# Patient Record
Sex: MALE | Race: BLACK OR AFRICAN AMERICAN | NOT HISPANIC OR LATINO | Employment: FULL TIME | URBAN - METROPOLITAN AREA
[De-identification: names, ages, dates, MRNs, and addresses within clinical notes are randomized per-mention and may not be internally consistent; named-entity substitution may affect disease eponyms.]

---

## 2018-09-22 ENCOUNTER — HOSPITAL ENCOUNTER (INPATIENT)
Facility: MEDICAL CENTER | Age: 28
LOS: 5 days | DRG: 871 | End: 2018-09-27
Attending: EMERGENCY MEDICINE | Admitting: HOSPITALIST
Payer: COMMERCIAL

## 2018-09-22 ENCOUNTER — APPOINTMENT (OUTPATIENT)
Dept: RADIOLOGY | Facility: MEDICAL CENTER | Age: 28
DRG: 871 | End: 2018-09-22
Attending: EMERGENCY MEDICINE
Payer: COMMERCIAL

## 2018-09-22 DIAGNOSIS — J18.9 PNEUMONIA OF BOTH LUNGS DUE TO INFECTIOUS ORGANISM, UNSPECIFIED PART OF LUNG: ICD-10-CM

## 2018-09-22 DIAGNOSIS — J98.2 PNEUMOMEDIASTINUM (HCC): ICD-10-CM

## 2018-09-22 DIAGNOSIS — R11.2 NON-INTRACTABLE VOMITING WITH NAUSEA, UNSPECIFIED VOMITING TYPE: ICD-10-CM

## 2018-09-22 PROBLEM — A41.9 SEPSIS (HCC): Status: ACTIVE | Noted: 2018-09-22

## 2018-09-22 PROBLEM — R00.0 TACHYCARDIA: Status: ACTIVE | Noted: 2018-09-22

## 2018-09-22 PROBLEM — R10.9 ABDOMINAL PAIN: Status: ACTIVE | Noted: 2018-09-22

## 2018-09-22 LAB
ALBUMIN SERPL BCP-MCNC: 2.8 G/DL (ref 3.2–4.9)
ALBUMIN SERPL BCP-MCNC: 3.7 G/DL (ref 3.2–4.9)
ALBUMIN/GLOB SERPL: 1 G/DL
ALBUMIN/GLOB SERPL: 1.1 G/DL
ALP SERPL-CCNC: 55 U/L (ref 30–99)
ALP SERPL-CCNC: 74 U/L (ref 30–99)
ALT SERPL-CCNC: 11 U/L (ref 2–50)
ALT SERPL-CCNC: 12 U/L (ref 2–50)
ANION GAP SERPL CALC-SCNC: 8 MMOL/L (ref 0–11.9)
ANION GAP SERPL CALC-SCNC: 9 MMOL/L (ref 0–11.9)
ANISOCYTOSIS BLD QL SMEAR: ABNORMAL
APPEARANCE UR: CLEAR
AST SERPL-CCNC: 22 U/L (ref 12–45)
AST SERPL-CCNC: 26 U/L (ref 12–45)
BACTERIA #/AREA URNS HPF: NEGATIVE /HPF
BASE EXCESS BLDA CALC-SCNC: 2 MMOL/L (ref -4–3)
BASOPHILS # BLD AUTO: 0 % (ref 0–1.8)
BASOPHILS # BLD: 0 K/UL (ref 0–0.12)
BILIRUB SERPL-MCNC: 1 MG/DL (ref 0.1–1.5)
BILIRUB SERPL-MCNC: 1.3 MG/DL (ref 0.1–1.5)
BILIRUB UR QL STRIP.AUTO: NEGATIVE
BODY TEMPERATURE: ABNORMAL CENTIGRADE
BUN SERPL-MCNC: 13 MG/DL (ref 8–22)
BUN SERPL-MCNC: 15 MG/DL (ref 8–22)
CALCIUM SERPL-MCNC: 8.1 MG/DL (ref 8.5–10.5)
CALCIUM SERPL-MCNC: 9.3 MG/DL (ref 8.5–10.5)
CHLORIDE SERPL-SCNC: 103 MMOL/L (ref 96–112)
CHLORIDE SERPL-SCNC: 96 MMOL/L (ref 96–112)
CO2 SERPL-SCNC: 25 MMOL/L (ref 20–33)
CO2 SERPL-SCNC: 28 MMOL/L (ref 20–33)
COLOR UR: YELLOW
CREAT SERPL-MCNC: 0.95 MG/DL (ref 0.5–1.4)
CREAT SERPL-MCNC: 1.06 MG/DL (ref 0.5–1.4)
DOHLE BOD BLD QL SMEAR: NORMAL
EOSINOPHIL # BLD AUTO: 0 K/UL (ref 0–0.51)
EOSINOPHIL NFR BLD: 0 % (ref 0–6.9)
EPI CELLS #/AREA URNS HPF: NEGATIVE /HPF
ERYTHROCYTE [DISTWIDTH] IN BLOOD BY AUTOMATED COUNT: 35.1 FL (ref 35.9–50)
FLUAV RNA SPEC QL NAA+PROBE: NEGATIVE
FLUBV RNA SPEC QL NAA+PROBE: NEGATIVE
GLOBULIN SER CALC-MCNC: 2.8 G/DL (ref 1.9–3.5)
GLOBULIN SER CALC-MCNC: 3.5 G/DL (ref 1.9–3.5)
GLUCOSE SERPL-MCNC: 100 MG/DL (ref 65–99)
GLUCOSE SERPL-MCNC: 131 MG/DL (ref 65–99)
GLUCOSE UR STRIP.AUTO-MCNC: NEGATIVE MG/DL
HCO3 BLDA-SCNC: 26 MMOL/L (ref 17–25)
HCT VFR BLD AUTO: 47.3 % (ref 42–52)
HGB BLD-MCNC: 17.2 G/DL (ref 14–18)
HYALINE CASTS #/AREA URNS LPF: ABNORMAL /LPF
KETONES UR STRIP.AUTO-MCNC: NEGATIVE MG/DL
LACTATE BLD-SCNC: 2.1 MMOL/L (ref 0.5–2)
LEUKOCYTE ESTERASE UR QL STRIP.AUTO: NEGATIVE
LIPASE SERPL-CCNC: 11 U/L (ref 11–82)
LYMPHOCYTES # BLD AUTO: 1.14 K/UL (ref 1–4.8)
LYMPHOCYTES NFR BLD: 12.4 % (ref 22–41)
MAGNESIUM SERPL-MCNC: 2.2 MG/DL (ref 1.5–2.5)
MANUAL DIFF BLD: NORMAL
MCH RBC QN AUTO: 30.5 PG (ref 27–33)
MCHC RBC AUTO-ENTMCNC: 36.4 G/DL (ref 33.7–35.3)
MCV RBC AUTO: 83.9 FL (ref 81.4–97.8)
MICRO URNS: ABNORMAL
MONOCYTES # BLD AUTO: 1.79 K/UL (ref 0–0.85)
MONOCYTES NFR BLD AUTO: 19.5 % (ref 0–13.4)
MORPHOLOGY BLD-IMP: NORMAL
NEUTROPHILS # BLD AUTO: 6.27 K/UL (ref 1.82–7.42)
NEUTROPHILS NFR BLD: 68.1 % (ref 44–72)
NITRITE UR QL STRIP.AUTO: NEGATIVE
NRBC # BLD AUTO: 0 K/UL
NRBC BLD-RTO: 0 /100 WBC
OVALOCYTES BLD QL SMEAR: NORMAL
PCO2 BLDA: 38.3 MMHG (ref 26–37)
PH BLDA: 7.45 [PH] (ref 7.4–7.5)
PH UR STRIP.AUTO: 6 [PH]
PLATELET # BLD AUTO: 199 K/UL (ref 164–446)
PLATELET BLD QL SMEAR: NORMAL
PMV BLD AUTO: 11.2 FL (ref 9–12.9)
PO2 BLDA: 46.6 MMHG (ref 64–87)
POTASSIUM SERPL-SCNC: 3.5 MMOL/L (ref 3.6–5.5)
POTASSIUM SERPL-SCNC: 4.1 MMOL/L (ref 3.6–5.5)
PROCALCITONIN SERPL-MCNC: 31.6 NG/ML
PROT SERPL-MCNC: 5.6 G/DL (ref 6–8.2)
PROT SERPL-MCNC: 7.2 G/DL (ref 6–8.2)
PROT UR QL STRIP: 30 MG/DL
RBC # BLD AUTO: 5.64 M/UL (ref 4.7–6.1)
RBC # URNS HPF: ABNORMAL /HPF
RBC BLD AUTO: PRESENT
RBC UR QL AUTO: ABNORMAL
SAO2 % BLDA: 82.8 % (ref 93–99)
SODIUM SERPL-SCNC: 133 MMOL/L (ref 135–145)
SODIUM SERPL-SCNC: 136 MMOL/L (ref 135–145)
SP GR UR STRIP.AUTO: 1.01
TSH SERPL DL<=0.005 MIU/L-ACNC: 0.34 UIU/ML (ref 0.38–5.33)
UROBILINOGEN UR STRIP.AUTO-MCNC: 1 MG/DL
WBC # BLD AUTO: 9.2 K/UL (ref 4.8–10.8)
WBC #/AREA URNS HPF: ABNORMAL /HPF

## 2018-09-22 PROCEDURE — 96361 HYDRATE IV INFUSION ADD-ON: CPT

## 2018-09-22 PROCEDURE — 87181 SC STD AGAR DILUTION PER AGT: CPT

## 2018-09-22 PROCEDURE — 87077 CULTURE AEROBIC IDENTIFY: CPT | Mod: 91

## 2018-09-22 PROCEDURE — 96365 THER/PROPH/DIAG IV INF INIT: CPT

## 2018-09-22 PROCEDURE — 87633 RESP VIRUS 12-25 TARGETS: CPT

## 2018-09-22 PROCEDURE — 74210 X-RAY XM PHRNX&/CRV ESOPH C+: CPT

## 2018-09-22 PROCEDURE — 700111 HCHG RX REV CODE 636 W/ 250 OVERRIDE (IP): Performed by: HOSPITALIST

## 2018-09-22 PROCEDURE — 770022 HCHG ROOM/CARE - ICU (200)

## 2018-09-22 PROCEDURE — 99291 CRITICAL CARE FIRST HOUR: CPT

## 2018-09-22 PROCEDURE — 82803 BLOOD GASES ANY COMBINATION: CPT

## 2018-09-22 PROCEDURE — 700102 HCHG RX REV CODE 250 W/ 637 OVERRIDE(OP): Performed by: HOSPITALIST

## 2018-09-22 PROCEDURE — 87502 INFLUENZA DNA AMP PROBE: CPT

## 2018-09-22 PROCEDURE — 700105 HCHG RX REV CODE 258: Performed by: EMERGENCY MEDICINE

## 2018-09-22 PROCEDURE — 700111 HCHG RX REV CODE 636 W/ 250 OVERRIDE (IP): Performed by: EMERGENCY MEDICINE

## 2018-09-22 PROCEDURE — 85007 BL SMEAR W/DIFF WBC COUNT: CPT

## 2018-09-22 PROCEDURE — 87486 CHLMYD PNEUM DNA AMP PROBE: CPT

## 2018-09-22 PROCEDURE — 700105 HCHG RX REV CODE 258: Performed by: HOSPITALIST

## 2018-09-22 PROCEDURE — 81001 URINALYSIS AUTO W/SCOPE: CPT

## 2018-09-22 PROCEDURE — 93005 ELECTROCARDIOGRAM TRACING: CPT | Performed by: HOSPITALIST

## 2018-09-22 PROCEDURE — 94640 AIRWAY INHALATION TREATMENT: CPT

## 2018-09-22 PROCEDURE — 94760 N-INVAS EAR/PLS OXIMETRY 1: CPT

## 2018-09-22 PROCEDURE — 80053 COMPREHEN METABOLIC PANEL: CPT | Mod: 91

## 2018-09-22 PROCEDURE — 85027 COMPLETE CBC AUTOMATED: CPT

## 2018-09-22 PROCEDURE — 71260 CT THORAX DX C+: CPT

## 2018-09-22 PROCEDURE — 700101 HCHG RX REV CODE 250: Performed by: INTERNAL MEDICINE

## 2018-09-22 PROCEDURE — 87040 BLOOD CULTURE FOR BACTERIA: CPT | Mod: 91

## 2018-09-22 PROCEDURE — A9270 NON-COVERED ITEM OR SERVICE: HCPCS | Performed by: HOSPITALIST

## 2018-09-22 PROCEDURE — 99233 SBSQ HOSP IP/OBS HIGH 50: CPT | Performed by: INTERNAL MEDICINE

## 2018-09-22 PROCEDURE — 700117 HCHG RX CONTRAST REV CODE 255: Performed by: EMERGENCY MEDICINE

## 2018-09-22 PROCEDURE — 83605 ASSAY OF LACTIC ACID: CPT

## 2018-09-22 PROCEDURE — 83690 ASSAY OF LIPASE: CPT

## 2018-09-22 PROCEDURE — 87581 M.PNEUMON DNA AMP PROBE: CPT

## 2018-09-22 PROCEDURE — 96376 TX/PRO/DX INJ SAME DRUG ADON: CPT

## 2018-09-22 PROCEDURE — 36415 COLL VENOUS BLD VENIPUNCTURE: CPT

## 2018-09-22 PROCEDURE — 84145 PROCALCITONIN (PCT): CPT

## 2018-09-22 PROCEDURE — 700101 HCHG RX REV CODE 250: Performed by: HOSPITALIST

## 2018-09-22 PROCEDURE — 99291 CRITICAL CARE FIRST HOUR: CPT | Performed by: INTERNAL MEDICINE

## 2018-09-22 PROCEDURE — 71045 X-RAY EXAM CHEST 1 VIEW: CPT

## 2018-09-22 PROCEDURE — 83735 ASSAY OF MAGNESIUM: CPT

## 2018-09-22 PROCEDURE — 96375 TX/PRO/DX INJ NEW DRUG ADDON: CPT

## 2018-09-22 PROCEDURE — 93010 ELECTROCARDIOGRAM REPORT: CPT | Performed by: INTERNAL MEDICINE

## 2018-09-22 PROCEDURE — 700111 HCHG RX REV CODE 636 W/ 250 OVERRIDE (IP): Performed by: INTERNAL MEDICINE

## 2018-09-22 PROCEDURE — 84443 ASSAY THYROID STIM HORMONE: CPT

## 2018-09-22 PROCEDURE — 99221 1ST HOSP IP/OBS SF/LOW 40: CPT | Performed by: HOSPITALIST

## 2018-09-22 RX ORDER — SODIUM CHLORIDE 9 MG/ML
1000 INJECTION, SOLUTION INTRAVENOUS ONCE
Status: COMPLETED | OUTPATIENT
Start: 2018-09-22 | End: 2018-09-23

## 2018-09-22 RX ORDER — FAMOTIDINE 20 MG/1
20 TABLET, FILM COATED ORAL 2 TIMES DAILY
Status: DISCONTINUED | OUTPATIENT
Start: 2018-09-22 | End: 2018-09-27 | Stop reason: HOSPADM

## 2018-09-22 RX ORDER — BISACODYL 10 MG
10 SUPPOSITORY, RECTAL RECTAL
Status: DISCONTINUED | OUTPATIENT
Start: 2018-09-22 | End: 2018-09-27 | Stop reason: HOSPADM

## 2018-09-22 RX ORDER — MORPHINE SULFATE 4 MG/ML
4 INJECTION, SOLUTION INTRAMUSCULAR; INTRAVENOUS ONCE
Status: COMPLETED | OUTPATIENT
Start: 2018-09-22 | End: 2018-09-22

## 2018-09-22 RX ORDER — SODIUM CHLORIDE 9 MG/ML
1000 INJECTION, SOLUTION INTRAVENOUS ONCE
Status: COMPLETED | OUTPATIENT
Start: 2018-09-22 | End: 2018-09-22

## 2018-09-22 RX ORDER — POLYETHYLENE GLYCOL 3350 17 G/17G
1 POWDER, FOR SOLUTION ORAL
Status: DISCONTINUED | OUTPATIENT
Start: 2018-09-22 | End: 2018-09-27 | Stop reason: HOSPADM

## 2018-09-22 RX ORDER — ONDANSETRON 2 MG/ML
4 INJECTION INTRAMUSCULAR; INTRAVENOUS ONCE
Status: COMPLETED | OUTPATIENT
Start: 2018-09-22 | End: 2018-09-22

## 2018-09-22 RX ORDER — ONDANSETRON 2 MG/ML
4 INJECTION INTRAMUSCULAR; INTRAVENOUS EVERY 4 HOURS PRN
Status: DISCONTINUED | OUTPATIENT
Start: 2018-09-22 | End: 2018-09-27 | Stop reason: HOSPADM

## 2018-09-22 RX ORDER — OXYCODONE HYDROCHLORIDE 5 MG/1
2.5 TABLET ORAL
Status: DISCONTINUED | OUTPATIENT
Start: 2018-09-22 | End: 2018-09-26

## 2018-09-22 RX ORDER — PROMETHAZINE HYDROCHLORIDE 25 MG/1
12.5-25 TABLET ORAL EVERY 4 HOURS PRN
Status: DISCONTINUED | OUTPATIENT
Start: 2018-09-22 | End: 2018-09-27 | Stop reason: HOSPADM

## 2018-09-22 RX ORDER — AZITHROMYCIN 500 MG/1
500 INJECTION, POWDER, LYOPHILIZED, FOR SOLUTION INTRAVENOUS ONCE
Status: COMPLETED | OUTPATIENT
Start: 2018-09-22 | End: 2018-09-22

## 2018-09-22 RX ORDER — SODIUM CHLORIDE AND POTASSIUM CHLORIDE 150; 900 MG/100ML; MG/100ML
INJECTION, SOLUTION INTRAVENOUS CONTINUOUS
Status: DISCONTINUED | OUTPATIENT
Start: 2018-09-22 | End: 2018-09-24

## 2018-09-22 RX ORDER — HYDROMORPHONE HYDROCHLORIDE 2 MG/ML
0.5 INJECTION, SOLUTION INTRAMUSCULAR; INTRAVENOUS; SUBCUTANEOUS ONCE
Status: COMPLETED | OUTPATIENT
Start: 2018-09-22 | End: 2018-09-22

## 2018-09-22 RX ORDER — AMOXICILLIN 250 MG
2 CAPSULE ORAL 2 TIMES DAILY
Status: DISCONTINUED | OUTPATIENT
Start: 2018-09-23 | End: 2018-09-27 | Stop reason: HOSPADM

## 2018-09-22 RX ORDER — METHYLPREDNISOLONE SODIUM SUCCINATE 125 MG/2ML
62.5 INJECTION, POWDER, LYOPHILIZED, FOR SOLUTION INTRAMUSCULAR; INTRAVENOUS EVERY 6 HOURS
Status: DISCONTINUED | OUTPATIENT
Start: 2018-09-22 | End: 2018-09-23

## 2018-09-22 RX ORDER — ONDANSETRON 4 MG/1
4 TABLET, ORALLY DISINTEGRATING ORAL EVERY 4 HOURS PRN
Status: DISCONTINUED | OUTPATIENT
Start: 2018-09-22 | End: 2018-09-27 | Stop reason: HOSPADM

## 2018-09-22 RX ORDER — IPRATROPIUM BROMIDE AND ALBUTEROL SULFATE 2.5; .5 MG/3ML; MG/3ML
3 SOLUTION RESPIRATORY (INHALATION)
Status: DISCONTINUED | OUTPATIENT
Start: 2018-09-22 | End: 2018-09-23 | Stop reason: ALTCHOICE

## 2018-09-22 RX ORDER — OXYCODONE HYDROCHLORIDE 5 MG/1
5 TABLET ORAL
Status: DISCONTINUED | OUTPATIENT
Start: 2018-09-22 | End: 2018-09-26

## 2018-09-22 RX ORDER — MORPHINE SULFATE 4 MG/ML
2 INJECTION, SOLUTION INTRAMUSCULAR; INTRAVENOUS
Status: DISCONTINUED | OUTPATIENT
Start: 2018-09-22 | End: 2018-09-26

## 2018-09-22 RX ORDER — ACETAMINOPHEN 325 MG/1
650 TABLET ORAL EVERY 6 HOURS PRN
Status: DISCONTINUED | OUTPATIENT
Start: 2018-09-22 | End: 2018-09-27 | Stop reason: HOSPADM

## 2018-09-22 RX ORDER — HEPARIN SODIUM 5000 [USP'U]/ML
5000 INJECTION, SOLUTION INTRAVENOUS; SUBCUTANEOUS EVERY 8 HOURS
Status: DISCONTINUED | OUTPATIENT
Start: 2018-09-22 | End: 2018-09-26

## 2018-09-22 RX ORDER — PROMETHAZINE HYDROCHLORIDE 25 MG/1
12.5-25 SUPPOSITORY RECTAL EVERY 4 HOURS PRN
Status: DISCONTINUED | OUTPATIENT
Start: 2018-09-22 | End: 2018-09-27 | Stop reason: HOSPADM

## 2018-09-22 RX ADMIN — AZITHROMYCIN MONOHYDRATE 500 MG: 500 INJECTION, POWDER, LYOPHILIZED, FOR SOLUTION INTRAVENOUS at 15:06

## 2018-09-22 RX ADMIN — ONDANSETRON 4 MG: 2 INJECTION INTRAMUSCULAR; INTRAVENOUS at 12:17

## 2018-09-22 RX ADMIN — IOHEXOL 75 ML: 350 INJECTION, SOLUTION INTRAVENOUS at 14:44

## 2018-09-22 RX ADMIN — IPRATROPIUM BROMIDE AND ALBUTEROL SULFATE 3 ML: .5; 3 SOLUTION RESPIRATORY (INHALATION) at 21:06

## 2018-09-22 RX ADMIN — OXYCODONE HYDROCHLORIDE 5 MG: 5 TABLET ORAL at 20:51

## 2018-09-22 RX ADMIN — IOHEXOL 75 ML: 350 INJECTION, SOLUTION INTRAVENOUS at 12:30

## 2018-09-22 RX ADMIN — MORPHINE SULFATE 4 MG: 4 INJECTION INTRAVENOUS at 09:09

## 2018-09-22 RX ADMIN — SODIUM CHLORIDE 1000 ML: 9 INJECTION, SOLUTION INTRAVENOUS at 09:08

## 2018-09-22 RX ADMIN — SODIUM CHLORIDE 1000 ML: 9 INJECTION, SOLUTION INTRAVENOUS at 10:35

## 2018-09-22 RX ADMIN — ONDANSETRON 4 MG: 2 INJECTION INTRAMUSCULAR; INTRAVENOUS at 09:08

## 2018-09-22 RX ADMIN — POTASSIUM CHLORIDE AND SODIUM CHLORIDE: 900; 150 INJECTION, SOLUTION INTRAVENOUS at 18:33

## 2018-09-22 RX ADMIN — HEPARIN SODIUM 5000 UNITS: 5000 INJECTION, SOLUTION INTRAVENOUS; SUBCUTANEOUS at 21:52

## 2018-09-22 RX ADMIN — AMPICILLIN AND SULBACTAM 3 G: 2; 1 INJECTION, POWDER, FOR SOLUTION INTRAVENOUS at 20:51

## 2018-09-22 RX ADMIN — HYDROMORPHONE HYDROCHLORIDE 0.5 MG: 2 INJECTION, SOLUTION INTRAMUSCULAR; INTRAVENOUS; SUBCUTANEOUS at 12:20

## 2018-09-22 RX ADMIN — AMPICILLIN AND SULBACTAM 3 G: 2; 1 INJECTION, POWDER, FOR SOLUTION INTRAVENOUS at 14:27

## 2018-09-22 RX ADMIN — VANCOMYCIN HYDROCHLORIDE 1500 MG: 100 INJECTION, POWDER, LYOPHILIZED, FOR SOLUTION INTRAVENOUS at 18:37

## 2018-09-22 RX ADMIN — FAMOTIDINE 20 MG: 10 INJECTION, SOLUTION INTRAVENOUS at 18:00

## 2018-09-22 ASSESSMENT — LIFESTYLE VARIABLES: EVER_SMOKED: YES

## 2018-09-22 ASSESSMENT — COPD QUESTIONNAIRES
COPD SCREENING SCORE: 2
DO YOU EVER COUGH UP ANY MUCUS OR PHLEGM?: NO/ONLY WITH OCCASIONAL COLDS OR INFECTIONS
DURING THE PAST 4 WEEKS HOW MUCH DID YOU FEEL SHORT OF BREATH: NONE/LITTLE OF THE TIME
HAVE YOU SMOKED AT LEAST 100 CIGARETTES IN YOUR ENTIRE LIFE: YES

## 2018-09-22 ASSESSMENT — PAIN SCALES - GENERAL
PAINLEVEL_OUTOF10: 3
PAINLEVEL_OUTOF10: 3
PAINLEVEL_OUTOF10: 4
PAINLEVEL_OUTOF10: 8
PAINLEVEL_OUTOF10: 8
PAINLEVEL_OUTOF10: 5
PAINLEVEL_OUTOF10: 4

## 2018-09-22 ASSESSMENT — PAIN DESCRIPTION - DESCRIPTORS: DESCRIPTORS: ACHING

## 2018-09-22 NOTE — ED TRIAGE NOTES
Pt ambulates to triage with family  Chief Complaint   Patient presents with   • N/V   • Abdominal Pain   • Body Aches   Pt asked to wait in lobby, pt updated on triage process and pt asked to inform RN of any changes.

## 2018-09-22 NOTE — ED PROVIDER NOTES
"ED Provider Note    CHIEF COMPLAINT  Chief Complaint   Patient presents with   • N/V   • Abdominal Pain   • Body Aches       HPI  Gabriel Lemos is a 27 y.o. male who presents for evaluation of vomiting and body aches.  The patient states he has been sick for the past 6 days.  He has not had any fevers.  He has generalized crampy abdominal pain and generalized body aches.  He has had no diarrhea.  He states he has been urinating about every 30 minutes.    REVIEW OF SYSTEMS  See HPI for further details. All other systems negative.    PAST MEDICAL HISTORY  History reviewed. No pertinent past medical history.    FAMILY HISTORY  History reviewed. No pertinent family history.    SOCIAL HISTORY  Social History     Social History   • Marital status:      Spouse name: N/A   • Number of children: N/A   • Years of education: N/A     Social History Main Topics   • Smoking status: Current Every Day Smoker     Packs/day: 0.50     Years: 3.00     Types: Cigarettes   • Smokeless tobacco: Never Used   • Alcohol use No   • Drug use: No   • Sexual activity: Not on file     Other Topics Concern   • Not on file     Social History Narrative   • No narrative on file       SURGICAL HISTORY  History reviewed. No pertinent surgical history.    CURRENT MEDICATIONS  Home Medications     Reviewed by Mervat Ferguson R.N. (Registered Nurse) on 09/22/18 at 0827  Med List Status: Complete   Medication Last Dose Status        Patient Vasu Taking any Medications                       ALLERGIES  No Known Allergies    PHYSICAL EXAM  VITAL SIGNS: /68   Pulse (!) 132   Temp 36.8 °C (98.2 °F) (Temporal)   Resp 14   Ht 1.651 m (5' 5\")   Wt 60 kg (132 lb 4.4 oz)   SpO2 95%   BMI 22.01 kg/m²   Constitutional: Well developed, Well nourished, ill appearing.  HENT: Normocephalic, Atraumatic, dry mucous membranes.    Eyes:  EOMI, Conjunctiva normal, No discharge.   Cardiovascular: Tachycardic, Normal rhythm, No murmurs, No rubs, No gallops. "   Thorax & Lungs: Clear to auscultation without wheezes, rales, or rhonchi.    Abdomen: Soft, nontender.  Skin: Warm, Dry.  Musculoskeletal: Good range of motion in all major joints.    Neurologic: Awake and alert.    RADIOLOGY/PROCEDURES  DX-ESOPH. FLUORO (BARIUM SWALLOW)   Final Result      Normal caliber esophagus without evidence for esophageal perforation.      DX-CHEST-PORTABLE (1 VIEW)   Final Result      1.  Pneumomediastinum and possible tiny RIGHT apical pneumothorax.   2.  Extensive LEFT mid and lower lung consolidation, likely pneumonia.   3.  Possible minimal RIGHT midlung infiltrate.   4.  This constellation of findings is concerning for esophageal perforation.      These findings were discussed with SOFIA CASEY on 9/22/2018 11:03 AM.         CT-CHEST (THORAX) WITH    (Results Pending)         COURSE & MEDICAL DECISION MAKING  Pertinent Labs & Imaging studies reviewed. (See chart for details)  This is a 27-year-old here for evaluation of vomiting.  He has had a 6 day history of intractable vomiting.  He has had no fevers.  States is also had a bit of a cough.  He is extremely tachycardic on arrival and IV is established and is hydrated with 1 L of normal saline as well as treated with morphine and Zofran.  Laboratories include urinalysis which is unremarkable.  Lactate is 2.1.  CBC shows a normal white count and differential 68 polys and 12 lymphocytes.  Chemistries are notable only for a potassium of 3.5 and a glucose of 131.  Interestingly I was concerned that his symptoms may been related to diabetes however I think that unlikely based on his laboratory workup.  Liver function studies and lipase are normal.  Upon repeat evaluation the patient states he is feeling a bit better but he still has a tachycardia therefore he is given another liter of normal saline.  At that point a chest x-ray is obtained due to his cough.  I received a phone call from the radiologist.  Patient has what appears to be  pneumomediastinum and extensive left mid and lower lung consolidation likely pneumonia.  He also may have some right midlung infiltrate.  The study is concerning for esophageal perforation.  In speaking with the radiologist and esophageal contrasted swallowing study is obtained.  This shows no evidence of esophageal perforation.  At this time blood cultures have been obtained.  I discussed the case with our pharmacist and the patient will be started on community-acquired pneumonia antibiotics.  I have discussed the case with Dr. Desiree Frost of the hospitalist service.  I have consulted GI and they are coming down to evaluate the patient.    FINAL IMPRESSION  1.  Intractable nausea and vomiting  2.  Community-acquired pneumonia  3.  Pneumomediastinum  4.  Patient required 35 minutes of critical care time         Electronically signed by: Ceasar Bellamy, 9/22/2018 9:02 AM

## 2018-09-23 ENCOUNTER — APPOINTMENT (OUTPATIENT)
Dept: RADIOLOGY | Facility: MEDICAL CENTER | Age: 28
DRG: 871 | End: 2018-09-23
Attending: INTERNAL MEDICINE
Payer: COMMERCIAL

## 2018-09-23 PROBLEM — R10.9 ABDOMINAL PAIN: Status: RESOLVED | Noted: 2018-09-22 | Resolved: 2018-09-23

## 2018-09-23 PROBLEM — J96.01 ACUTE RESPIRATORY FAILURE WITH HYPOXIA (HCC): Status: ACTIVE | Noted: 2018-09-23

## 2018-09-23 PROBLEM — K22.3 ESOPHAGEAL RUPTURE: Status: ACTIVE | Noted: 2018-09-23

## 2018-09-23 PROBLEM — Z72.0 TOBACCO ABUSE: Status: ACTIVE | Noted: 2018-09-23

## 2018-09-23 LAB
ALBUMIN SERPL BCP-MCNC: 2.9 G/DL (ref 3.2–4.9)
ALBUMIN/GLOB SERPL: 1 G/DL
ALP SERPL-CCNC: 56 U/L (ref 30–99)
ALT SERPL-CCNC: 10 U/L (ref 2–50)
ANION GAP SERPL CALC-SCNC: 8 MMOL/L (ref 0–11.9)
ANISOCYTOSIS BLD QL SMEAR: ABNORMAL
AST SERPL-CCNC: 20 U/L (ref 12–45)
BASOPHILS # BLD AUTO: 0 % (ref 0–1.8)
BASOPHILS # BLD: 0 K/UL (ref 0–0.12)
BILIRUB SERPL-MCNC: 1 MG/DL (ref 0.1–1.5)
BUN SERPL-MCNC: 13 MG/DL (ref 8–22)
C PNEUM DNA SPEC QL NAA+PROBE: NOT DETECTED
CALCIUM SERPL-MCNC: 8.6 MG/DL (ref 8.5–10.5)
CHLORIDE SERPL-SCNC: 106 MMOL/L (ref 96–112)
CO2 SERPL-SCNC: 25 MMOL/L (ref 20–33)
CREAT SERPL-MCNC: 0.9 MG/DL (ref 0.5–1.4)
EKG IMPRESSION: NORMAL
EOSINOPHIL # BLD AUTO: 0 K/UL (ref 0–0.51)
EOSINOPHIL NFR BLD: 0 % (ref 0–6.9)
ERYTHROCYTE [DISTWIDTH] IN BLOOD BY AUTOMATED COUNT: 36.4 FL (ref 35.9–50)
FLUAV H1 2009 PAND RNA SPEC QL NAA+PROBE: NOT DETECTED
FLUAV H1 RNA SPEC QL NAA+PROBE: NOT DETECTED
FLUAV H3 RNA SPEC QL NAA+PROBE: NOT DETECTED
FLUAV RNA SPEC QL NAA+PROBE: NOT DETECTED
FLUBV RNA SPEC QL NAA+PROBE: NOT DETECTED
GLOBULIN SER CALC-MCNC: 2.8 G/DL (ref 1.9–3.5)
GLUCOSE SERPL-MCNC: 108 MG/DL (ref 65–99)
GRAM STN SPEC: NORMAL
HADV DNA SPEC QL NAA+PROBE: NOT DETECTED
HCOV RNA SPEC QL NAA+PROBE: NOT DETECTED
HCT VFR BLD AUTO: 37.6 % (ref 42–52)
HGB BLD-MCNC: 13.9 G/DL (ref 14–18)
HMPV RNA SPEC QL NAA+PROBE: NOT DETECTED
HPIV1 RNA SPEC QL NAA+PROBE: NOT DETECTED
HPIV2 RNA SPEC QL NAA+PROBE: NOT DETECTED
HPIV3 RNA SPEC QL NAA+PROBE: NOT DETECTED
HPIV4 RNA SPEC QL NAA+PROBE: NOT DETECTED
LYMPHOCYTES # BLD AUTO: 1.53 K/UL (ref 1–4.8)
LYMPHOCYTES NFR BLD: 14.6 % (ref 22–41)
M PNEUMO DNA SPEC QL NAA+PROBE: NOT DETECTED
MACROCYTES BLD QL SMEAR: ABNORMAL
MAGNESIUM SERPL-MCNC: 2.6 MG/DL (ref 1.5–2.5)
MANUAL DIFF BLD: NORMAL
MCH RBC QN AUTO: 31 PG (ref 27–33)
MCHC RBC AUTO-ENTMCNC: 37 G/DL (ref 33.7–35.3)
MCV RBC AUTO: 83.7 FL (ref 81.4–97.8)
MONOCYTES # BLD AUTO: 0.45 K/UL (ref 0–0.85)
MONOCYTES NFR BLD AUTO: 4.3 % (ref 0–13.4)
MORPHOLOGY BLD-IMP: NORMAL
MRSA DNA SPEC QL NAA+PROBE: NORMAL
NEUTROPHILS # BLD AUTO: 8.42 K/UL (ref 1.82–7.42)
NEUTROPHILS NFR BLD: 75.9 % (ref 44–72)
NEUTS BAND NFR BLD MANUAL: 4.3 % (ref 0–10)
NRBC # BLD AUTO: 0 K/UL
NRBC BLD-RTO: 0 /100 WBC
PHOSPHATE SERPL-MCNC: 2.4 MG/DL (ref 2.5–4.5)
PLATELET # BLD AUTO: 174 K/UL (ref 164–446)
PLATELET BLD QL SMEAR: NORMAL
PMV BLD AUTO: 10.9 FL (ref 9–12.9)
POIKILOCYTOSIS BLD QL SMEAR: NORMAL
POTASSIUM SERPL-SCNC: 3.9 MMOL/L (ref 3.6–5.5)
PROMYELOCYTES NFR BLD MANUAL: 0.9 %
PROT SERPL-MCNC: 5.7 G/DL (ref 6–8.2)
RBC # BLD AUTO: 4.49 M/UL (ref 4.7–6.1)
RBC BLD AUTO: PRESENT
RSV A RNA SPEC QL NAA+PROBE: NOT DETECTED
RSV B RNA SPEC QL NAA+PROBE: NOT DETECTED
RV+EV RNA SPEC QL NAA+PROBE: NOT DETECTED
SIGNIFICANT IND 70042: NORMAL
SIGNIFICANT IND 70042: NORMAL
SITE SITE: NORMAL
SITE SITE: NORMAL
SODIUM SERPL-SCNC: 139 MMOL/L (ref 135–145)
SOURCE SOURCE: NORMAL
SOURCE SOURCE: NORMAL
T3FREE SERPL-MCNC: 2.52 PG/ML (ref 2.4–4.2)
T4 FREE SERPL-MCNC: 1.4 NG/DL (ref 0.53–1.43)
WBC # BLD AUTO: 10.5 K/UL (ref 4.8–10.8)

## 2018-09-23 PROCEDURE — 84439 ASSAY OF FREE THYROXINE: CPT

## 2018-09-23 PROCEDURE — 99233 SBSQ HOSP IP/OBS HIGH 50: CPT | Performed by: INTERNAL MEDICINE

## 2018-09-23 PROCEDURE — 93010 ELECTROCARDIOGRAM REPORT: CPT | Performed by: INTERNAL MEDICINE

## 2018-09-23 PROCEDURE — 700105 HCHG RX REV CODE 258: Performed by: HOSPITALIST

## 2018-09-23 PROCEDURE — A9270 NON-COVERED ITEM OR SERVICE: HCPCS | Performed by: INTERNAL MEDICINE

## 2018-09-23 PROCEDURE — 700101 HCHG RX REV CODE 250: Performed by: INTERNAL MEDICINE

## 2018-09-23 PROCEDURE — 700105 HCHG RX REV CODE 258

## 2018-09-23 PROCEDURE — 85007 BL SMEAR W/DIFF WBC COUNT: CPT

## 2018-09-23 PROCEDURE — 84481 FREE ASSAY (FT-3): CPT

## 2018-09-23 PROCEDURE — 87641 MR-STAPH DNA AMP PROBE: CPT

## 2018-09-23 PROCEDURE — 85027 COMPLETE CBC AUTOMATED: CPT

## 2018-09-23 PROCEDURE — A9270 NON-COVERED ITEM OR SERVICE: HCPCS | Performed by: HOSPITALIST

## 2018-09-23 PROCEDURE — 87040 BLOOD CULTURE FOR BACTERIA: CPT | Mod: 91

## 2018-09-23 PROCEDURE — 700111 HCHG RX REV CODE 636 W/ 250 OVERRIDE (IP): Performed by: INTERNAL MEDICINE

## 2018-09-23 PROCEDURE — 700102 HCHG RX REV CODE 250 W/ 637 OVERRIDE(OP): Performed by: HOSPITALIST

## 2018-09-23 PROCEDURE — 700111 HCHG RX REV CODE 636 W/ 250 OVERRIDE (IP): Performed by: HOSPITALIST

## 2018-09-23 PROCEDURE — 99233 SBSQ HOSP IP/OBS HIGH 50: CPT | Performed by: HOSPITALIST

## 2018-09-23 PROCEDURE — 93005 ELECTROCARDIOGRAM TRACING: CPT | Performed by: INTERNAL MEDICINE

## 2018-09-23 PROCEDURE — 84100 ASSAY OF PHOSPHORUS: CPT

## 2018-09-23 PROCEDURE — 770022 HCHG ROOM/CARE - ICU (200)

## 2018-09-23 PROCEDURE — 71045 X-RAY EXAM CHEST 1 VIEW: CPT

## 2018-09-23 PROCEDURE — 83735 ASSAY OF MAGNESIUM: CPT

## 2018-09-23 PROCEDURE — 87389 HIV-1 AG W/HIV-1&-2 AB AG IA: CPT

## 2018-09-23 PROCEDURE — 700102 HCHG RX REV CODE 250 W/ 637 OVERRIDE(OP): Performed by: INTERNAL MEDICINE

## 2018-09-23 PROCEDURE — 80053 COMPREHEN METABOLIC PANEL: CPT

## 2018-09-23 RX ORDER — SODIUM CHLORIDE 9 MG/ML
INJECTION, SOLUTION INTRAVENOUS
Status: COMPLETED
Start: 2018-09-23 | End: 2018-09-23

## 2018-09-23 RX ADMIN — NICOTINE 7 MG: 7 PATCH, EXTENDED RELEASE TRANSDERMAL at 04:34

## 2018-09-23 RX ADMIN — AMPICILLIN AND SULBACTAM 3 G: 2; 1 INJECTION, POWDER, FOR SOLUTION INTRAVENOUS at 11:30

## 2018-09-23 RX ADMIN — METHYLPREDNISOLONE SODIUM SUCCINATE 62.5 MG: 125 INJECTION, POWDER, FOR SOLUTION INTRAMUSCULAR; INTRAVENOUS at 04:34

## 2018-09-23 RX ADMIN — HEPARIN SODIUM 5000 UNITS: 5000 INJECTION, SOLUTION INTRAVENOUS; SUBCUTANEOUS at 21:54

## 2018-09-23 RX ADMIN — SODIUM CHLORIDE 500 ML: 9 INJECTION, SOLUTION INTRAVENOUS at 11:34

## 2018-09-23 RX ADMIN — VANCOMYCIN HYDROCHLORIDE 1000 MG: 100 INJECTION, POWDER, LYOPHILIZED, FOR SOLUTION INTRAVENOUS at 02:28

## 2018-09-23 RX ADMIN — FAMOTIDINE 20 MG: 10 INJECTION, SOLUTION INTRAVENOUS at 04:34

## 2018-09-23 RX ADMIN — HEPARIN SODIUM 5000 UNITS: 5000 INJECTION, SOLUTION INTRAVENOUS; SUBCUTANEOUS at 04:34

## 2018-09-23 RX ADMIN — METHYLPREDNISOLONE SODIUM SUCCINATE 62.5 MG: 125 INJECTION, POWDER, FOR SOLUTION INTRAMUSCULAR; INTRAVENOUS at 00:04

## 2018-09-23 RX ADMIN — AMPICILLIN AND SULBACTAM 3 G: 2; 1 INJECTION, POWDER, FOR SOLUTION INTRAVENOUS at 17:20

## 2018-09-23 RX ADMIN — ACETAMINOPHEN 650 MG: 325 TABLET, FILM COATED ORAL at 22:00

## 2018-09-23 RX ADMIN — POTASSIUM CHLORIDE AND SODIUM CHLORIDE: 900; 150 INJECTION, SOLUTION INTRAVENOUS at 17:13

## 2018-09-23 RX ADMIN — FAMOTIDINE 20 MG: 20 TABLET ORAL at 17:20

## 2018-09-23 RX ADMIN — AMPICILLIN AND SULBACTAM 3 G: 2; 1 INJECTION, POWDER, FOR SOLUTION INTRAVENOUS at 00:04

## 2018-09-23 RX ADMIN — VANCOMYCIN HYDROCHLORIDE 1000 MG: 100 INJECTION, POWDER, LYOPHILIZED, FOR SOLUTION INTRAVENOUS at 11:29

## 2018-09-23 RX ADMIN — MORPHINE SULFATE 2 MG: 4 INJECTION INTRAVENOUS at 10:08

## 2018-09-23 RX ADMIN — AMPICILLIN AND SULBACTAM 3 G: 2; 1 INJECTION, POWDER, FOR SOLUTION INTRAVENOUS at 04:33

## 2018-09-23 RX ADMIN — OXYCODONE HYDROCHLORIDE 2.5 MG: 5 TABLET ORAL at 19:51

## 2018-09-23 RX ADMIN — HEPARIN SODIUM 5000 UNITS: 5000 INJECTION, SOLUTION INTRAVENOUS; SUBCUTANEOUS at 14:56

## 2018-09-23 ASSESSMENT — ENCOUNTER SYMPTOMS
WEAKNESS: 0
PALPITATIONS: 0
SEIZURES: 0
HALLUCINATIONS: 0
FATIGUE: 0
SPUTUM PRODUCTION: 1
LOSS OF CONSCIOUSNESS: 0
HEMOPTYSIS: 0
DIZZINESS: 0
EYE PAIN: 0
PSYCHIATRIC NEGATIVE: 1
FEVER: 0
FOCAL WEAKNESS: 0
HEADACHES: 0
NERVOUS/ANXIOUS: 0
RESPIRATORY NEGATIVE: 1
BACK PAIN: 0
NAUSEA: 0
GASTROINTESTINAL NEGATIVE: 1
CARDIOVASCULAR NEGATIVE: 1
FLANK PAIN: 0
BLURRED VISION: 0
ORTHOPNEA: 0
EYE DISCHARGE: 0
BLOOD IN STOOL: 0
NECK PAIN: 0
ABDOMINAL PAIN: 0
EYE REDNESS: 0
VOMITING: 0
MEMORY LOSS: 0
STRIDOR: 0
SORE THROAT: 0
CHILLS: 0
DIARRHEA: 0
COUGH: 1
SPEECH CHANGE: 0
SHORTNESS OF BREATH: 1
FALLS: 0

## 2018-09-23 ASSESSMENT — PAIN SCALES - GENERAL
PAINLEVEL_OUTOF10: 3
PAINLEVEL_OUTOF10: 3
PAINLEVEL_OUTOF10: 10
PAINLEVEL_OUTOF10: 3
PAINLEVEL_OUTOF10: 2
PAINLEVEL_OUTOF10: 6
PAINLEVEL_OUTOF10: 2
PAINLEVEL_OUTOF10: 0
PAINLEVEL_OUTOF10: 5
PAINLEVEL_OUTOF10: 3
PAINLEVEL_OUTOF10: 2
PAINLEVEL_OUTOF10: 1

## 2018-09-23 ASSESSMENT — LIFESTYLE VARIABLES: SUBSTANCE_ABUSE: 0

## 2018-09-23 NOTE — CONSULTS
DATE OF SERVICE:  09/22/2018    GASTROENTEROLOGY CONSULTATION    REFERRING PHYSICIANS:  Dr. Bellamy and Dr. Harden.    REASON FOR CONSULTATION:  Pneumomediastinum with concern for esophageal tear.    HISTORY OF PRESENT ILLNESS:  This is a 27-year-old gentleman who presents to   the hospital with neck discomfort and hematemesis.  He states that on   Thursday/Friday, he started having a nagging cough that progressed in its   intensity.  The cough is associated with vomiting and the vomiting became   vigorous.  Eventually, the vomiting resulted in some mild blood-streaked   hematemesis and then he started having neck discomfort.  He presented to the   hospital where it was noted his white count was normal at 9.2, but imaging CT   scan of the chest revealed a pneumomediastinum with extensive multifocal   pneumonia, particularly involving left upper lobes, multifocal, ill-defined   parenchymal opacities in the right lung.  No pneumothorax is appreciated.  No   extravasation of oral contrast in the mediastinum was appreciated.    Subsequently, an esophagram was performed.  This was done with barium and no   evidence for esophageal perforation was appreciated.  The patient reports some   mild chest discomfort with breathing, but is able to maintain his airway and   he is able to maintain his oxygenation with supplemental oxygenation.  He has   been administered IV antibiotics and IV fluids and is currently receiving sips   of clears.    PAST SURGICAL HISTORY:  None.    FAMILY HISTORY:  Grandfather with stroke needing PEG tube placement.    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  He is currently   engaged.  His fiancee is at bedside.  Patient reports 3-year history of   tobacco, half pack per day.  Positive marijuana use.  No alcohol use.  No   illicit drug use.    PAST SURGICAL HISTORY:  None.    OUTPATIENT MEDICATIONS:  None.    ALLERGIES:  No drug allergies.    REVIEW OF SYSTEMS:  CONSTITUTIONAL:   Positive nausea, no vomiting, no fevers, no chills, and no   weight loss.  CARDIOVASCULAR:  No palpitations.  Positive chest pain, positive pain upon   deep inspiration.  PULMONARY:  Shortness of breath.  No dyspnea on exertion.  Positive cough.  GASTROINTESTINAL:  Reported hematemesis, but none currently.  No melena, no   hematochezia, and no abdominal pain.  NEUROLOGIC:  No focal weakness or numbness.  PSYCHIATRIC:  No suicidal or homicidal ideation.  MUSCULOSKELETAL:  No muscle atrophy.  EYES:  No change in vision.  EARS:  No change in hearing.  NOSE AND THROAT:  No nasal discharge.  Throat, positive discomfort in the   throat.  Positive cough.    PHYSICAL EXAMINATION:  VITAL SIGNS:  Reveals tachycardia to 114, blood pressure 108/66, satting 99%   on 2 liters supplemental oxygen, and respiratory rate of 18.  GENERAL:  Patient is able to speak in full sentences, alert and oriented x4.  HEENT:  PERRLA, EOMI.  Sclerae anicteric.  Oropharynx clear.  NECK:  No crepitus.  HEART:  Tachycardic, but regular.  LUNGS:  Bilateral crackles anteriorly.  ABDOMEN:  Soft, nontender, and nondistended.  Normal bowel sounds.  EXTREMITIES:  No edema.    LABORATORY DATA:  White blood cell count is 9.2, H and H of 17.2 and 47.3, and   platelet count 199.  Sodium 133, potassium 3.5, chloride 96, BUN and   creatinine of 15 and 1.06, AST/ALT of 22/12, alkaline phosphatase 74, and   bilirubin of 1.3.  Lactic acid 2.1.    IMAGING:  CT scan reveals pneumomediastinum, no evidence for esophageal   perforation.  There is extensive multifocal pneumonia in the left upper and   lower lobes as well as in the right lung.  A barium esophagram was performed   as well today revealing no evidence for esophageal perforation with a normal   caliber esophagus.    IMPRESSION:  1.  A 27-year-old gentleman with cough and subsequent vomiting who had mild   hematemesis, blood streaked likely secondary to Sandra-Oakes tear.  He   probably did have a very small  Boerhaave that has now sealed itself and is not   currently active.  At this time, I would have him on strict n.p.o. to   decrease risk for reflux and acid exposure to the esophagus.  I would resume   IV fluids at 125 to 150 mL per hour, considering lactic acidosis.  Considering   the pneumomediastinum, he likely had an exposure with the esophageal tear and   concern for infection is raised.  Continue IV antibiotics.  If his symptoms   worsen and the esophageal tear realizes itself, then endoscopic evaluation   would be indicated at that time.  At this time, we will reserve endoscopic   evaluation.  He does have progression and develops more significant Boerhaave   and EGD for stent placement may be considered at that time.  Risks and   benefits of procedure reviewed thoroughly with the patient, risks including   but not limited to bleeding, perforation, side effects of medications were   informed.  Patient voiced understanding and agreed to proceed.  I spoke   extensively with the patient's wife as well informed them of the findings and   recommendations and the GI plan of care.  2.  Cough, workup with pulmonary with bronchoscopy, current IV antibiotics.  3.  Nutrition, n.p.o.  If 3-4 days goes by without nutritional   supplementation, considered PICC line placement and TPN, but hopefully within   the next 24-48 hours, we can start clear liquids and advance diet as   tolerated, pending patient's progression over time.  4.  The above findings reviewed thoroughly with Dr. Harden who agrees so   extensively with this plan.       ____________________________________     MD SONIA Sacnhez / BULL    DD:  09/22/2018 16:29:10  DT:  09/22/2018 17:21:09    D#:  [id number]  Job#:  [id number]

## 2018-09-23 NOTE — ED NOTES
Navid from Lab called with critical result of P02 at 1727. Critical lab result read back to Navid.   Dr. Islas notified of critical lab result at, awaiting callback.  Critical lab result read back by Dr. Islas.

## 2018-09-23 NOTE — ASSESSMENT & PLAN NOTE
Likely microperforation secondary to nausea and vomiting with pneumomediastinum    Continue Pepcid  Advance diet if okay with GI

## 2018-09-23 NOTE — PROGRESS NOTES
Gastroenterology Progress Note     Author: Rao Newsomeciara   Date & Time Created: 9/23/2018 1:55 PM    Chief Complaint:  Cough and vomiting    Interval History:  No pain with swallowing   Very thirsty.   Improvement in cough.     Review of Systems:  Review of Systems   HENT: Negative.    Respiratory: Negative.    Cardiovascular: Negative.    Gastrointestinal: Negative.    Psychiatric/Behavioral: Negative.        Physical Exam:  Physical Exam   Constitutional: He is oriented to person, place, and time. He appears well-developed.   Cardiovascular: Normal rate.    Abdominal: Soft. Bowel sounds are normal.   Neurological: He is alert and oriented to person, place, and time.   Psychiatric: He has a normal mood and affect. His behavior is normal. Judgment and thought content normal.       Labs:  Recent Labs      09/22/18   1658   QCYLE30Y  7.45   [id number]  38.3*   [id number]  46.6*   [id number]  82.8*   ARTHCO3  26*   ARTBE  2         Recent Labs      09/22/18   0853  09/22/18   1710  09/22/18 2009 09/23/18   0306   SODIUM  133*   --   136  139   POTASSIUM  3.5*   --   4.1  3.9   CHLORIDE  96   --   103  106   CO2  28   --   25  25   BUN  15   --   13  13   CREATININE  1.06   --   0.95  0.90   MAGNESIUM   --   2.2   --    --    CALCIUM  9.3   --   8.1*  8.6     Recent Labs      09/22/18   0853  09/22/18 2009 09/23/18   0306   ALTSGPT  12  11  10   ASTSGOT  22 26  20   ALKPHOSPHAT  74  55  56   TBILIRUBIN  1.3  1.0  1.0   LIPASE  11   --    --    GLUCOSE  131*  100*  108*     Recent Labs      09/22/18   0853  09/23/18   0306   RBC  5.64  4.49*   HEMOGLOBIN  17.2  13.9*   HEMATOCRIT  47.3  37.6*   PLATELETCT  199  174     Recent Labs      09/22/18   0853  09/22/18 2009 09/23/18   0306   WBC  9.2   --   10.5   NEUTSPOLYS  68.10   --   75.90*   LYMPHOCYTES  12.40*   --   14.60*   MONOCYTES  19.50*   --   4.30   EOSINOPHILS  0.00   --   0.00   BASOPHILS  0.00   --   0.00   ASTSGOT  22  26  20   ALTSGPT  12  11  10    ALKPHOSPHAT  74  55  56   TBILIRUBIN  1.3  1.0  1.0     Hemodynamics:  Temp (24hrs), Av.8 °C (98.3 °F), Min:36.3 °C (97.3 °F), Max:37.8 °C (100.1 °F)  Temperature: 36.8 °C (98.2 °F)  Pulse  Av.4  Min: 81  Max: 143Heart Rate (Monitored): 87  NIBP: (!) 97/67     Respiratory:    Respiration: (!) 36, Pulse Oximetry: 100 %, O2 Daily Delivery Respiratory : OxyMask     Given By:: Mouthpiece, Work Of Breathing / Effort: Mild  RUL Breath Sounds: Clear, RML Breath Sounds: Diminished, RLL Breath Sounds: Diminished, PIETER Breath Sounds: Clear, LLL Breath Sounds: Diminished  Fluids:    Intake/Output Summary (Last 24 hours) at 18 1355  Last data filed at 18 1200   Gross per 24 hour   Intake          5128.65 ml   Output             1400 ml   Net          3728.65 ml     Weight: 58.5 kg (128 lb 15.5 oz)  GI/Nutrition:  Orders Placed This Encounter   Procedures   • Diet NPO     Standing Status:   Standing     Number of Occurrences:   1     Order Specific Question:   Type:     Answer:   Now [1]     Order Specific Question:   Restrict to:     Answer:   Strict [1]     Medical Decision Making, by Problem:  Active Hospital Problems    Diagnosis   • Esophageal rupture [K22.3]   • Tobacco abuse [Z72.0]   • Pneumonia [J18.9]   • Pneumomediastinum (HCC) [J98.2]   • N&V (nausea and vomiting) [R11.2]   • Abdominal pain [R10.9]   • Sepsis (HCC) [A41.9]   • Tachycardia [R00.0]       Plan:  Vomiting: Now resolved. Possible reji dumont tear. No evidence of boerhaaves on esophagram. CXR today stable. Patient thirsty. Will start clears. Will consider advancing tomorrow. No indication for EGD at this time. Discussed with patient and fiance.     Quality-Core Measures

## 2018-09-23 NOTE — CONSULTS
DATE OF SERVICE:  09/22/2018    PULMONARY CONSULTATION    CONSULT REQUESTED BY:  ER physician.    REASON FOR CONSULTATION:  Pneumonia with pneumomediastinum.    CHIEF COMPLAINT:  Productive cough, chest pain, fever, nausea, vomiting,   abdominal pain for 1 week duration.    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 and performing office jobs.    He was brought in by his family today to the ER with symptoms of fever, chills,   profuse sweating, headaches, pleuritic chest pain, left; productive cough, expectoration  shani in color mucoid in consistency with recurrent vomiting, and upper abdominal pain   for 1 week duration.    Patient is a chronic smoker with 1 pack lasting 4 days for the last 9 years   and he uses cannabis occasionally, does not inject intravenous drugs or abuse alcohol   intake.  He denies contact with a person suffering from contagious disease in   the last couple of months; however, 2 weeks ago, his son had symptoms of   common cold and he was taking care of his son.  No history of contact with   persons suffering from tuberculosis.  No past history of pneumonia.  No   hematemesis or melena.  He has been having frequency of urine.  No history of   recent travel to [place]. No history of contact with cattle or birds.    REVIEW OF SYSTEMS:  The patient complains of weakness, lethargy and myalgia.    He does not have nasal congestion or postnasal dripping.  No sore throat.  Has   nausea, vomiting and abdominal pain.  No hematemesis or melena.  Has   productive cough, expectoration is shani in color with no obvious hemoptysis.    Has pleuritic chest pain.  No dysphagia.  Complains of tachycardia and   palpitation and dizziness.  The fiancee told that the patient had been   confused for the last few days.  No  orthopnea or dependent edema.  No joint   swelling.  No skin rash.  Has frequency of urine, but no dysuria, no   hematuria, no seizures or loss of consciousness.  All other systems are   otherwise unremarkable.    FAMILY HISTORY:  No family history of tuberculosis, asthma, COPD or lung   cancer.    PERSONAL AND SOCIAL HISTORY:  The patient is unmarried, has one child 5 years   old.  He is a chronic smoker with 30-pack-year history of smoking.  He smokes   5 cigarettes per day for the last 9 years.  Denies alcohol abuse or abuse of   intravenous drugs.  Uses cannabis as a smoke occasionally.    PAST MEDICAL HISTORY: No h/o HIV, no significant medical history    PAST SURGICAL HISTORY:  No past surgical history.    MEDICATIONS OUTSIDE HOSPITAL:  None.    MEDICATIONS GIVEN IN HOSPITAL:  Unasyn, vancomycin, Zithromax, ondansetron,   famotidine, acetaminophen, oxygen.    ALLERGIES:  No known drug allergies.    CODE STATUS:  Full code.    PHYSICAL EXAMINATION:  GENERAL:  Young gentleman lying in bed in a semirecumbent position appears   Sick and is tachypneic with tachycardia  VITAL SIGNS:  Temperature is 99, pulse 121 per minute, blood pressure 95/61,   respiratory rate 30, SpO2 94% on 3 liter of oxygen via nasal cannula.  On room   air, his oxygen saturation dropped to 88 %, height 1.65 meters, 5 feet 5   inches and weight 60 kg/132 pounds.  HEAD, EYES, EARS, NOSE, THROAT:  Pupils are equal, round, reactive to light   and accommodation.  Extraocular movements are intact.  Sclerae are anicteric.    Nasal septum is in midline.  No nasal polyps.  No ear discharge.  Oral mucosa   is dry.  Tonsils are not enlarged.  NECK:  Supple.  No palpable subcutaneous crepitus.  Jugular veins not   distended.  No carotid bruit.  CHEST:  Percussion is dull on the left side.  There is bronchial breath sounds   in the left upper and lower chest.  No wheeze. Crackles audible right hemithorax.  ABDOMEN:  Soft, nontender. Liver spleen not  palpable. Bowel sounds are present.  EXTREMITIES:  No dependent edema, no clubbing.  SKIN:  Warm and dry.  MUSCULOSKELETAL:  No joint inflammation.  NEUROLOGIC:  Patient is awake and responsive, but appears anxious and the   fiancee says that she has noted him to be confused at home.  Tendon jerks are   intact.    DATA REVIEW:  Sodium is 133, potassium 3.5, chloride 96, CO2 28, BUN 15,   creatinine 1.06, calcium 9.3, and glucose 131.  Anion gap is 9.  AST 22, ALT   12, alkaline phosphatase 74, bilirubin is 1.3, albumin 3.7, total protein 7.2,   lipase 11.  Lactic acid is 2.1.  WBC count is 9.2, hemoglobin 17.2,   hematocrit 47.3, and platelet count 199.  Urinalysis shows clear urine with   specific gravity of 1.010 and pH of 6.  Leukocyte esterase, ketones and   nitrites are negative.  WBC count is 0-2 per high power field, bacteria   negative, hyaline casts 0-2.    Chest x-ray shows consolidation in the left mid and lower lung and minimal   opacity in the right mid lung with lucency adjacent to the mediastinal   structures consistent with pneumomediastinum and subcutaneous gas in the right   subclavicular region.  CT of the chest has been reviewed personally.  It   shows an extensive consolidation of the left upper lobe and the left lower   lobe with air bronchograms with consolidation, ill-defined opacities in the   right upper and lower lobes and middle lobe.  Pneumomediastinum is present,   which is tracking into the lower neck and retroclavicular soft tissues,   primarily on the right side and surrounding the heart.  Blood cultures   are in progress.  Barium swallow fluoroscopy has excluded a possibility of esophageal perforation.    ASSESSMENT:    Sepsis  Acute respiratory failure with Hypoxia  Community-acquired multifocal pneumonia involving the left upper and   lower lobes and portions of the right upper, middle, and lower lobes, has developed a pneumomediastinum/pneumopericardium.     Acute renal  insufficiency likely secondary to sepsis and dehydration       RECOMMENDATIONS:  I agree to continue the intravenous antibiotics, oxygen   supplementation, and intravenous fluids.  Considering the involvement of   Multiple lung lobes, this patient is at a very high risk of developing   Complications because of pneumonia and sepsis and developing respiratory distress.    He has extensive pneumomediastinum, which needs to be followed and a   bronchoscopy is indicated for bronchoalveolar lavage and to evaluate for   possible large airway perforation, which is unlikely.   Perform an arterial blood gas and send sputum for Gram stain and cultures.  Send viral cultures  Considering tachypnea, tachycardia, hypoxia and pneumomediastinum I recommend   that the patient should be transferred to the ICU for critical care/management instead   of medical floor.    Patient's condition has been explained to the patient's family also and the case has   been discussed with the ER physician.         ____________________________________     Muhammad K. Gondal, MD [facility] / BULL    DD:  09/22/2018 16:17:14  DT:  09/22/2018 17:32:30    D#:  [id number]  Job#:  [id number]

## 2018-09-23 NOTE — PROGRESS NOTES
Monitor Summary: .18/.10/.36. Rhythm: SR-ST. Rate: mid 70's-120's.     12 hour chart check.     2 RN skin check.

## 2018-09-23 NOTE — PROGRESS NOTES
Critical Care Progress Note    Date of admission  9/22/2018    Chief Complaint  27 y.o. male admitted 9/22/2018 with pneumomediastinum multifocal pneumonia and esophageal rupture    Hospital Course  Gabriel Lemos is a 27 y.o. male with no significant past medical history presented to the hospital for evaluation of worsening shortness of breath accompanied by chills, fevers, malaise, blood-tinged productive cough, nausea and vomiting, CT chest revealed bilateral pulmonary infiltrative process with pneumomediastinum.  He is admitted to ICU for close monitoring of respiratory status in lieu of severe community-acquired pneumonia, therefore ICU consult. Taken from Za note.       Interval Problem Update  Reviewed last 24 hour events:  No overnight events  AO4  Sinus tach  afibrile  NPO  Urine 850  No arriaga  Up with assist  Oxy mask 10l  IS 500ml  No pep  Dounebs  Gram post cocci blood  vte prophy  pepcid  vanco unasyn    Review of Systems  Review of Systems   Constitutional: Negative for chills, fatigue and fever.   HENT: Negative for sore throat.    Respiratory: Positive for cough and shortness of breath.    Cardiovascular: Positive for chest pain.   Gastrointestinal: Negative for abdominal pain, diarrhea, nausea and vomiting.   Skin: Negative for rash.   Neurological: Negative for dizziness.        Vital Signs for last 24 hours   Temp:  [36.3 °C (97.3 °F)-37.8 °C (100.1 °F)] 36.4 °C (97.5 °F)  Pulse:  [] 91  Resp:  [12-56] 29  BP: ()/(67-68) 98/67    Hemodynamic parameters for last 24 hours       Vent Settings for last 24 hours       Physical Exam   Physical Exam   Constitutional: He is oriented to person, place, and time. He appears well-developed and well-nourished. He appears distressed.   HENT:   Head: Normocephalic and atraumatic.   Eyes: Pupils are equal, round, and reactive to light. Conjunctivae are normal.   Neck: No JVD present. No tracheal deviation present. No thyromegaly present.    Cardiovascular:   No murmur heard.  Tachycardic   Pulmonary/Chest:   Pleural rub is appreciated over the cardiac silhouette  Decreased breath sounds on left side  No wheezing appreciated   Abdominal: Soft. He exhibits no distension. There is no tenderness. There is no rebound and no guarding.   Musculoskeletal: He exhibits no edema or tenderness.   Neurological: He is alert and oriented to person, place, and time.   Skin: Skin is warm.   Psychiatric: He has a normal mood and affect.       Medications  Current Facility-Administered Medications   Medication Dose Route Frequency Provider Last Rate Last Dose   • senna-docusate (PERICOLACE or SENOKOT S) 8.6-50 MG per tablet 2 Tab  2 Tab Oral BID Carrington Harden M.D.   Stopped at 09/23/18 0600    And   • polyethylene glycol/lytes (MIRALAX) PACKET 1 Packet  1 Packet Oral QDAY PRN Carrington Harden M.D.        And   • magnesium hydroxide (MILK OF MAGNESIA) suspension 30 mL  30 mL Oral QDAY PRN Carrington Harden M.D.        And   • bisacodyl (DULCOLAX) suppository 10 mg  10 mg Rectal QDAY PRN Carrington Harden M.D.       • Respiratory Care per Protocol   Nebulization Continuous RT Carrington Harden M.D.       • 0.9 % NaCl with KCl 20 mEq infusion   Intravenous Continuous Riki Osorio M.D. 75 mL/hr at 09/22/18 2057     • acetaminophen (TYLENOL) tablet 650 mg  650 mg Oral Q6HRS PRBEENA Harden M.D.       • Pharmacy Consult Request ...Pain Management Review   Other PRN Carrington Harden M.D.        And   • oxyCODONE immediate-release (ROXICODONE) tablet 2.5 mg  2.5 mg Oral Q3HRS PRBEENA Harden M.D.        And   • oxyCODONE immediate-release (ROXICODONE) tablet 5 mg  5 mg Oral Q3HRS PRBEENA Harden M.D.   5 mg at 09/22/18 2051    And   • morphine (pf) 4 mg/ml injection 2 mg  2 mg Intravenous Q3HRS PRBEENA Harden M.D.       • ondansetron (ZOFRAN) syringe/vial injection 4 mg  4 mg Intravenous Q4HRS PRN Carrington Harden M.D.        • ondansetron (ZOFRAN ODT) dispertab 4 mg  4 mg Oral Q4HRS PRN Carrington Harden M.D.       • promethazine (PHENERGAN) tablet 12.5-25 mg  12.5-25 mg Oral Q4HRS PRN Carrington Harden M.D.       • promethazine (PHENERGAN) suppository 12.5-25 mg  12.5-25 mg Rectal Q4HRS PRN Carrington Harden M.D.       • prochlorperazine (COMPAZINE) injection 5-10 mg  5-10 mg Intravenous Q4HRS PRN Carrington Harden M.D.       • famotidine (PEPCID) tablet 20 mg  20 mg Oral BID Carrington Harden M.D.        Or   • famotidine (PEPCID) injection 20 mg  20 mg Intravenous BID Carrington Harden M.D.   20 mg at 09/23/18 0434   • MD Alert...Vancomycin per Pharmacy   Other pharmacy to dose Carrington Harden M.D.       • ampicillin/sulbactam (UNASYN) 3 g in  mL IVPB  3 g Intravenous Q6HRS Carrington Harden M.D.   Stopped at 09/23/18 0503   • vancomycin 1,000 mg in  mL IVPB  16 mg/kg Intravenous Q8HR Carrington Harden M.D.   Stopped at 09/23/18 0428   • ipratropium-albuterol (DUONEB) nebulizer solution  3 mL Nebulization Q4HRS WHILE AWAKE Riki Osorio M.D.   3 mL at 09/22/18 8446   • nicotine (NICODERM) 7 MG/24HR 7 mg  7 mg Transdermal Daily-0600 Riki Osorio M.D.   7 mg at 09/23/18 0434   • heparin injection 5,000 Units  5,000 Units Subcutaneous Q8HRS Riki Osorio M.D.   5,000 Units at 09/23/18 0434   • methylPREDNISolone sod succ (SOLU-MEDROL) 125 MG injection 62.5 mg  62.5 mg Intravenous Q6HRS Riki Osorio M.D.   62.5 mg at 09/23/18 0434       Fluids    Intake/Output Summary (Last 24 hours) at 09/23/18 0634  Last data filed at 09/23/18 0000   Gross per 24 hour   Intake          4078.65 ml   Output              600 ml   Net          3478.65 ml       Laboratory  Recent Results (from the past 48 hour(s))   INFLUENZA A/B BY PCR    Collection Time: 09/22/18 12:00 AM   Result Value Ref Range    Influenza virus A RNA Negative Negative    Influenza virus B, PCR Negative Negative   CBC WITH  DIFFERENTIAL    Collection Time: 09/22/18  8:53 AM   Result Value Ref Range    WBC 9.2 4.8 - 10.8 K/uL    RBC 5.64 4.70 - 6.10 M/uL    Hemoglobin 17.2 14.0 - 18.0 g/dL    Hematocrit 47.3 42.0 - 52.0 %    MCV 83.9 81.4 - 97.8 fL    MCH 30.5 27.0 - 33.0 pg    MCHC 36.4 (H) 33.7 - 35.3 g/dL    RDW 35.1 (L) 35.9 - 50.0 fL    Platelet Count 199 164 - 446 K/uL    MPV 11.2 9.0 - 12.9 fL    Nucleated RBC 0.00 /100 WBC    NRBC (Absolute) 0.00 K/uL    Neutrophils-Polys 68.10 44.00 - 72.00 %    Lymphocytes 12.40 (L) 22.00 - 41.00 %    Monocytes 19.50 (H) 0.00 - 13.40 %    Eosinophils 0.00 0.00 - 6.90 %    Basophils 0.00 0.00 - 1.80 %    Neutrophils (Absolute) 6.27 1.82 - 7.42 K/uL    Lymphs (Absolute) 1.14 1.00 - 4.80 K/uL    Monos (Absolute) 1.79 (H) 0.00 - 0.85 K/uL    Eos (Absolute) 0.00 0.00 - 0.51 K/uL    Baso (Absolute) 0.00 0.00 - 0.12 K/uL    Anisocytosis 1+    COMP METABOLIC PANEL    Collection Time: 09/22/18  8:53 AM   Result Value Ref Range    Sodium 133 (L) 135 - 145 mmol/L    Potassium 3.5 (L) 3.6 - 5.5 mmol/L    Chloride 96 96 - 112 mmol/L    Co2 28 20 - 33 mmol/L    Anion Gap 9.0 0.0 - 11.9    Glucose 131 (H) 65 - 99 mg/dL    Bun 15 8 - 22 mg/dL    Creatinine 1.06 0.50 - 1.40 mg/dL    Calcium 9.3 8.5 - 10.5 mg/dL    AST(SGOT) 22 12 - 45 U/L    ALT(SGPT) 12 2 - 50 U/L    Alkaline Phosphatase 74 30 - 99 U/L    Total Bilirubin 1.3 0.1 - 1.5 mg/dL    Albumin 3.7 3.2 - 4.9 g/dL    Total Protein 7.2 6.0 - 8.2 g/dL    Globulin 3.5 1.9 - 3.5 g/dL    A-G Ratio 1.1 g/dL   LIPASE    Collection Time: 09/22/18  8:53 AM   Result Value Ref Range    Lipase 11 11 - 82 U/L   ESTIMATED GFR    Collection Time: 09/22/18  8:53 AM   Result Value Ref Range    GFR If African American >60 >60 mL/min/1.73 m 2    GFR If Non African American >60 >60 mL/min/1.73 m 2   DIFFERENTIAL MANUAL    Collection Time: 09/22/18  8:53 AM   Result Value Ref Range    Manual Diff Status PERFORMED    PERIPHERAL SMEAR REVIEW    Collection Time: 09/22/18  8:53  AM   Result Value Ref Range    Peripheral Smear Review see below    PLATELET ESTIMATE    Collection Time: 09/22/18  8:53 AM   Result Value Ref Range    Plt Estimation Normal    MORPHOLOGY    Collection Time: 09/22/18  8:53 AM   Result Value Ref Range    RBC Morphology Present     Ovalocytes 1+     Dohle Bodies Few    URINALYSIS CULTURE, IF INDICATED    Collection Time: 09/22/18  9:45 AM   Result Value Ref Range    Color Yellow     Character Clear     Specific Gravity 1.010 <1.035    Ph 6.0 5.0 - 8.0    Glucose Negative Negative mg/dL    Ketones Negative Negative mg/dL    Protein 30 (A) Negative mg/dL    Bilirubin Negative Negative    Urobilinogen, Urine 1.0 Negative    Nitrite Negative Negative    Leukocyte Esterase Negative Negative    Occult Blood Small (A) Negative    Micro Urine Req Microscopic    URINE MICROSCOPIC (W/UA)    Collection Time: 09/22/18  9:45 AM   Result Value Ref Range    WBC 0-2 (A) /hpf    RBC 2-5 (A) /hpf    Bacteria Negative None /hpf    Epithelial Cells Negative /hpf    Hyaline Cast 0-2 /lpf   LACTIC ACID    Collection Time: 09/22/18  2:07 PM   Result Value Ref Range    Lactic Acid 2.1 (H) 0.5 - 2.0 mmol/L   ABG - LAB    Collection Time: 09/22/18  4:58 PM   Result Value Ref Range    Ph 7.45 7.40 - 7.50    Pco2 38.3 (H) 26.0 - 37.0 mmHg    Po2 46.6 (LL) 64.0 - 87.0 mmHg    O2 Saturation 82.8 (L) 93.0 - 99.0 %    Hco3 26 (H) 17 - 25 mmol/L    Base Excess 2 -4 - 3 mmol/L    Body Temp see below Centigrade   Procalcitonin    Collection Time: 09/22/18  5:10 PM   Result Value Ref Range    Procalcitonin 31.60 (H) <0.25 ng/mL   TSH (Thyroid Stimulating Hormone)    Collection Time: 09/22/18  5:10 PM   Result Value Ref Range    TSH 0.340 (L) 0.380 - 5.330 uIU/mL   Magnesium    Collection Time: 09/22/18  5:10 PM   Result Value Ref Range    Magnesium 2.2 1.5 - 2.5 mg/dL   EKG    Collection Time: 09/22/18  7:06 PM   Result Value Ref Range    Report       [facility]    Test Date:  2018-09-22  Pt  Name:    MARIA LUZ TAYLOR                 Department:   MRN:        [id number]                      Room:       [facility]  Gender:     Male                         Technician: ABNER  :        1990                   Requested By:JONES CASEY  Order #:    [id number]                    Shree MD:    Measurements  Intervals                                Axis  Rate:       121                          P:          49  HI:         150                          QRS:        -26  QRSD:       87                           T:          46  QT:         304  QTc:        432    Interpretive Statements  SINUS TACHYCARDIA  BORDERLINE LEFT AXIS DEVIATION  No previous ECG available for comparison     COMP METABOLIC PANEL    Collection Time: 18  8:09 PM   Result Value Ref Range    Sodium 136 135 - 145 mmol/L    Potassium 4.1 3.6 - 5.5 mmol/L    Chloride 103 96 - 112 mmol/L    Co2 25 20 - 33 mmol/L    Anion Gap 8.0 0.0 - 11.9    Glucose 100 (H) 65 - 99 mg/dL    Bun 13 8 - 22 mg/dL    Creatinine 0.95 0.50 - 1.40 mg/dL    Calcium 8.1 (L) 8.5 - 10.5 mg/dL    AST(SGOT) 26 12 - 45 U/L    ALT(SGPT) 11 2 - 50 U/L    Alkaline Phosphatase 55 30 - 99 U/L    Total Bilirubin 1.0 0.1 - 1.5 mg/dL    Albumin 2.8 (L) 3.2 - 4.9 g/dL    Total Protein 5.6 (L) 6.0 - 8.2 g/dL    Globulin 2.8 1.9 - 3.5 g/dL    A-G Ratio 1.0 g/dL   ESTIMATED GFR    Collection Time: 18  8:09 PM   Result Value Ref Range    GFR If African American >60 >60 mL/min/1.73 m 2    GFR If Non African American >60 >60 mL/min/1.73 m 2   T3 FREE    Collection Time: 18  3:06 AM   Result Value Ref Range    T3,Free 2.52 2.40 - 4.20 pg/mL   FREE THYROXINE    Collection Time: 18  3:06 AM   Result Value Ref Range    Free T-4 1.40 0.53 - 1.43 ng/dL   CBC with Differential    Collection Time: 18  3:06 AM   Result Value Ref Range    WBC 10.5 4.8 - 10.8 K/uL    RBC 4.49 (L) 4.70 - 6.10 M/uL    Hemoglobin 13.9 (L) 14.0 - 18.0 g/dL    Hematocrit 37.6 (L) 42.0 - 52.0 %     MCV 83.7 81.4 - 97.8 fL    MCH 31.0 27.0 - 33.0 pg    MCHC 37.0 (H) 33.7 - 35.3 g/dL    RDW 36.4 35.9 - 50.0 fL    Platelet Count 174 164 - 446 K/uL    MPV 10.9 9.0 - 12.9 fL    Nucleated RBC 0.00 /100 WBC    NRBC (Absolute) 0.00 K/uL    Neutrophils-Polys 75.90 (H) 44.00 - 72.00 %    Lymphocytes 14.60 (L) 22.00 - 41.00 %    Monocytes 4.30 0.00 - 13.40 %    Eosinophils 0.00 0.00 - 6.90 %    Basophils 0.00 0.00 - 1.80 %    Neutrophils (Absolute) 8.42 (H) 1.82 - 7.42 K/uL    Lymphs (Absolute) 1.53 1.00 - 4.80 K/uL    Monos (Absolute) 0.45 0.00 - 0.85 K/uL    Eos (Absolute) 0.00 0.00 - 0.51 K/uL    Baso (Absolute) 0.00 0.00 - 0.12 K/uL    Anisocytosis 1+     Macrocytosis 1+    Comp Metabolic Panel (CMP)    Collection Time: 09/23/18  3:06 AM   Result Value Ref Range    Sodium 139 135 - 145 mmol/L    Potassium 3.9 3.6 - 5.5 mmol/L    Chloride 106 96 - 112 mmol/L    Co2 25 20 - 33 mmol/L    Anion Gap 8.0 0.0 - 11.9    Glucose 108 (H) 65 - 99 mg/dL    Bun 13 8 - 22 mg/dL    Creatinine 0.90 0.50 - 1.40 mg/dL    Calcium 8.6 8.5 - 10.5 mg/dL    AST(SGOT) 20 12 - 45 U/L    ALT(SGPT) 10 2 - 50 U/L    Alkaline Phosphatase 56 30 - 99 U/L    Total Bilirubin 1.0 0.1 - 1.5 mg/dL    Albumin 2.9 (L) 3.2 - 4.9 g/dL    Total Protein 5.7 (L) 6.0 - 8.2 g/dL    Globulin 2.8 1.9 - 3.5 g/dL    A-G Ratio 1.0 g/dL   ESTIMATED GFR    Collection Time: 09/23/18  3:06 AM   Result Value Ref Range    GFR If African American >60 >60 mL/min/1.73 m 2    GFR If Non African American >60 >60 mL/min/1.73 m 2   PLATELET ESTIMATE    Collection Time: 09/23/18  3:06 AM   Result Value Ref Range    Plt Estimation Normal    MORPHOLOGY    Collection Time: 09/23/18  3:06 AM   Result Value Ref Range    RBC Morphology Present     Poikilocytosis 1+    PERIPHERAL SMEAR REVIEW    Collection Time: 09/23/18  3:06 AM   Result Value Ref Range    Peripheral Smear Review see below    DIFFERENTIAL MANUAL    Collection Time: 09/23/18  3:06 AM   Result Value Ref Range     Bands-Stabs 4.30 0.00 - 10.00 %    Progranulocytes 0.90 %    Manual Diff Status PERFORMED        Imaging  X-Ray:  I have personally reviewed the images and compared with prior images. and My impression is: Left lower lobe pneumonia worsening pneumomediastinum improved right lung infiltrate    Assessment/Plan  Tobacco abuse   Assessment & Plan    History of tobacco use will supplement with nicotine patch  Counseled patient        Esophageal rupture   Assessment & Plan    Patient likely with esophageal rupture or Sandra-Oakes tear that has since closed  Patient will be n.p.o.  IV broad-spectrum antibiotics consider antifungal if worsen  PPI  No EGD indicated because this can cause rupture  However new therapies include stent  Depending on patient's course patient might need thoracic surgery involvement if concerns for empyema develop        Sepsis (HCC)   Assessment & Plan    Patients with finding of sepsis multifocal pneumonia tachypnea tachycardia  Continue to monitor fluid resuscitation  Monitor for hypotension requiring pressor support  Serial lactate if hypotension        Abdominal pain   Assessment & Plan    Lipase negative LFTs unremarkable  Continue to monitor with serial abdominal exams        N&V (nausea and vomiting)   Assessment & Plan    Aggressive anti-emetics to prevent further esophageal rupture        Pneumomediastinum (HCC)   Assessment & Plan    Supplemental oxygen to help with rapid reaccumulation  Avoid PEEP at all costs would first go to high flow nasal cannula before any positive pressure        Pneumonia   Assessment & Plan    Broad-spectrum antibiotics with Unasyn and vancomycin  We will check MRSA nasal swab and hopefully de-escalate vancomycin  Consider antifungal coverage for esophageal leak  Continue to monitor for possible empyema               VTE:  Heparin  Ulcer: PPI  Lines: Central Line  N/A    I have performed a physical exam and reviewed and updated ROS and Plan today (9/23/2018).  In review of yesterday's note (9/22/2018), there are no changes except as documented above.     Discussed patient condition and risk of morbidity and/or mortality with Hospitalist, Family, RN, RT, Charge nurse / hot rounds and Patient  The patient remains critically ill from esophageal rupture and multifocal pneumonia and pneumomediastinum.

## 2018-09-23 NOTE — PROGRESS NOTES
"Pharmacy Kinetics 27 y.o. male on vancomycin day # 1   2018    Vancomycin New Start    : Vanco load 1500 mg IV at 1837     Indication for Treatment: Empiric - pneumonia    Pertinent history per medical record: Admitted on 2018 for pneumomediastinum suspected d/t suspected esophageal perforation. Empiric antibiotics initiated for treatment of his pneumonia.    Other antibiotics: ampicillin/sulbactam 3 g IV q6hrs    Allergies: Patient has no known allergies.     Concerns for renal function include SCr elevated from baseline & contrast for CT on .    Pertinent cultures to date:   : respiratory cx - pending collection  : peripheral blood cx X2 - in process  : urine cx - in process  : Influenza A/B - Negative    Imagin/22: CT chest - Pneumomediastinum as seen on recent chest x-ray. Extensive multifocal pneumonia particularly involving the LEFT upper and lower lobes.  Multifocal ill-defined parenchymal opacities in the RIGHT lung.  Follow-up recommended to resolution to exclude underlying process.  : CXR - Pneumomediastinum and possible tiny RIGHT apical pneumothorax. Extensive LEFT mid and lower lung consolidation, likely pneumonia. Possible minimal RIGHT midlung infiltrate. This constellation of findings is concerning for esophageal perforation.    Recent Labs      18   0853   WBC  9.2   NEUTSPOLYS  68.10     Recent Labs      18   0853   BUN  15   CREATININE  1.06   ALBUMIN  3.7     No results for input(s): VANCOTROUGH, VANCOPEAK, VANCORANDOM in the last 72 hours.    No intake or output data in the 24 hours ending 18 1739     Blood pressure (!) 98/67, pulse (!) 129, temperature 37.8 °C (100.1 °F), resp. rate 20, height 1.651 m (5' 5\"), weight 60 kg (132 lb 4.4 oz), SpO2 (!) 86 %. Temp (24hrs), Av.3 °C (99.2 °F), Min:36.8 °C (98.2 °F), Max:37.8 °C (100.1 °F)      A/P   1. Vancomycin dose change: Start vanco 1000 mg IV q8hrs  2. Next vancomycin level: 2 days " (not currently ordered)  3. Goal trough: 16-20 mcg/mL  4. Comments: Empiric vancomycin initiated for treatment of pneumonia in patient with low risk for accumulation. Renal indices slightly elevated from baseline but will likely normalize following fluid resuscitation. Blood and urine cultures in process, respiratory cultres pending collection. Will start scheduled vanco per protocol and plan trough on 9/24 to evaluate and adjust as necessary. Will follow cultures and recommend de-escalation of antibiotics if continued MRSA coverage is no longer indicated.    Pharmacy will continue to follow.     Rhonda Fermin, CandiD

## 2018-09-23 NOTE — H&P
DATE OF ADMISSION:  09/22/2018    IDENTIFICATION:  This is a 27-year-old male.    PRIMARY CARE PHYSICIAN:  Claire Quijano MD    CHIEF COMPLAINT:  Nausea, vomiting, body aches, fevers.    HISTORY OF PRESENT ILLNESS:  This is a 27-year-old male.  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  The patient states that over the last 6 days, he   had not been feeling well.  He had a cough, chest pain, nausea, vomiting, and   abdominal pain.  The patient presents with these complaints to the ER where   he was found to have bilateral pulmonary infiltrates and pneumomediastinum.    The patient in later conversations admits to fevers, chills, and sweating.  He   has had some headaches and some pleuritic chest pain.  The patient is   referred to me for admission.  He is also referred to gastroenterology in   light of his pneumomediastinum as well as pulmonary medicine for evaluation.    The patient denies any other recent sick contact.  He is a chronic smoker, a   pack lasting for approximately 4 days and he has been smoking for the last 9   years approximately.  The patient will be admitted for further evaluation and   treatment.  The case was discussed with multiple consultants.    ALLERGIES:  None.    PRESCRIPTION MEDICATIONS:  Outpatient none.  The patient had been treated in   the past for hyperthyroidism.    PAST MEDICAL HISTORY:  Negative except for some hyperactive thyroid.    PAST SURGICAL HISTORY:  Negative.    SOCIAL HISTORY:  Patient smokes 5 cigarettes daily for the last 9 years.    Alcohol none.  Drugs none.    FAMILY HISTORY:  Positive for diabetes.    REVIEW OF SYSTEMS:  CONSTITUTIONAL:  Positive fevers, some mild chills.  HEENT:  Some headaches.  CARDIOVASCULAR:  With left-sided chest discomfort.  PULMONARY:  Positive shortness of breath, cough, and wet sputum.  GASTROINTESTINAL:  No nausea or vomiting.  Positive diarrhea and abdominal   discomfort in the epigastrium.  GENITOURINARY:  No dysuria or  hematuria.  MUSCULOSKELETAL:  Without back pain or joint pain.  NEUROLOGIC:  Without focal weakness, numbness, or tingling.    PHYSICAL EXAMINATION:  VITAL SIGNS:  The patient is found to have temperature 36.8, pulse 115,   respirations 16, blood pressure 98/67, the patient is saturating 95% on room   air initially.  GENERAL:  This is a pleasant male in no acute distress, no acute respiratory   distress.  Well developed, well nourished.  HEENT:  Normocephalic, atraumatic.  EOMI.  PERRLA.  Mucous membranes are   moist.  Nasopharynx is otherwise clear.  NECK:  Stiff range of motion.  No lymphadenopathy or thyromegaly.  CHEST:  Normal bony structures.  LUNGS:  Bilateral coarse rhonchi as well as rales with left upper lobe   dullness to percussion.  ABDOMEN:  Protuberant.  There is no tenderness, no distention.  Positive bowel   sounds in all 4 quadrants.  HEART:  Tachycardia.  S1, S2 appear normal.  GENITOURINARY:  Normal external male genitalia.  RECTAL:  Deferred.  MUSCULOSKELETAL:  No clubbing, cyanosis, or edema.  NEUROLOGIC:  The patient is alert and oriented x4.  Cranial nerves II-XII are   grossly intact.  No sensory loss is elicited.    LABORATORY DATA AND IMAGING:  The patient has a white cell count of 9.3,   hemoglobin 17.3, hematocrit 47.3, platelet count 199, neutrophils 68,   monocytes 19.  Chemistry with a sodium of 132, potassium 3.5, chloride 96,   bicarbonate 28, glucose 131, BUN is 15, creatinine 1.06, otherwise normal.    Magnesium is normal.  Lipase normal.  Lactic acid 2.1.  Urinalysis is   negative.  Blood gas shows a pH of 7.45, pCO2 of 38 and pO2 of 46, this is on   room air at 82%.  TSH 0.34.  CT scan of the chest shows pneumomediastinum,   extensive multifocal pneumonia, particularly involving the left upper and   lower lobe, multiple ill-defined parenchymal opacities in the right lung.  No   pneumothorax.  No extravasation of oral contrast into the mediastinum.    ASSESSMENT AND PLAN:  1.   Extensive pneumonia, bilateral more so on the left.  The patient is not   significantly hypoxic, but certainly at high risk.  The patient is referred to   pulmonary medicine.  The patient will be moved to ICU, broad-spectrum   antibiotics, and cultures.  The patient will likely require a bronchoscopy.  2.  Pneumomediastinum, question of source possibly pulmonary primary.  GI is   consulted and doubt that the patient had an esophageal event.  The patient   will be closely monitored with close followup and chest x-rays and clinical   development.  3.  Nausea, vomiting without other abdominal pathology.  Apparently, the   patient will be monitored closely.  This is likely secondary to his infectious   disease.  4.  Significant hypoxemia.  5.  Hypokalemia.  6.  Hyponatremia.  7.  History of hyperactive thyroid.    OVERALL PLAN:  The patient at this time is admitted to ICU with   pneumomediastinum and bilateral pneumonia.  The patient has significant   illness.  Initially, he did not appear to be septic, although certainly at   significant risk, being admitted at this time with a tentative sepsis   diagnosis.  The patient's vascular exam was normal.  There were positive   pulses.  There was good capillary refill.  The patient's blood pressure was   sufficient, please refer to the above data.    Time of admission is 55 minutes.  The patient is significantly ill.  He will   require 2+ overnight stays in the hospital.       ____________________________________     MD ANABELA AKINS / NTS    DD:  09/22/2018 20:08:27  DT:  09/22/2018 22:01:13    D#:  [id number]  Job#:  [id number]

## 2018-09-23 NOTE — ASSESSMENT & PLAN NOTE
This is severe sepsis with the following associated acute organ dysfunction(s): acute respiratory failure.     resolved

## 2018-09-23 NOTE — CARE PLAN
Problem: Bowel/Gastric:  Goal: Normal bowel function is maintained or improved   09/23/18 0400 09/23/18 0600   OTHER   Last BM 09/22/18 --    Number of Times Stooled --  0     Strict NPO per GI, senna help this AM. Small loose BM x2 overnight.     Problem: Pain Management  Goal: Pain level will decrease to patient's comfort goal   09/23/18 0600   OTHER   Pain Scale 0 - 10  2   Comfort Goal Comfort at Rest;Comfort with Movement;Sleep Comfortably       Warm packs help with pain.

## 2018-09-23 NOTE — PROGRESS NOTES
[facility] Hospitalist Progress Note    Date of Service: 2018    Chief Complaint  27 y.o. male admitted 2018 with PNA and pneumomediastinum    Interval Problem Update  SR-ST  Afebrile  UO 1.1 L  On 15l oxymask    CXR reviewed worsening pneumomedistinum   Day 2 of Vanc and unasyn   Blood cultures growing possible strep    Consultants/Specialty  Critical care  GI    Disposition  ICU monitoring        Review of Systems   Constitutional: Positive for malaise/fatigue. Negative for fever.   HENT: Negative for congestion, ear discharge and nosebleeds.    Eyes: Negative for blurred vision, pain, discharge and redness.   Respiratory: Positive for cough, sputum production and shortness of breath. Negative for hemoptysis and stridor.    Cardiovascular: Negative for chest pain, palpitations, orthopnea and leg swelling.   Gastrointestinal: Negative for abdominal pain, blood in stool, diarrhea, melena, nausea and vomiting.   Genitourinary: Negative for dysuria, flank pain and hematuria.   Musculoskeletal: Negative for back pain, falls, joint pain and neck pain.   Skin: Negative.    Neurological: Negative for speech change, focal weakness, seizures, loss of consciousness, weakness and headaches.   Psychiatric/Behavioral: Negative for hallucinations, memory loss and substance abuse. The patient is not nervous/anxious.    All other systems reviewed and are negative.     Physical Exam  Laboratory/Imaging   Hemodynamics  Temp (24hrs), Av.8 °C (98.3 °F), Min:36.3 °C (97.3 °F), Max:37.8 °C (100.1 °F)   Temperature: 36.8 °C (98.2 °F)  Pulse  Av.9  Min: 81  Max: 143 Heart Rate (Monitored): 93  Blood Pressure: (!) 98/67, NIBP: (!) 96/68      Respiratory      Respiration: (!) 37, Pulse Oximetry: 99 %, O2 Daily Delivery Respiratory : OxyMask (per Dr Osorio)     Given By:: Mouthpiece, Work Of Breathing / Effort: Mild  RUL Breath Sounds: Clear, RML Breath Sounds: Diminished, RLL Breath Sounds: Diminished, PIETER Breath  Sounds: Clear, LLL Breath Sounds: Diminished    Fluids    Intake/Output Summary (Last 24 hours) at 09/23/18 0904  Last data filed at 09/23/18 0800   Gross per 24 hour   Intake          5128.65 ml   Output             1150 ml   Net          3978.65 ml       Nutrition  Orders Placed This Encounter   Procedures   • Diet NPO     Standing Status:   Standing     Number of Occurrences:   1     Order Specific Question:   Type:     Answer:   Now [1]     Order Specific Question:   Restrict to:     Answer:   Strict [1]     Physical Exam   Constitutional: He is oriented to person, place, and time. He appears well-developed and well-nourished.   HENT:   Head: Normocephalic and atraumatic.   Right Ear: External ear normal.   Left Ear: External ear normal.   Mouth/Throat: No oropharyngeal exudate.   Eyes: Conjunctivae are normal. Right eye exhibits no discharge. Left eye exhibits no discharge. No scleral icterus.   Neck: Neck supple. No JVD present. No tracheal deviation present.   Cardiovascular: Normal rate and regular rhythm.  Exam reveals no gallop and no friction rub.    No murmur heard.  Pulmonary/Chest: Effort normal. No stridor. No respiratory distress. He has no wheezes. He has rales. He exhibits no tenderness.   Abdominal: Soft. Bowel sounds are normal. He exhibits no distension. There is no tenderness. There is no rebound and no guarding.   Musculoskeletal: He exhibits no edema or tenderness.   Neurological: He is alert and oriented to person, place, and time. No cranial nerve deficit. He exhibits normal muscle tone.   Skin: Skin is warm and dry. He is not diaphoretic. No cyanosis. Nails show no clubbing.   Psychiatric: He has a normal mood and affect. His behavior is normal. Thought content normal.   Nursing note and vitals reviewed.      Recent Labs      09/22/18   0853  09/23/18   0306   WBC  9.2  10.5   RBC  5.64  4.49*   HEMOGLOBIN  17.2  13.9*   HEMATOCRIT  47.3  37.6*   MCV  83.9  83.7   MCH  30.5  31.0   MCHC   36.4*  37.0*   RDW  35.1*  36.4   PLATELETCT  199  174   MPV  11.2  10.9     Recent Labs      09/22/18   0853  09/22/18 2009 09/23/18   0306   SODIUM  133*  136  139   POTASSIUM  3.5*  4.1  3.9   CHLORIDE  96  103  106   CO2  28  25  25   GLUCOSE  131*  100*  108*   BUN  15  13  13   CREATININE  1.06  0.95  0.90   CALCIUM  9.3  8.1*  8.6                      Assessment/Plan     Acute respiratory failure with hypoxia (HCC)   Assessment & Plan    Continue oxygen antibiotics for pneumonia and bronchodilators per RT protocol  Discussed with Dr Ge Protocol  DisContinueContinue close monitoring in the ICU given high oxygen requirements        Esophageal rupture   Assessment & Plan    Likely microperforation secondary to nausea and vomiting with pneumomediastinum  Evaluated by GI  Prilosec continue current antibiotics            Sepsis ([facility])   Assessment & Plan    This is severe sepsis with the following associated acute organ dysfunction(s): acute respiratory failure.     Continue IV fluids and current antibiotics        N&V (nausea and vomiting)   Assessment & Plan    Improved continue antiemetics as needed  Start diet when cleared by GI        Pneumomediastinum ([facility])   Assessment & Plan    Continue close monitoring in current antibiotics        Pneumonia- (present on admission)   Assessment & Plan    Blood cultures growing Streptococcus    On Unasyn and vancomycin  De-escalate based on final culture and will also check MRSA swab          Quality-Core Measures   Reviewed items::  Labs reviewed, Medications reviewed and Radiology images reviewed  Jimenez catheter::  No Jimenez  DVT prophylaxis pharmacological::  Heparin  Ulcer Prophylaxis::  Yes  Antibiotics:  Treating active infection/contamination beyond 24 hours perioperative coverage      Plan of care reviewed with patient and wife at the bedside and discussed with nursing staff pharmacist and critical care

## 2018-09-23 NOTE — CONSULTS
"Critical Care/Pulmonary Consultation    Date of service: 9/22/2018    Consulting Physician: No att. providers found    Chief Complaint: Shortness of breath    History of Present Illness: Gabriel Lemos is a 27 y.o. male with no significant past medical history presented to the hospital for evaluation of worsening shortness of breath accompanied by chills, fevers, malaise, blood-tinged productive cough, nausea and vomiting, CT chest revealed bilateral pulmonary infiltrative process with pneumomediastinum.  He is admitted to ICU for close monitoring of respiratory status in lieu of severe community-acquired pneumonia, therefore ICU consult.    ROS: As per HPI, otherwise all systems been reviewed and were negative    No current facility-administered medications on file prior to encounter.      No current outpatient prescriptions on file prior to encounter.       Social History   Substance Use Topics   • Smoking status: Current Every Day Smoker     Packs/day: 0.50     Years: 3.00     Types: Cigarettes   • Smokeless tobacco: Never Used   • Alcohol use No        History reviewed. No pertinent past medical history.    History reviewed. No pertinent surgical history.    Allergies: Patient has no known allergies.    History reviewed. No pertinent family history.    Vitals:    09/22/18 0820 09/22/18 0824 09/22/18 0900 09/22/18 0930   Height:  1.651 m (5' 5\")     Weight:  60 kg (132 lb 4.4 oz)     Weight % change since last entry.:  0 %     BP: 105/68      Pulse: (!) 132  (!) 126 (!) 125   BMI (Calculated):  22.01     Resp: 14  14 14   Temp: 36.8 °C (98.2 °F)      TempSrc: Temporal       09/22/18 1000 09/22/18 1130 09/22/18 1218 09/22/18 1230   Height:       Weight:       Weight % change since last entry.:       BP:       Pulse: (!) 127 (!) 125 (!) 124 (!) 125   BMI (Calculated):       Resp: 12 14 12    Temp:       TempSrc:        09/22/18 1316 09/22/18 1323 09/22/18 1330 09/22/18 1400   Height:       Weight:       Weight % " change since last entry.:       BP:  (!) 98/67     Pulse: (!) 119 (!) 115 (!) 111 (!) 117   BMI (Calculated):       Resp:  16 18 20   Temp:       TempSrc:        09/22/18 1430 09/22/18 1500 09/22/18 1530 09/22/18 1600   Height:       Weight:       Weight % change since last entry.:       BP:       Pulse: (!) 117 (!) 111 (!) 122 (!) 122   BMI (Calculated):       Resp: 15 16 18 18   Temp:       TempSrc:        09/22/18 1604 09/22/18 1630 09/22/18 1730 09/22/18 1807   Height:       Weight:       Weight % change since last entry.:       BP:       Pulse: (!) 130 (!) 129 (!) 124 (!) 143   BMI (Calculated):       Resp: 20 20 16 (!) 35   Temp:  37.8 °C (100.1 °F)  37.2 °C (99 °F)   TempSrc:        09/22/18 1816 09/22/18 1830 09/22/18 1831 09/22/18 1845   Height:       Weight:       Weight % change since last entry.:       BP:       Pulse: (!) 127 (!) 122 98 (!) 124   BMI (Calculated):       Resp: (!) 56 (!) 54 (!) 43 (!) 31   Temp:       TempSrc:        09/22/18 1900 09/22/18 2000 09/22/18 2100 09/22/18 2200   Height:       Weight:       Weight % change since last entry.:       BP:       Pulse: (!) 121 (!) 117 (!) 117 (!) 109   BMI (Calculated):       Resp: (!) 39 (!) 40 (!) 38 (!) 23   Temp:  (P) 36.3 °C (97.3 °F)     TempSrc:        09/22/18 2300   Height:    Weight:    Weight % change since last entry.:    BP:    Pulse: (!) 108   BMI (Calculated):    Resp: (!) 29   Temp:    TempSrc:        Physical Examination  General: Slim built  Neuro/Psych: CN II-XII grossly within normal limits, no focal neurological deficits, alert and oriented x4  HEENT: Head is normocephalic, atraumatic, PERRLA, EOMI  CVS: S1-S2 within normal limits, regular rate, no murmurs rubs or gallop  Respiratory: Bronchial breath sounds bilaterally, left worse than right, on anterior and lateral chest, trachea is midline, symmetric expansion of the chest with respirations  Abdomen/: Soft, nontender, nondistended abdomen  Extremities: No bony deformities,  joint swelling, joint erythema  Skin: No skin rashes, bruises, jaundice    Recent Labs      09/22/18   0853  09/22/18 2009   WBC  9.2   --    NEUTSPOLYS  68.10   --    LYMPHOCYTES  12.40*   --    MONOCYTES  19.50*   --    EOSINOPHILS  0.00   --    BASOPHILS  0.00   --    ASTSGOT  22  26   ALTSGPT  12  11   ALKPHOSPHAT  74  55   TBILIRUBIN  1.3  1.0     Recent Labs      09/22/18   0853  09/22/18   1710  09/22/18 2009   SODIUM  133*   --   136   POTASSIUM  3.5*   --   4.1   CHLORIDE  96   --   103   CO2  28   --   25   BUN  15   --   13   CREATININE  1.06   --   0.95   MAGNESIUM   --   2.2   --    CALCIUM  9.3   --   8.1*     Recent Labs      09/22/18   0853  09/22/18 2009   ALTSGPT  12  11   ASTSGOT  22  26   ALKPHOSPHAT  74  55   TBILIRUBIN  1.3  1.0   LIPASE  11   --    GLUCOSE  131*  100*     Recent Labs      09/22/18   1658   [id number]  7.45   [id number]  38.3*   [id number]  46.6*   [id number]  82.8*   ARTHCO3  26*   ARTBE  2     CT-CHEST (THORAX) WITH   Final Result      1.  Pneumomediastinum as seen on recent chest x-ray.   2.  Extensive multifocal pneumonia particularly involving the LEFT upper and lower lobes.  Multifocal ill-defined parenchymal opacities in the RIGHT lung.  Follow-up recommended to resolution to exclude underlying process.   3.  No pneumothorax demonstrated.   4.  No extravasation of oral contrast into the mediastinum.            DX-ESOPH. FLUORO (BARIUM SWALLOW)   Final Result      Normal caliber esophagus without evidence for esophageal perforation.      DX-CHEST-PORTABLE (1 VIEW)   Final Result      1.  Pneumomediastinum and possible tiny RIGHT apical pneumothorax.   2.  Extensive LEFT mid and lower lung consolidation, likely pneumonia.   3.  Possible minimal RIGHT midlung infiltrate.   4.  This constellation of findings is concerning for esophageal perforation.      These findings were discussed with SOFIA CASEY on 9/22/2018 11:03 AM.             Assessment & Plan       1.  Severe  community acquired pneumonia  -Empiric antibiotics with vancomycin and Unasyn  -Complete short course of systemic steroids  -Blood cultures pending  -May need diagnostic bronchoscopy if worsening clinical course of pneumonia  -Bronchodilators    2.  Acute hypoxemic rest supplemental oxygen  -Due to severe community-acquired pneumonia  -Supplemental oxygen to keep oxygen saturation level above 92%    3.  Pneumomediastinum  -?  Esophageal microperforation  -GI is following  -Supplemental oxygen via oximask for faster resolution  -N.p.o. except meds and ice chips    4.  Active smoking  -Smoking cessation counseling provided  -Nicotine replacement therapy with nicotine patches    5.  ICU prophylaxis  -Heparin subcu and Pepcid    Family meeting: I spent 15 minutes at the bedside with patient's girlfriend discussing his clinical condition and plan of management    Critical care time: I spent 40 minutes personally providing critical care services including formulating plan of care.  This time was exclusive to this patient and does not include time spent in procedures.

## 2018-09-23 NOTE — CARE PLAN
Problem: Hyperinflation:  Goal: Prevent or improve atelectasis    Intervention: Perform hyperinflation therapy as indicated by assessment  Respiratory Therapy Update    Interdisciplinary Plan of Care-Goals (Indications)  Bronchodilator Indications: History / Diagnosis         Cough: Productive;Moist    Sputum Color: Pink Tinged;Tan   Sputum Consistency: Thick        O2 (LPM): 10  O2 Daily Delivery Respiratory : OxyMask    Breath Sounds  RUL Breath Sounds: Clear   RML Breath Sounds: Diminished   RLL Breath Sounds: Diminished   PIETER Breath Sounds: Clear   LLL Breath Sounds: Diminished      Events/Summary/Plan: IS done.

## 2018-09-23 NOTE — ASSESSMENT & PLAN NOTE
Cont supplemental O2  Avoid PEP, encourage IS  Daily CXR to monitor  Possible bronch evaluation if persists/worsens

## 2018-09-24 ENCOUNTER — APPOINTMENT (OUTPATIENT)
Dept: RADIOLOGY | Facility: MEDICAL CENTER | Age: 28
DRG: 871 | End: 2018-09-24
Attending: INTERNAL MEDICINE
Payer: COMMERCIAL

## 2018-09-24 PROBLEM — R00.0 TACHYCARDIA: Status: RESOLVED | Noted: 2018-09-22 | Resolved: 2018-09-24

## 2018-09-24 LAB
ANION GAP SERPL CALC-SCNC: 7 MMOL/L (ref 0–11.9)
ANISOCYTOSIS BLD QL SMEAR: ABNORMAL
BASOPHILS # BLD AUTO: 0 % (ref 0–1.8)
BASOPHILS # BLD: 0 K/UL (ref 0–0.12)
BUN SERPL-MCNC: 15 MG/DL (ref 8–22)
CALCIUM SERPL-MCNC: 9.2 MG/DL (ref 8.5–10.5)
CHLORIDE SERPL-SCNC: 105 MMOL/L (ref 96–112)
CO2 SERPL-SCNC: 27 MMOL/L (ref 20–33)
CREAT SERPL-MCNC: 0.82 MG/DL (ref 0.5–1.4)
EKG IMPRESSION: NORMAL
EOSINOPHIL # BLD AUTO: 0 K/UL (ref 0–0.51)
EOSINOPHIL NFR BLD: 0 % (ref 0–6.9)
ERYTHROCYTE [DISTWIDTH] IN BLOOD BY AUTOMATED COUNT: 38.6 FL (ref 35.9–50)
ETEST SENSITIVITY ETEST: NORMAL
GLUCOSE SERPL-MCNC: 148 MG/DL (ref 65–99)
HCT VFR BLD AUTO: 39.2 % (ref 42–52)
HGB BLD-MCNC: 14.2 G/DL (ref 14–18)
LYMPHOCYTES # BLD AUTO: 2.55 K/UL (ref 1–4.8)
LYMPHOCYTES NFR BLD: 8.8 % (ref 22–41)
MACROCYTES BLD QL SMEAR: ABNORMAL
MANUAL DIFF BLD: NORMAL
MCH RBC QN AUTO: 30.6 PG (ref 27–33)
MCHC RBC AUTO-ENTMCNC: 36.2 G/DL (ref 33.7–35.3)
MCV RBC AUTO: 84.5 FL (ref 81.4–97.8)
METAMYELOCYTES NFR BLD MANUAL: 1.8 %
MONOCYTES # BLD AUTO: 1.54 K/UL (ref 0–0.85)
MONOCYTES NFR BLD AUTO: 5.3 % (ref 0–13.4)
MORPHOLOGY BLD-IMP: NORMAL
MYELOCYTES NFR BLD MANUAL: 2.6 %
NEUTROPHILS # BLD AUTO: 23.64 K/UL (ref 1.82–7.42)
NEUTROPHILS NFR BLD: 79.8 % (ref 44–72)
NEUTS BAND NFR BLD MANUAL: 1.7 % (ref 0–10)
NRBC # BLD AUTO: 0 K/UL
NRBC BLD-RTO: 0 /100 WBC
PLATELET # BLD AUTO: 207 K/UL (ref 164–446)
PLATELET BLD QL SMEAR: NORMAL
PMV BLD AUTO: 10.8 FL (ref 9–12.9)
POTASSIUM SERPL-SCNC: 4 MMOL/L (ref 3.6–5.5)
RBC # BLD AUTO: 4.64 M/UL (ref 4.7–6.1)
RBC BLD AUTO: PRESENT
SODIUM SERPL-SCNC: 139 MMOL/L (ref 135–145)
WBC # BLD AUTO: 29 K/UL (ref 4.8–10.8)

## 2018-09-24 PROCEDURE — 71045 X-RAY EXAM CHEST 1 VIEW: CPT

## 2018-09-24 PROCEDURE — 99232 SBSQ HOSP IP/OBS MODERATE 35: CPT | Performed by: HOSPITALIST

## 2018-09-24 PROCEDURE — 700105 HCHG RX REV CODE 258: Performed by: HOSPITALIST

## 2018-09-24 PROCEDURE — 80048 BASIC METABOLIC PNL TOTAL CA: CPT

## 2018-09-24 PROCEDURE — 770006 HCHG ROOM/CARE - MED/SURG/GYN SEMI*

## 2018-09-24 PROCEDURE — 700111 HCHG RX REV CODE 636 W/ 250 OVERRIDE (IP): Performed by: HOSPITALIST

## 2018-09-24 PROCEDURE — 700111 HCHG RX REV CODE 636 W/ 250 OVERRIDE (IP): Performed by: INTERNAL MEDICINE

## 2018-09-24 PROCEDURE — A9270 NON-COVERED ITEM OR SERVICE: HCPCS | Performed by: HOSPITALIST

## 2018-09-24 PROCEDURE — 700101 HCHG RX REV CODE 250: Performed by: INTERNAL MEDICINE

## 2018-09-24 PROCEDURE — 700102 HCHG RX REV CODE 250 W/ 637 OVERRIDE(OP): Performed by: INTERNAL MEDICINE

## 2018-09-24 PROCEDURE — 99291 CRITICAL CARE FIRST HOUR: CPT | Performed by: INTERNAL MEDICINE

## 2018-09-24 PROCEDURE — 85027 COMPLETE CBC AUTOMATED: CPT

## 2018-09-24 PROCEDURE — A9270 NON-COVERED ITEM OR SERVICE: HCPCS | Performed by: INTERNAL MEDICINE

## 2018-09-24 PROCEDURE — 700102 HCHG RX REV CODE 250 W/ 637 OVERRIDE(OP): Performed by: HOSPITALIST

## 2018-09-24 PROCEDURE — 85007 BL SMEAR W/DIFF WBC COUNT: CPT

## 2018-09-24 RX ORDER — SODIUM CHLORIDE 9 MG/ML
INJECTION, SOLUTION INTRAVENOUS
Status: ACTIVE
Start: 2018-09-24 | End: 2018-09-24

## 2018-09-24 RX ADMIN — FAMOTIDINE 20 MG: 20 TABLET ORAL at 05:09

## 2018-09-24 RX ADMIN — POTASSIUM CHLORIDE AND SODIUM CHLORIDE: 900; 150 INJECTION, SOLUTION INTRAVENOUS at 13:05

## 2018-09-24 RX ADMIN — CEFTRIAXONE 2 G: 2 INJECTION, POWDER, FOR SOLUTION INTRAMUSCULAR; INTRAVENOUS at 14:30

## 2018-09-24 RX ADMIN — HEPARIN SODIUM 5000 UNITS: 5000 INJECTION, SOLUTION INTRAVENOUS; SUBCUTANEOUS at 13:12

## 2018-09-24 RX ADMIN — OXYCODONE HYDROCHLORIDE 5 MG: 5 TABLET ORAL at 09:10

## 2018-09-24 RX ADMIN — AMPICILLIN AND SULBACTAM 3 G: 2; 1 INJECTION, POWDER, FOR SOLUTION INTRAVENOUS at 05:08

## 2018-09-24 RX ADMIN — FAMOTIDINE 20 MG: 20 TABLET ORAL at 17:37

## 2018-09-24 RX ADMIN — HEPARIN SODIUM 5000 UNITS: 5000 INJECTION, SOLUTION INTRAVENOUS; SUBCUTANEOUS at 05:08

## 2018-09-24 RX ADMIN — ACETAMINOPHEN 650 MG: 325 TABLET, FILM COATED ORAL at 07:14

## 2018-09-24 RX ADMIN — NICOTINE 7 MG: 7 PATCH, EXTENDED RELEASE TRANSDERMAL at 05:08

## 2018-09-24 RX ADMIN — HEPARIN SODIUM 5000 UNITS: 5000 INJECTION, SOLUTION INTRAVENOUS; SUBCUTANEOUS at 21:16

## 2018-09-24 RX ADMIN — AMPICILLIN AND SULBACTAM 3 G: 2; 1 INJECTION, POWDER, FOR SOLUTION INTRAVENOUS at 01:40

## 2018-09-24 RX ADMIN — AMPICILLIN AND SULBACTAM 3 G: 2; 1 INJECTION, POWDER, FOR SOLUTION INTRAVENOUS at 13:05

## 2018-09-24 ASSESSMENT — PAIN SCALES - GENERAL
PAINLEVEL_OUTOF10: 5
PAINLEVEL_OUTOF10: 0
PAINLEVEL_OUTOF10: 0
PAINLEVEL_OUTOF10: 4
PAINLEVEL_OUTOF10: 0
PAINLEVEL_OUTOF10: 5
PAINLEVEL_OUTOF10: 4
PAINLEVEL_OUTOF10: 0
PAINLEVEL_OUTOF10: 5
PAINLEVEL_OUTOF10: 0
PAINLEVEL_OUTOF10: 9
PAINLEVEL_OUTOF10: 5

## 2018-09-24 ASSESSMENT — ENCOUNTER SYMPTOMS
SORE THROAT: 0
SLEEP DISTURBANCE: 1
BACK PAIN: 0
EYE PAIN: 0
STRIDOR: 0
TROUBLE SWALLOWING: 0
FOCAL WEAKNESS: 0
SPUTUM PRODUCTION: 1
EYE REDNESS: 0
EYE ITCHING: 0
HEADACHES: 0
NECK PAIN: 0
SPEECH DIFFICULTY: 0
SEIZURES: 0
FLANK PAIN: 0
BACK PAIN: 1
SHORTNESS OF BREATH: 1
VOMITING: 0
SHORTNESS OF BREATH: 0
FALLS: 0
CONFUSION: 0
ORTHOPNEA: 0
PALPITATIONS: 0
FATIGUE: 1
HALLUCINATIONS: 0
NAUSEA: 0
CHILLS: 0
NERVOUS/ANXIOUS: 0
DIZZINESS: 0
ABDOMINAL PAIN: 0
FEVER: 0
WHEEZING: 0
BRUISES/BLEEDS EASILY: 0
CONSTIPATION: 0
SPEECH CHANGE: 0
EYE DISCHARGE: 0
HEARTBURN: 0
DIARRHEA: 0
WEAKNESS: 1
MEMORY LOSS: 0
LOSS OF CONSCIOUSNESS: 0
HEMOPTYSIS: 0
COUGH: 1
CHEST TIGHTNESS: 0

## 2018-09-24 ASSESSMENT — PATIENT HEALTH QUESTIONNAIRE - PHQ9
2. FEELING DOWN, DEPRESSED, IRRITABLE, OR HOPELESS: NOT AT ALL
1. LITTLE INTEREST OR PLEASURE IN DOING THINGS: NOT AT ALL
SUM OF ALL RESPONSES TO PHQ9 QUESTIONS 1 AND 2: 0
1. LITTLE INTEREST OR PLEASURE IN DOING THINGS: NOT AT ALL
SUM OF ALL RESPONSES TO PHQ9 QUESTIONS 1 AND 2: 0

## 2018-09-24 ASSESSMENT — LIFESTYLE VARIABLES
ALCOHOL_USE: NO
SUBSTANCE_ABUSE: 0

## 2018-09-24 NOTE — PROGRESS NOTES
Gastroenterology Progress Note     Author: Riki Blevins   Date & Time Created: 9/24/2018 2:04 PM    Chief Complaint:  Chest pain, shortness of breath    Interval History:  Pt reports that he had some low heart rates last night. He reports that his chest pain has resolved. His Shortness of breath is only present on exertion. He denies fevers or chills.    Review of Systems:  Review of Systems   Constitutional: Positive for malaise/fatigue. Negative for chills and fever.   Respiratory: Positive for shortness of breath. Negative for wheezing.    Cardiovascular: Negative for chest pain and palpitations.   Gastrointestinal: Negative for abdominal pain, constipation, diarrhea, heartburn, nausea and vomiting.   Neurological: Positive for weakness.       Physical Exam:  Physical Exam   Constitutional: No distress.   Cardiovascular: Normal rate and regular rhythm.    Pulmonary/Chest: Effort normal and breath sounds normal. No respiratory distress.   Abdominal: Soft. Bowel sounds are normal. He exhibits no distension. There is no tenderness.       Labs:  Recent Labs      09/22/18   1658   [id number]  7.45   [id number]  38.3*   [id number]  46.6*   [id number]  82.8*   ARTHCO3  26*   ARTBE  2         Recent Labs      09/22/18   1710  09/22/18 2009 09/23/18   0306  09/23/18   2241  09/24/18   0502   SODIUM   --   136  139   --   139   POTASSIUM   --   4.1  3.9   --   4.0   CHLORIDE   --   103  106   --   105   CO2   --   25  25   --   27   BUN   --   13  13   --   15   CREATININE   --   0.95  0.90   --   0.82   MAGNESIUM  2.2   --    --   2.6*   --    PHOSPHORUS   --    --    --   2.4*   --    CALCIUM   --   8.1*  8.6   --   9.2     Recent Labs      09/22/18   0853  09/22/18 2009 09/23/18   0306  09/24/18   0502   ALTSGPT  12  11  10   --    ASTSGOT  22  26  20   --    ALKPHOSPHAT  74  55  56   --    TBILIRUBIN  1.3  1.0  1.0   --    LIPASE  11   --    --    --    GLUCOSE  131*  100*  108*  148*     Recent Labs       18   0853  18   0306  18   0502   RBC  5.64  4.49*  4.64*   HEMOGLOBIN  17.2  13.9*  14.2   HEMATOCRIT  47.3  37.6*  39.2*   PLATELETCT  199  174  207     Recent Labs      18   0853  18   0306  18   0502   WBC  9.2   --   10.5  29.0*   NEUTSPOLYS  68.10   --   75.90*  79.80*   LYMPHOCYTES  12.40*   --   14.60*  8.80*   MONOCYTES  19.50*   --   4.30  5.30   EOSINOPHILS  0.00   --   0.00  0.00   BASOPHILS  0.00   --   0.00  0.00   ASTSGOT  22  26  20   --    ALTSGPT  12  11  10   --    ALKPHOSPHAT  74  55  56   --    TBILIRUBIN  1.3  1.0  1.0   --      Hemodynamics:  Temp (24hrs), Av.9 °C (98.5 °F), Min:36.7 °C (98.1 °F), Max:37.1 °C (98.8 °F)  Temperature: 36.7 °C (98.1 °F)  Pulse  Av.6  Min: 51  Max: 143Heart Rate (Monitored): (!) 54  NIBP: 113/76     Respiratory:    Respiration: (!) 27, Pulse Oximetry: 97 %, O2 Daily Delivery Respiratory : Room Air with O2 Available     Work Of Breathing / Effort: Moderate  RUL Breath Sounds: Clear, RML Breath Sounds: Diminished, RLL Breath Sounds: Diminished, PIETER Breath Sounds: Clear, LLL Breath Sounds: Diminished  Fluids:    Intake/Output Summary (Last 24 hours) at 18 1404  Last data filed at 18 0800   Gross per 24 hour   Intake          3276.67 ml   Output             1450 ml   Net          1826.67 ml        GI/Nutrition:  Orders Placed This Encounter   Procedures   • Diet Order Full Liquid     Standing Status:   Standing     Number of Occurrences:   1     Order Specific Question:   Diet:     Answer:   Full Liquid [11]     Medical Decision Making, by Problem:  Active Hospital Problems    Diagnosis   • Esophageal rupture [K22.3]   • Tobacco abuse [Z72.0]   • Acute respiratory failure with hypoxia (HCC) [J96.01]   • Pneumonia [J18.9]   • Pneumomediastinum (HCC) [J98.2]   • N&V (nausea and vomiting) [R11.2]   • Sepsis ([facility]) [A41.9]   • Tachycardia [R00.0]       Plan:  Suspected esophageal tear with  pneumomediastinum - strep pneumoniae bacteremia. Breathing and chest pain are improving. Still significant SIMPSON. Needs PT. Ok to advance to full liquids then soft diet only. Monitor leukocytosis. If new fevers consider repeat CT chest to r/o mediastinal abscess    Quality-Core Measures

## 2018-09-24 NOTE — CARE PLAN
Problem: Safety  Goal: Will remain free from injury    Intervention: Provide assistance with mobility   09/23/18 2200   OTHER   Assistance / Tolerance Standby Assist     Assistance with disconnected cords, otherwise independent in mobilizing.       Problem: Infection  Goal: Will remain free from infection    Intervention: Implement standard precautions and perform hand washing before and after patient contact  Standard precautions and hand hygiene implemented before and after patient contact.

## 2018-09-24 NOTE — PROGRESS NOTES
Monitor Summary: .16/.08/.42. Rhythm: SR-sinus arrhythmia . Rate: 47-low 90's.     12 hour chart check.     2 rn skin check.

## 2018-09-24 NOTE — PROGRESS NOTES
"Dr. Daniels paged regarding rhythm/rate change. SR 70's-90's to what the STAT EKG confirmed as sinus arrhythmia high 40's-70's. SBP 90's-110's. Denies pain. States it's hard to \"catch my breath.\" 15 L oxymask, RR 21-28, spO2 %, mild-moderate WOB. Orders to obtain Mg and Phos.        "

## 2018-09-24 NOTE — PROGRESS NOTES
2 RN assessment completed with TESSY Rodriguez. Skin intact. Grey ear foam pads in place. Pillows in use for support/positioning.

## 2018-09-24 NOTE — PROGRESS NOTES
Pt arrived to unit as transfer. Two RN skin check performed; skin clean, dry, and intact. No breakdown noted. No complaints of pain. Call light in reach. Fall precautions in place. Will continue hourly rounding.

## 2018-09-24 NOTE — PROGRESS NOTES
Critical Care Progress Note    Date of admission  9/22/2018    Chief Complaint  27 y.o. male admitted 9/22/2018 with pneumomediastinum multifocal pneumonia and esophageal rupture    Hospital Course  Gabriel Lemos is a 27 y.o. male with no significant past medical history presented to the hospital for evaluation of worsening shortness of breath accompanied by chills, fevers, malaise, blood-tinged productive cough, nausea and vomiting, CT chest revealed bilateral pulmonary infiltrative process with pneumomediastinum.  He is admitted to ICU for close monitoring of respiratory status in lieu of severe community-acquired pneumonia, therefore ICU consult. Taken from Za note.     Interval Problem Update  Reviewed last 24 hour events:   - O2 requirements remain the same   - NSR, -130   - AF, WBC up significantly, bands down   - AAox4   - ongoing pain in lower back   - electrolytes in goal   - CLD   - good UOP on NS @ 75   - day 3 unasyn, deescalated recently    Review of Systems  Review of Systems   Constitutional: Positive for fatigue. Negative for chills and fever.   HENT: Negative for congestion, sore throat and trouble swallowing.    Eyes: Negative for pain and itching.   Respiratory: Positive for cough and shortness of breath. Negative for chest tightness, wheezing and stridor.    Cardiovascular: Positive for chest pain. Negative for palpitations and leg swelling.   Gastrointestinal: Negative for abdominal pain and nausea.   Genitourinary: Negative for difficulty urinating.   Musculoskeletal: Positive for back pain.   Skin: Negative for rash.   Allergic/Immunologic: Negative for immunocompromised state.   Neurological: Negative for dizziness and speech difficulty.   Hematological: Does not bruise/bleed easily.   Psychiatric/Behavioral: Positive for sleep disturbance. Negative for confusion. The patient is not nervous/anxious.    All other systems reviewed and are negative.       Vital Signs for last 24  hours   Temp:  [36.8 °C (98.2 °F)-37.1 °C (98.8 °F)] 36.9 °C (98.5 °F)  Pulse:  [51-96] 78  Resp:  [19-50] 26    Hemodynamic parameters for last 24 hours       Vent Settings for last 24 hours   15 lpm FM, IS 1000mL    Physical Exam   Physical Exam   Constitutional: He is oriented to person, place, and time. He appears well-developed and well-nourished. No distress.   HENT:   Head: Normocephalic and atraumatic.   Mouth/Throat: Oropharynx is clear and moist. No oropharyngeal exudate.   Eyes: Pupils are equal, round, and reactive to light. Conjunctivae are normal. No scleral icterus.   Neck: Neck supple. No JVD present.   Cardiovascular: Normal rate, regular rhythm, normal heart sounds and intact distal pulses.    No murmur heard.  Pulmonary/Chest: Effort normal. No accessory muscle usage. No respiratory distress. He has decreased breath sounds in the left upper field. He has no wheezes. He has rhonchi in the left upper field and the left middle field. He has no rales.   Abdominal: Soft. Bowel sounds are normal. He exhibits no distension. There is no tenderness.   Musculoskeletal: He exhibits no edema or tenderness.   Lymphadenopathy:     He has no cervical adenopathy.   Neurological: He is alert and oriented to person, place, and time. No cranial nerve deficit. He exhibits normal muscle tone.   Skin: Skin is warm and dry. No erythema.   Psychiatric: He has a normal mood and affect. His behavior is normal.   Nursing note and vitals reviewed.      Medications  Current Facility-Administered Medications   Medication Dose Route Frequency Provider Last Rate Last Dose   • SODIUM CHLORIDE 0.9 % IV SOLN            • senna-docusate (PERICOLACE or SENOKOT S) 8.6-50 MG per tablet 2 Tab  2 Tab Oral BID Carrington Harden M.D.   Stopped at 09/23/18 0600    And   • polyethylene glycol/lytes (MIRALAX) PACKET 1 Packet  1 Packet Oral QDAY PRN Carrington Harden M.D.        And   • magnesium hydroxide (MILK OF MAGNESIA) suspension 30  mL  30 mL Oral QDAY PRBEENA Harden M.D.        And   • bisacodyl (DULCOLAX) suppository 10 mg  10 mg Rectal QDAY PRBEENA Harden M.D.       • Respiratory Care per Protocol   Nebulization Continuous RT Carrington Harden M.D.       • 0.9 % NaCl with KCl 20 mEq infusion   Intravenous Continuous Riki Osorio M.D. 75 mL/hr at 09/23/18 1713     • acetaminophen (TYLENOL) tablet 650 mg  650 mg Oral Q6HRS PRBEENA Harden M.D.   650 mg at 09/24/18 0714   • Pharmacy Consult Request ...Pain Management Review   Other PRBEENA Harden M.D.        And   • oxyCODONE immediate-release (ROXICODONE) tablet 2.5 mg  2.5 mg Oral Q3HRS PRBEENA Harden M.D.   2.5 mg at 09/23/18 1951    And   • oxyCODONE immediate-release (ROXICODONE) tablet 5 mg  5 mg Oral Q3HRS PRBEENA Harden M.D.   5 mg at 09/22/18 2051    And   • morphine (pf) 4 mg/ml injection 2 mg  2 mg Intravenous Q3HRS PRBEENA Harden M.D.   2 mg at 09/23/18 1008   • ondansetron (ZOFRAN) syringe/vial injection 4 mg  4 mg Intravenous Q4HRS PRBEENA Harden M.D.       • ondansetron (ZOFRAN ODT) dispertab 4 mg  4 mg Oral Q4HRS PRBEENA Harden M.D.       • promethazine (PHENERGAN) tablet 12.5-25 mg  12.5-25 mg Oral Q4HRS PRBEENA Harden M.D.       • promethazine (PHENERGAN) suppository 12.5-25 mg  12.5-25 mg Rectal Q4HRS PRBEENA Harden M.D.       • prochlorperazine (COMPAZINE) injection 5-10 mg  5-10 mg Intravenous Q4HRS PRBEENA Harden M.D.       • famotidine (PEPCID) tablet 20 mg  20 mg Oral BID Carrington Harden M.D.   20 mg at 09/24/18 0509    Or   • famotidine (PEPCID) injection 20 mg  20 mg Intravenous BID Carrington Harden M.D.   20 mg at 09/23/18 0434   • ampicillin/sulbactam (UNASYN) 3 g in  mL IVPB  3 g Intravenous Q6HRS Carrington Harden M.D.   Stopped at 09/24/18 0538   • nicotine (NICODERM) 7 MG/24HR 7 mg  7 mg Transdermal Daily-0600 Riki Osorio M.D.   7  mg at 09/24/18 0508   • heparin injection 5,000 Units  5,000 Units Subcutaneous Q8HRS Riki Osorio M.D.   5,000 Units at 09/24/18 0508       Fluids    Intake/Output Summary (Last 24 hours) at 09/24/18 0744  Last data filed at 09/24/18 0600   Gross per 24 hour   Intake          3176.67 ml   Output             1500 ml   Net          1676.67 ml       Laboratory  Recent Results (from the past 48 hour(s))   CBC WITH DIFFERENTIAL    Collection Time: 09/22/18  8:53 AM   Result Value Ref Range    WBC 9.2 4.8 - 10.8 K/uL    RBC 5.64 4.70 - 6.10 M/uL    Hemoglobin 17.2 14.0 - 18.0 g/dL    Hematocrit 47.3 42.0 - 52.0 %    MCV 83.9 81.4 - 97.8 fL    MCH 30.5 27.0 - 33.0 pg    MCHC 36.4 (H) 33.7 - 35.3 g/dL    RDW 35.1 (L) 35.9 - 50.0 fL    Platelet Count 199 164 - 446 K/uL    MPV 11.2 9.0 - 12.9 fL    Nucleated RBC 0.00 /100 WBC    NRBC (Absolute) 0.00 K/uL    Neutrophils-Polys 68.10 44.00 - 72.00 %    Lymphocytes 12.40 (L) 22.00 - 41.00 %    Monocytes 19.50 (H) 0.00 - 13.40 %    Eosinophils 0.00 0.00 - 6.90 %    Basophils 0.00 0.00 - 1.80 %    Neutrophils (Absolute) 6.27 1.82 - 7.42 K/uL    Lymphs (Absolute) 1.14 1.00 - 4.80 K/uL    Monos (Absolute) 1.79 (H) 0.00 - 0.85 K/uL    Eos (Absolute) 0.00 0.00 - 0.51 K/uL    Baso (Absolute) 0.00 0.00 - 0.12 K/uL    Anisocytosis 1+    COMP METABOLIC PANEL    Collection Time: 09/22/18  8:53 AM   Result Value Ref Range    Sodium 133 (L) 135 - 145 mmol/L    Potassium 3.5 (L) 3.6 - 5.5 mmol/L    Chloride 96 96 - 112 mmol/L    Co2 28 20 - 33 mmol/L    Anion Gap 9.0 0.0 - 11.9    Glucose 131 (H) 65 - 99 mg/dL    Bun 15 8 - 22 mg/dL    Creatinine 1.06 0.50 - 1.40 mg/dL    Calcium 9.3 8.5 - 10.5 mg/dL    AST(SGOT) 22 12 - 45 U/L    ALT(SGPT) 12 2 - 50 U/L    Alkaline Phosphatase 74 30 - 99 U/L    Total Bilirubin 1.3 0.1 - 1.5 mg/dL    Albumin 3.7 3.2 - 4.9 g/dL    Total Protein 7.2 6.0 - 8.2 g/dL    Globulin 3.5 1.9 - 3.5 g/dL    A-G Ratio 1.1 g/dL   LIPASE    Collection Time: 09/22/18   8:53 AM   Result Value Ref Range    Lipase 11 11 - 82 U/L   ESTIMATED GFR    Collection Time: 09/22/18  8:53 AM   Result Value Ref Range    GFR If African American >60 >60 mL/min/1.73 m 2    GFR If Non African American >60 >60 mL/min/1.73 m 2   DIFFERENTIAL MANUAL    Collection Time: 09/22/18  8:53 AM   Result Value Ref Range    Manual Diff Status PERFORMED    PERIPHERAL SMEAR REVIEW    Collection Time: 09/22/18  8:53 AM   Result Value Ref Range    Peripheral Smear Review see below    PLATELET ESTIMATE    Collection Time: 09/22/18  8:53 AM   Result Value Ref Range    Plt Estimation Normal    MORPHOLOGY    Collection Time: 09/22/18  8:53 AM   Result Value Ref Range    RBC Morphology Present     Ovalocytes 1+     Dohle Bodies Few    URINALYSIS CULTURE, IF INDICATED    Collection Time: 09/22/18  9:45 AM   Result Value Ref Range    Color Yellow     Character Clear     Specific Gravity 1.010 <1.035    Ph 6.0 5.0 - 8.0    Glucose Negative Negative mg/dL    Ketones Negative Negative mg/dL    Protein 30 (A) Negative mg/dL    Bilirubin Negative Negative    Urobilinogen, Urine 1.0 Negative    Nitrite Negative Negative    Leukocyte Esterase Negative Negative    Occult Blood Small (A) Negative    Micro Urine Req Microscopic    URINE MICROSCOPIC (W/UA)    Collection Time: 09/22/18  9:45 AM   Result Value Ref Range    WBC 0-2 (A) /hpf    RBC 2-5 (A) /hpf    Bacteria Negative None /hpf    Epithelial Cells Negative /hpf    Hyaline Cast 0-2 /lpf   BLOOD CULTURE    Collection Time: 09/22/18  2:07 PM   Result Value Ref Range    Significant Indicator POS (POS)     Source BLD     Site PERIPHERAL     Blood Culture (A)      Growth detected by Bactec instrument. 09/23/2018  08:54  Gram Stain: Gram positive cocci: Possible Streptococcus sp.     BLOOD CULTURE    Collection Time: 09/22/18  2:07 PM   Result Value Ref Range    Significant Indicator POS (POS)     Source BLD     Site PERIPHERAL     Blood Culture (A)      Growth detected by Bactec  instrument. 09/23/2018  08:57  Gram Stain: Gram positive cocci: Possible Streptococcus sp.     LACTIC ACID    Collection Time: 09/22/18  2:07 PM   Result Value Ref Range    Lactic Acid 2.1 (H) 0.5 - 2.0 mmol/L   GRAM STAIN    Collection Time: 09/22/18  4:05 PM   Result Value Ref Range    Significant Indicator .     Source RESP     Site SPUTUM     Gram Stain Result       Sputum Gram stain quality score is <1, probable  oropharyngeal contamination. Culture not performed.  Recollect if clinically indicated.     PEDIATRIC RESPIRATORY PANEL BY PCR    Collection Time: 09/22/18  4:20 PM   Result Value Ref Range    Adenovirus, PCR Not Detected     Coronavirus, PCR Not Detected     Human Metapneumovirus, PCR Not Detected     Rhinovirus / Enterovirus, PCR Not Detected     Influenza virus A RNA Not Detected     Influenza virus A H1 RNA Not Detected     Influenza A 2009, H1N1, PCR Not Detected     Influenza virus A H3 RNA Not Detected     Influenza virus B, PCR Not Detected     Parainfluenza virus 1, PCR Not Detected     Parainfluenza virus 2, PCR Not Detected     Parainfluenza virus 3, PCR Not Detected     Parainfluenza 4, PCR Not Detected     Resp Syncytial Virus A, PCR Not Detected     Resp Syncytial Virus B, PCR Not Detected     Chlamydia pneumoniae, PCR Not Detected     Mycoplasma pneumoniae, PCR Not Detected    ABG - LAB    Collection Time: 09/22/18  4:58 PM   Result Value Ref Range    Ph 7.45 7.40 - 7.50    Pco2 38.3 (H) 26.0 - 37.0 mmHg    Po2 46.6 (LL) 64.0 - 87.0 mmHg    O2 Saturation 82.8 (L) 93.0 - 99.0 %    Hco3 26 (H) 17 - 25 mmol/L    Base Excess 2 -4 - 3 mmol/L    Body Temp see below Centigrade   Procalcitonin    Collection Time: 09/22/18  5:10 PM   Result Value Ref Range    Procalcitonin 31.60 (H) <0.25 ng/mL   TSH (Thyroid Stimulating Hormone)    Collection Time: 09/22/18  5:10 PM   Result Value Ref Range    TSH 0.340 (L) 0.380 - 5.330 uIU/mL   Magnesium    Collection Time: 09/22/18  5:10 PM   Result Value Ref  Range    Magnesium 2.2 1.5 - 2.5 mg/dL   EKG    Collection Time: 18  7:06 PM   Result Value Ref Range    Report       [facility]    Test Date:  2018  Pt Name:    MARIA LUZ TAYLOR                 Department: ER  MRN:        [id number]                      Room:       [facility]  Gender:     Male                         Technician: ABNER  :        1990                   Requested By:JONES CASEY  Order #:    [id number]                    Reading MD: Eduard Neri MD    Measurements  Intervals                                Axis  Rate:       121                          P:          49  TX:         150                          QRS:        -26  QRSD:       87                           T:          46  QT:         304  QTc:        432    Interpretive Statements  SINUS TACHYCARDIA  LEFT AXIS DEVIATION  No previous ECG available for comparison    Electronically Signed On 2018 14:42:58 PDT by Eduard Neri MD     COMP METABOLIC PANEL    Collection Time: 18  8:09 PM   Result Value Ref Range    Sodium 136 135 - 145 mmol/L    Potassium 4.1 3.6 - 5.5 mmol/L    Chloride 103 96 - 112 mmol/L    Co2 25 20 - 33 mmol/L    Anion Gap 8.0 0.0 - 11.9    Glucose 100 (H) 65 - 99 mg/dL    Bun 13 8 - 22 mg/dL    Creatinine 0.95 0.50 - 1.40 mg/dL    Calcium 8.1 (L) 8.5 - 10.5 mg/dL    AST(SGOT) 26 12 - 45 U/L    ALT(SGPT) 11 2 - 50 U/L    Alkaline Phosphatase 55 30 - 99 U/L    Total Bilirubin 1.0 0.1 - 1.5 mg/dL    Albumin 2.8 (L) 3.2 - 4.9 g/dL    Total Protein 5.6 (L) 6.0 - 8.2 g/dL    Globulin 2.8 1.9 - 3.5 g/dL    A-G Ratio 1.0 g/dL   ESTIMATED GFR    Collection Time: 18  8:09 PM   Result Value Ref Range    GFR If African American >60 >60 mL/min/1.73 m 2    GFR If Non African American >60 >60 mL/min/1.73 m 2   T3 FREE    Collection Time: 18  3:06 AM   Result Value Ref Range    T3,Free 2.52 2.40 - 4.20 pg/mL   FREE THYROXINE    Collection Time: 18  3:06 AM   Result Value Ref Range    Free T-4  1.40 0.53 - 1.43 ng/dL   CBC with Differential    Collection Time: 09/23/18  3:06 AM   Result Value Ref Range    WBC 10.5 4.8 - 10.8 K/uL    RBC 4.49 (L) 4.70 - 6.10 M/uL    Hemoglobin 13.9 (L) 14.0 - 18.0 g/dL    Hematocrit 37.6 (L) 42.0 - 52.0 %    MCV 83.7 81.4 - 97.8 fL    MCH 31.0 27.0 - 33.0 pg    MCHC 37.0 (H) 33.7 - 35.3 g/dL    RDW 36.4 35.9 - 50.0 fL    Platelet Count 174 164 - 446 K/uL    MPV 10.9 9.0 - 12.9 fL    Nucleated RBC 0.00 /100 WBC    NRBC (Absolute) 0.00 K/uL    Neutrophils-Polys 75.90 (H) 44.00 - 72.00 %    Lymphocytes 14.60 (L) 22.00 - 41.00 %    Monocytes 4.30 0.00 - 13.40 %    Eosinophils 0.00 0.00 - 6.90 %    Basophils 0.00 0.00 - 1.80 %    Neutrophils (Absolute) 8.42 (H) 1.82 - 7.42 K/uL    Lymphs (Absolute) 1.53 1.00 - 4.80 K/uL    Monos (Absolute) 0.45 0.00 - 0.85 K/uL    Eos (Absolute) 0.00 0.00 - 0.51 K/uL    Baso (Absolute) 0.00 0.00 - 0.12 K/uL    Anisocytosis 1+     Macrocytosis 1+    Comp Metabolic Panel (CMP)    Collection Time: 09/23/18  3:06 AM   Result Value Ref Range    Sodium 139 135 - 145 mmol/L    Potassium 3.9 3.6 - 5.5 mmol/L    Chloride 106 96 - 112 mmol/L    Co2 25 20 - 33 mmol/L    Anion Gap 8.0 0.0 - 11.9    Glucose 108 (H) 65 - 99 mg/dL    Bun 13 8 - 22 mg/dL    Creatinine 0.90 0.50 - 1.40 mg/dL    Calcium 8.6 8.5 - 10.5 mg/dL    AST(SGOT) 20 12 - 45 U/L    ALT(SGPT) 10 2 - 50 U/L    Alkaline Phosphatase 56 30 - 99 U/L    Total Bilirubin 1.0 0.1 - 1.5 mg/dL    Albumin 2.9 (L) 3.2 - 4.9 g/dL    Total Protein 5.7 (L) 6.0 - 8.2 g/dL    Globulin 2.8 1.9 - 3.5 g/dL    A-G Ratio 1.0 g/dL   ESTIMATED GFR    Collection Time: 09/23/18  3:06 AM   Result Value Ref Range    GFR If African American >60 >60 mL/min/1.73 m 2    GFR If Non African American >60 >60 mL/min/1.73 m 2   PLATELET ESTIMATE    Collection Time: 09/23/18  3:06 AM   Result Value Ref Range    Plt Estimation Normal    MORPHOLOGY    Collection Time: 09/23/18  3:06 AM   Result Value Ref Range    RBC Morphology  Present     Poikilocytosis 1+    PERIPHERAL SMEAR REVIEW    Collection Time: 18  3:06 AM   Result Value Ref Range    Peripheral Smear Review see below    DIFFERENTIAL MANUAL    Collection Time: 18  3:06 AM   Result Value Ref Range    Bands-Stabs 4.30 0.00 - 10.00 %    Progranulocytes 0.90 %    Manual Diff Status PERFORMED    MRSA BY PCR (AMP)    Collection Time: 18 10:04 AM   Result Value Ref Range    Significant Indicator NEG     Source RESP     Site NARES     MRSA PCR Negative for MRSA by PCR.    EKG    Collection Time: 18 10:13 PM   Result Value Ref Range    Report       [facility]    Test Date:  2018  Pt Name:    MARIA LUZ TAYLOR                 Department: [facility]  MRN:        [id number]                      Room:       [facility]  Gender:     Male                         Technician: FALLON  :        1990                   Requested By:AG WALTERS  Order #:    [id number]                    Reading MD: Jeni Payne MD    Measurements  Intervals                                Axis  Rate:       56                           P:          13  NH:         156                          QRS:        -22  QRSD:       98                           T:          0  QT:         408  QTc:        394    Interpretive Statements  SINUS BRADYCARDIA  BORDERLINE T ABNORMALITIES, INFERIOR LEADS  Compared to ECG 2018 19:06:10  T-wave abnormality now present  Sinus tachycardia no longer present  Electronically Signed On 2018 2:10:06 PDT by Jeni Payne MD     MAGNESIUM    Collection Time: 18 10:41 PM   Result Value Ref Range    Magnesium 2.6 (H) 1.5 - 2.5 mg/dL   PHOSPHORUS    Collection Time: 18 10:41 PM   Result Value Ref Range    Phosphorus 2.4 (L) 2.5 - 4.5 mg/dL   BASIC METABOLIC PANEL    Collection Time: 18  5:02 AM   Result Value Ref Range    Sodium 139 135 - 145 mmol/L    Potassium 4.0 3.6 - 5.5 mmol/L    Chloride 105 96 - 112 mmol/L    Co2 27 20 - 33 mmol/L    Glucose 148  (H) 65 - 99 mg/dL    Bun 15 8 - 22 mg/dL    Creatinine 0.82 0.50 - 1.40 mg/dL    Calcium 9.2 8.5 - 10.5 mg/dL    Anion Gap 7.0 0.0 - 11.9   CBC WITH DIFFERENTIAL    Collection Time: 09/24/18  5:02 AM   Result Value Ref Range    WBC 29.0 (H) 4.8 - 10.8 K/uL    RBC 4.64 (L) 4.70 - 6.10 M/uL    Hemoglobin 14.2 14.0 - 18.0 g/dL    Hematocrit 39.2 (L) 42.0 - 52.0 %    MCV 84.5 81.4 - 97.8 fL    MCH 30.6 27.0 - 33.0 pg    MCHC 36.2 (H) 33.7 - 35.3 g/dL    RDW 38.6 35.9 - 50.0 fL    Platelet Count 207 164 - 446 K/uL    MPV 10.8 9.0 - 12.9 fL    Nucleated RBC 0.00 /100 WBC    NRBC (Absolute) 0.00 K/uL    Neutrophils-Polys 79.80 (H) 44.00 - 72.00 %    Lymphocytes 8.80 (L) 22.00 - 41.00 %    Monocytes 5.30 0.00 - 13.40 %    Eosinophils 0.00 0.00 - 6.90 %    Basophils 0.00 0.00 - 1.80 %    Neutrophils (Absolute) 23.64 (H) 1.82 - 7.42 K/uL    Lymphs (Absolute) 2.55 1.00 - 4.80 K/uL    Monos (Absolute) 1.54 (H) 0.00 - 0.85 K/uL    Eos (Absolute) 0.00 0.00 - 0.51 K/uL    Baso (Absolute) 0.00 0.00 - 0.12 K/uL    Anisocytosis 1+     Macrocytosis 1+    ESTIMATED GFR    Collection Time: 09/24/18  5:02 AM   Result Value Ref Range    GFR If African American >60 >60 mL/min/1.73 m 2    GFR If Non African American >60 >60 mL/min/1.73 m 2   DIFFERENTIAL MANUAL    Collection Time: 09/24/18  5:02 AM   Result Value Ref Range    Bands-Stabs 1.70 0.00 - 10.00 %    Metamyelocytes 1.80 %    Myelocytes 2.60 %    Manual Diff Status PERFORMED    PERIPHERAL SMEAR REVIEW    Collection Time: 09/24/18  5:02 AM   Result Value Ref Range    Peripheral Smear Review see below    PLATELET ESTIMATE    Collection Time: 09/24/18  5:02 AM   Result Value Ref Range    Plt Estimation Normal    MORPHOLOGY    Collection Time: 09/24/18  5:02 AM   Result Value Ref Range    RBC Morphology Present        Imaging  X-Ray:  I have personally reviewed the images and compared with prior images. and My impression is: Unchanged left upper lobe consolidation with retrocardiac  opacification, no tubes nor lines, resolved pneumopericardium    Assessment/Plan  Acute respiratory failure with hypoxia (HCC)   Assessment & Plan    Improving  Cont RT/O2 protocols  Wean off FM today to keep SaO2>90%  Encourage IS, OOB to chair        Tobacco abuse   Assessment & Plan    Nicotine patch  Tobacco cessation education provided        Esophageal rupture   Assessment & Plan    Supportive care for now  PPI  Diet advancement per GI          Sepsis (HCC)   Assessment & Plan    S/p sepsis protocols  Improving  Cont abx        N&V (nausea and vomiting)   Assessment & Plan    Improving  Cont anti-emetics prn        Pneumomediastinum (HCC)   Assessment & Plan    Cont supplemental O2  Avoid PEP, encourage IS  Daily CXR to monitor  Possible bronch evaluation if persists/worsens        Pneumonia- (present on admission)   Assessment & Plan    Cont abx for 7-10 days  F/u final cultures  Consider bronch if persists               VTE:  Heparin  Ulcer: PPI  Lines: None    I have performed a physical exam and reviewed and updated ROS and Plan today (9/24/2018). In review of yesterday's note (9/23/2018), there are no changes except as documented above.  Patient remains critically ill today requiring my active management of his hypoxic respiratory failure including titration of supplemental oxygen, antibiotic adjustment, monitoring fluid status to prevent fluid overload.    Discussed patient condition and risk of morbidity and/or mortality with Hospitalist, Family, RN, RT, Charge nurse / hot rounds, Patient and GI.  Critical care time: 31 minutes.  No time overlap.  Procedures are not included in this time.

## 2018-09-24 NOTE — PROGRESS NOTES
Mg 2.6, Phos 2.4. Sinus arrhythmia 47-70's. SBP low 90's. 15 L Oxymask, spO2 100%, RR 21. Denies pain. Dr. Osorio updated. No new orders.

## 2018-09-24 NOTE — PROGRESS NOTES
[facility] Hospitalist Progress Note    Date of Service: 2018    Chief Complaint  27 y.o. male admitted 2018 with PNA and pneumomediastinum    Interval Problem Update    SB-SR  SBP 's  On clear liquid diet  WBC 29K  IS 1000  Weaned off oxygen  On unasyn     Consultants/Specialty  Critical care  GI  ID    Disposition  medical        Review of Systems   Constitutional: Positive for malaise/fatigue. Negative for chills and fever.   HENT: Negative.    Eyes: Negative for discharge and redness.   Respiratory: Positive for cough and sputum production. Negative for hemoptysis, shortness of breath and wheezing.    Cardiovascular: Negative for chest pain, palpitations, orthopnea and leg swelling.   Gastrointestinal: Negative for abdominal pain, diarrhea, melena, nausea and vomiting.   Genitourinary: Negative for dysuria, flank pain, hematuria and urgency.   Musculoskeletal: Negative for back pain, falls, joint pain and neck pain.   Skin: Negative.    Neurological: Negative for speech change, focal weakness, seizures, loss of consciousness and headaches.   Psychiatric/Behavioral: Negative for hallucinations, memory loss and substance abuse. The patient is not nervous/anxious.    All other systems reviewed and are negative.     Physical Exam  Laboratory/Imaging   Hemodynamics  Temp (24hrs), Av °C (98.6 °F), Min:36.9 °C (98.4 °F), Max:37.1 °C (98.8 °F)   Temperature: 36.9 °C (98.5 °F)  Pulse  Av.3  Min: 51  Max: 143 Heart Rate (Monitored): 84  NIBP: 113/78      Respiratory      Respiration: (!) 26, Pulse Oximetry: 100 %, O2 Daily Delivery Respiratory : OxyMask     Work Of Breathing / Effort: Moderate  RUL Breath Sounds: Clear, RML Breath Sounds: Diminished, RLL Breath Sounds: Diminished, PIETER Breath Sounds: Clear, LLL Breath Sounds: Diminished    Fluids    Intake/Output Summary (Last 24 hours) at 18 0859  Last data filed at 18 0600   Gross per 24 hour   Intake          3026.67 ml   Output              1500 ml   Net          1526.67 ml       Nutrition  Orders Placed This Encounter   Procedures   • Diet Order Clear Liquids - No Red Foods     Standing Status:   Standing     Number of Occurrences:   1     Order Specific Question:   Diet:     Answer:   Clear Liquids - No Red Foods [12]     Physical Exam   Constitutional: He is oriented to person, place, and time. He appears well-developed and well-nourished.   HENT:   Head: Normocephalic and atraumatic.   Right Ear: External ear normal.   Left Ear: External ear normal.   Mouth/Throat: No oropharyngeal exudate.   Eyes: Pupils are equal, round, and reactive to light. Conjunctivae are normal. Right eye exhibits no discharge. Left eye exhibits no discharge. No scleral icterus.   Neck: Neck supple. No JVD present. No tracheal deviation present.   Cardiovascular: Normal rate and regular rhythm.  Exam reveals no gallop and no friction rub.    No murmur heard.  Pulmonary/Chest: Effort normal. No respiratory distress. He has rales. He exhibits no tenderness.   Abdominal: Soft. Bowel sounds are normal. He exhibits no distension. There is no tenderness. There is no rebound.   Musculoskeletal: He exhibits no edema or tenderness.   Neurological: He is alert and oriented to person, place, and time. No cranial nerve deficit. He exhibits normal muscle tone.   Skin: Skin is warm and dry. No rash noted. He is not diaphoretic. No cyanosis or erythema. Nails show no clubbing.   Psychiatric: He has a normal mood and affect. His behavior is normal.   Nursing note and vitals reviewed.      Recent Labs      09/22/18   0853  09/23/18   0306  09/24/18   0502   WBC  9.2  10.5  29.0*   RBC  5.64  4.49*  4.64*   HEMOGLOBIN  17.2  13.9*  14.2   HEMATOCRIT  47.3  37.6*  39.2*   MCV  83.9  83.7  84.5   MCH  30.5  31.0  30.6   MCHC  36.4*  37.0*  36.2*   RDW  35.1*  36.4  38.6   PLATELETCT  199  174  207   MPV  11.2  10.9  10.8     Recent Labs      09/22/18 2009 09/23/18   0306  09/24/18   0502    SODIUM  136  139  139   POTASSIUM  4.1  3.9  4.0   CHLORIDE  103  106  105   CO2  25  25  27   GLUCOSE  100*  108*  148*   BUN  13  13  15   CREATININE  0.95  0.90  0.82   CALCIUM  8.1*  8.6  9.2                      Assessment/Plan     Acute respiratory failure with hypoxia (HCC)   Assessment & Plan    Resolved  Continue RT protocol and treatment for pneumonia        Esophageal rupture   Assessment & Plan    Likely microperforation secondary to nausea and vomiting with pneumomediastinum    Continue Pepcid  Advance diet if okay with GI            Sepsis (HCC)   Assessment & Plan    This is severe sepsis with the following associated acute organ dysfunction(s): acute respiratory failure.     resolved        N&V (nausea and vomiting)   Assessment & Plan    resolved        Pneumomediastinum (HCC)   Assessment & Plan    Clinically stable continue to monitor        Pneumonia- (present on admission)   Assessment & Plan    Blood cultures growing strep pneumonia     Discussed with Dr Paez change to IV ceftriaxone          Quality-Core Measures   Reviewed items::  Labs reviewed, Medications reviewed and Radiology images reviewed  Jimenez catheter::  No Jimenez  DVT prophylaxis pharmacological::  Heparin  Ulcer Prophylaxis::  Yes  Antibiotics:  Treating active infection/contamination beyond 24 hours perioperative coverage      Discussed with Dr. Jean-Baptiste patient established with [facility] family as outpatient they will take over his care starting tomorrow    Plan of care reviewed with patient and discussed with nursing staff and Dr Gonda and ID

## 2018-09-24 NOTE — PROGRESS NOTES
Donna from lab called to inform of Positive blood culture, Streptococcus pneumoniae. Dr. Lucila Antony informed. No new orders placed.

## 2018-09-25 ENCOUNTER — APPOINTMENT (OUTPATIENT)
Dept: RADIOLOGY | Facility: MEDICAL CENTER | Age: 28
DRG: 871 | End: 2018-09-25
Attending: INTERNAL MEDICINE
Payer: COMMERCIAL

## 2018-09-25 LAB
BACTERIA BLD CULT: ABNORMAL
BASOPHILS # BLD AUTO: 0 % (ref 0–1.8)
BASOPHILS # BLD: 0 K/UL (ref 0–0.12)
EOSINOPHIL # BLD AUTO: 0 K/UL (ref 0–0.51)
EOSINOPHIL NFR BLD: 0 % (ref 0–6.9)
ERYTHROCYTE [DISTWIDTH] IN BLOOD BY AUTOMATED COUNT: 39.9 FL (ref 35.9–50)
HCT VFR BLD AUTO: 32.4 % (ref 42–52)
HGB BLD-MCNC: 11.7 G/DL (ref 14–18)
HIV 1+2 AB+HIV1 P24 AG SERPL QL IA: NON REACTIVE
LYMPHOCYTES # BLD AUTO: 2.18 K/UL (ref 1–4.8)
LYMPHOCYTES NFR BLD: 9 % (ref 22–41)
MANUAL DIFF BLD: NORMAL
MCH RBC QN AUTO: 30.9 PG (ref 27–33)
MCHC RBC AUTO-ENTMCNC: 36.1 G/DL (ref 33.7–35.3)
MCV RBC AUTO: 85.5 FL (ref 81.4–97.8)
METAMYELOCYTES NFR BLD MANUAL: 1.8 %
MONOCYTES # BLD AUTO: 1.09 K/UL (ref 0–0.85)
MONOCYTES NFR BLD AUTO: 4.5 % (ref 0–13.4)
MORPHOLOGY BLD-IMP: NORMAL
MYELOCYTES NFR BLD MANUAL: 2.7 %
NEUTROPHILS # BLD AUTO: 19.63 K/UL (ref 1.82–7.42)
NEUTROPHILS NFR BLD: 80.2 % (ref 44–72)
NEUTS BAND NFR BLD MANUAL: 0.9 % (ref 0–10)
NRBC # BLD AUTO: 0 K/UL
NRBC BLD-RTO: 0 /100 WBC
PLATELET # BLD AUTO: 199 K/UL (ref 164–446)
PLATELET BLD QL SMEAR: NORMAL
PMV BLD AUTO: 11 FL (ref 9–12.9)
PROCALCITONIN SERPL-MCNC: 5.9 NG/ML
PROMYELOCYTES NFR BLD MANUAL: 0.9 %
RBC # BLD AUTO: 3.79 M/UL (ref 4.7–6.1)
RBC BLD AUTO: NORMAL
SIGNIFICANT IND 70042: ABNORMAL
SIGNIFICANT IND 70042: ABNORMAL
SITE SITE: ABNORMAL
SITE SITE: ABNORMAL
SOURCE SOURCE: ABNORMAL
SOURCE SOURCE: ABNORMAL
WBC # BLD AUTO: 24.2 K/UL (ref 4.8–10.8)

## 2018-09-25 PROCEDURE — 87535 HIV-1 PROBE&REVERSE TRNSCRPJ: CPT

## 2018-09-25 PROCEDURE — A9270 NON-COVERED ITEM OR SERVICE: HCPCS | Performed by: STUDENT IN AN ORGANIZED HEALTH CARE EDUCATION/TRAINING PROGRAM

## 2018-09-25 PROCEDURE — 700101 HCHG RX REV CODE 250: Performed by: STUDENT IN AN ORGANIZED HEALTH CARE EDUCATION/TRAINING PROGRAM

## 2018-09-25 PROCEDURE — 700105 HCHG RX REV CODE 258: Performed by: HOSPITALIST

## 2018-09-25 PROCEDURE — 99223 1ST HOSP IP/OBS HIGH 75: CPT | Performed by: INTERNAL MEDICINE

## 2018-09-25 PROCEDURE — 700111 HCHG RX REV CODE 636 W/ 250 OVERRIDE (IP): Performed by: INTERNAL MEDICINE

## 2018-09-25 PROCEDURE — 71045 X-RAY EXAM CHEST 1 VIEW: CPT

## 2018-09-25 PROCEDURE — 36415 COLL VENOUS BLD VENIPUNCTURE: CPT

## 2018-09-25 PROCEDURE — 94760 N-INVAS EAR/PLS OXIMETRY 1: CPT

## 2018-09-25 PROCEDURE — 700111 HCHG RX REV CODE 636 W/ 250 OVERRIDE (IP): Performed by: HOSPITALIST

## 2018-09-25 PROCEDURE — 770006 HCHG ROOM/CARE - MED/SURG/GYN SEMI*

## 2018-09-25 PROCEDURE — A9270 NON-COVERED ITEM OR SERVICE: HCPCS | Performed by: HOSPITALIST

## 2018-09-25 PROCEDURE — 700102 HCHG RX REV CODE 250 W/ 637 OVERRIDE(OP): Performed by: STUDENT IN AN ORGANIZED HEALTH CARE EDUCATION/TRAINING PROGRAM

## 2018-09-25 PROCEDURE — 85027 COMPLETE CBC AUTOMATED: CPT

## 2018-09-25 PROCEDURE — 85007 BL SMEAR W/DIFF WBC COUNT: CPT

## 2018-09-25 PROCEDURE — 99232 SBSQ HOSP IP/OBS MODERATE 35: CPT | Performed by: INTERNAL MEDICINE

## 2018-09-25 PROCEDURE — 700102 HCHG RX REV CODE 250 W/ 637 OVERRIDE(OP): Performed by: HOSPITALIST

## 2018-09-25 PROCEDURE — 700102 HCHG RX REV CODE 250 W/ 637 OVERRIDE(OP): Performed by: INTERNAL MEDICINE

## 2018-09-25 PROCEDURE — A9270 NON-COVERED ITEM OR SERVICE: HCPCS | Performed by: INTERNAL MEDICINE

## 2018-09-25 PROCEDURE — 84145 PROCALCITONIN (PCT): CPT

## 2018-09-25 RX ORDER — IPRATROPIUM BROMIDE AND ALBUTEROL SULFATE 2.5; .5 MG/3ML; MG/3ML
3 SOLUTION RESPIRATORY (INHALATION)
Status: DISCONTINUED | OUTPATIENT
Start: 2018-09-25 | End: 2018-09-27 | Stop reason: HOSPADM

## 2018-09-25 RX ORDER — AMOXICILLIN 500 MG/1
500 CAPSULE ORAL EVERY 8 HOURS
Status: DISCONTINUED | OUTPATIENT
Start: 2018-09-25 | End: 2018-09-26

## 2018-09-25 RX ORDER — SODIUM CHLORIDE AND POTASSIUM CHLORIDE 150; 450 MG/100ML; MG/100ML
INJECTION, SOLUTION INTRAVENOUS CONTINUOUS
Status: DISCONTINUED | OUTPATIENT
Start: 2018-09-25 | End: 2018-09-26

## 2018-09-25 RX ADMIN — AMOXICILLIN 500 MG: 500 CAPSULE ORAL at 21:12

## 2018-09-25 RX ADMIN — CEFTRIAXONE 2 G: 2 INJECTION, POWDER, FOR SOLUTION INTRAMUSCULAR; INTRAVENOUS at 05:53

## 2018-09-25 RX ADMIN — NICOTINE 7 MG: 7 PATCH, EXTENDED RELEASE TRANSDERMAL at 05:54

## 2018-09-25 RX ADMIN — FAMOTIDINE 20 MG: 20 TABLET ORAL at 17:11

## 2018-09-25 RX ADMIN — POTASSIUM CHLORIDE AND SODIUM CHLORIDE: 450; 150 INJECTION, SOLUTION INTRAVENOUS at 11:48

## 2018-09-25 RX ADMIN — FAMOTIDINE 20 MG: 10 INJECTION, SOLUTION INTRAVENOUS at 05:53

## 2018-09-25 RX ADMIN — OXYCODONE HYDROCHLORIDE 5 MG: 5 TABLET ORAL at 09:01

## 2018-09-25 RX ADMIN — OXYCODONE HYDROCHLORIDE 5 MG: 5 TABLET ORAL at 15:39

## 2018-09-25 ASSESSMENT — COGNITIVE AND FUNCTIONAL STATUS - GENERAL
STANDING UP FROM CHAIR USING ARMS: A LITTLE
SUGGESTED CMS G CODE MODIFIER MOBILITY: CK
SUGGESTED CMS G CODE MODIFIER DAILY ACTIVITY: CH
WALKING IN HOSPITAL ROOM: A LITTLE
CLIMB 3 TO 5 STEPS WITH RAILING: A LOT
MOVING FROM LYING ON BACK TO SITTING ON SIDE OF FLAT BED: A LITTLE
MOBILITY SCORE: 19
DAILY ACTIVITIY SCORE: 24

## 2018-09-25 ASSESSMENT — ENCOUNTER SYMPTOMS
FEVER: 0
VOMITING: 0
SPUTUM PRODUCTION: 1
SHORTNESS OF BREATH: 0
ABDOMINAL PAIN: 0
COUGH: 1
NAUSEA: 0
HEARTBURN: 0
DIARRHEA: 0
CHILLS: 0
CONSTIPATION: 0

## 2018-09-25 ASSESSMENT — PAIN SCALES - GENERAL
PAINLEVEL_OUTOF10: 3
PAINLEVEL_OUTOF10: 7
PAINLEVEL_OUTOF10: 7
PAINLEVEL_OUTOF10: 4

## 2018-09-25 NOTE — FLOWSHEET NOTE
09/25/18 1520   Interdisciplinary Plan of Care-Goals (Indications)   Hyperinflation Protocol Indications Atelectasis Documented by Chest X-Ray   Interdisciplinary Plan of Care-Outcomes    Hyperinflation Protocol Goals/Outcome Greater Than 60% of Predicted I.S. Volume x 24 hrs;Stable Vital Capacity x24 hrs and Patient Understands / uses I.S.   Education   Education Yes - Pt. / Family has been Instructed in use of Respiratory Equipment   Incentive Spirometry Group   Incentive Spirometry Instruction Yes   Breathing Exercises Yes   Incentive Spirometer Volume 750 mL   Respiratory WDL   Respiratory (WDL) X   Chest Exam   Work Of Breathing / Effort Mild   Respiration 18   Pulse 72   Breath Sounds   RUL Breath Sounds Clear   RML Breath Sounds Clear   RLL Breath Sounds Diminished   PIETER Breath Sounds Clear   LLL Breath Sounds Diminished   Oximetry   #Pulse Oximetry (Single Determination) Yes   Oxygen   Pulse Oximetry 95 %   O2 (LPM) 0   O2 Daily Delivery Respiratory  Room Air with O2 Available

## 2018-09-25 NOTE — CARE PLAN
Problem: Pain Management  Goal: Pain level will decrease to patient's comfort goal  Outcome: PROGRESSING AS EXPECTED  Pt medicated for pain per MAR. Pt provided with hot pack for back. Pt up to bedside chair helps with pain.     Problem: Respiratory:  Goal: Respiratory status will improve  Outcome: PROGRESSING AS EXPECTED  Pt encouraged use of IS, pt uses correctly, encouraged to ambulate often.

## 2018-09-25 NOTE — CARE PLAN
Problem: Safety  Goal: Will remain free from falls  Patient calls appropriately for assistance. Bed locked and in lowest position; Call light and personal belongings within reach. Patient wearing treaded, non-slip socks. Fall precautions and hourly rounding implemented.    Problem: Pain Management  Goal: Pain level will decrease to patient's comfort goal  Patient has pharmacological interventions available for pain management.

## 2018-09-25 NOTE — NON-PROVIDER
Subjective  Patient is a 26 Y/O M who presented to the ED on 9/22 for worsening SOB with fevers, chills, malaise, productive blood-tinged sputum, nausea, vomiting, headache and pleuritic chest pain. He was diagnosed with a pneumomediastinum and community-acquired pneumonia at that time. He was admitted to the ICU that night for sepsis and underwent workup for his pneumomediastinum. Gastroenterology was consulted who evaluated his esophagus, but found no convincing evidence of ruptured esophagus. His barium swallow was negative. His chest x-ray showed a minimal apical pneumothorax, but did not indicate that the integrity of the airway was otherwise comprised. The CXR also showed bilateral pulmonary opacities, the left worse than right. There is extensive left lower and upper lobe consolidation. His blood cultures were positive for Streptococcus pneumoniae (x2).     Two days ago, he developed acute respiratory failure with hypoxia and was placed on an oxymask. This resolved and the oxymask was removed early yesterday. He was weaned off oxygen, and is now stable on room air. He was placed on NPO at admission, but tolerated clear liquid diet well yesterday. He was advanced to full diet today and is tolerating this well too.    His pneumonia was empirically treated with IV Unasyn and Vancomycin. The cultures show pan-sensitivity, so now he is being treated with IV Ceftriaxone.    Past medical history is insignificant for respiratory issues. He admits to smoking 5 cigarettes per day for 3 years, and smoked until admission. He also admits to drinking alcohol heavily for several years. He states he drank a half-bottle of liquor several nights per week, but quit last year.    He reports cough is still present with sputum production. He also reports several episodes of watery diarrhea. Pt denies any newfound fever, cough, chills, nausea, vomiting, hematochezia, melena, or BRBPR.    Objective  Physical Exam   Constitutional:  He is oriented to person, place, and time. He appears lethargic. He appears ill. He is not intubated.   HENT:   Head: Normocephalic and atraumatic.   Neck: Full passive range of motion without pain. Neck supple. No tracheal tenderness present. No tracheal deviation present.   Cardiovascular: Normal rate, regular rhythm, S1 normal, S2 normal and normal heart sounds.  Exam reveals no gallop and no friction rub.    No murmur heard.  Pulses:       Radial pulses are 2+ on the right side, and 2+ on the left side.   Pulmonary/Chest: Effort normal. He is not intubated. He has decreased breath sounds in the right lower field, the left upper field, the left middle field and the left lower field. He has no wheezes. He has no rhonchi. He has no rales.   Abdominal: Soft. Normal appearance and bowel sounds are normal. He exhibits no pulsatile liver. There is no tenderness. There is guarding.   Neurological: He is oriented to person, place, and time. He appears lethargic.   Skin: Skin is warm, dry and intact. Capillary refill takes less than 2 seconds.   Psychiatric: He has a normal mood and affect.     Assessment  Patient is a 28 Y/O M with pneumomediastinum and community-acquired pneumonia, who has an otherwise unremarkable past medical history. He is recovering as expected in the med-surg floor.     Problem List:  1. Pneumomediastinum  2. Pneumonia  3. Smoking cessation  4. Leukocytosis    NEURO: Pain well-controlled.  CV: Stable.  PULM: Evidence of apical pneumothorax on right lung, pneumonia, and pneumomediastinum.  GI: Diarrhea, otherwise stable.  RENAL/: Stable.  ID/HEME: Controlled with IV antibiotics; patient no longer septic.    Plan  1. Pneumomediastinum - monitor for recurrence using CXR and daily physical examinations. Consider use of bronchoscope if patient begins to experience recurrence.   2. Pneumonia - Monitor clinically. Prescribe PO amoxicillin as PO medications are tolerated for a total 14 day regimen of  antibiotics. Repeat CXR and cultures if condition does not improve.  3. Smoking cessation - utilize motivational interviewing to encourage him to quit smoking.  4. Leukocytosis - monitor for trends and left-shift.     NEURO: Cont pain medication regimen.  CV: Cont use of monitors.  PULM: Cont use of IS and encourage deep-breathing exercises.  GI: Advance diet to full diet today. Cont Famotidine (for 1 month) to control dyspepsia. Monitor for diarrhea  RENAL/: Cont IV fluids.  ID/HEME: Switch from IV Ceftriaxone to PO Amoxicillin.  PROPHYLAXIS: Cont out-of-bed ambulation, heparin SQ and use of IS.

## 2018-09-25 NOTE — CONSULTS
ADULT INFECTIOUS DISEASE CONSULT     Date of Service: 9/25/2018    Consult Requested By: Zak Noble M.D.    Reason for Consultation: Invasive pneumococcal disease    History of Present Illness:   Gabriel Lemos is a 27 y.o. male with no significant past medical history other than tobacco abuse and hyperactive thyroid presented to the ER on 9/22/2018 with complaints of vomiting and body aches.  He apparently had been sick for 6 days.  He also had crampy abdominal pains.  In the ER he was found to have bilateral pulmonary infiltrates and pneumomediastinum.  He complained of some fevers chills and sweating.  There is no history of IV drug use or alcohol abuse.  He also had mild hematemesis.  He was evaluated by gastroenterology.  He was diagnosed with esophageal tear.  His blood cultures have come back positive for strep pneumo.  In view of those issues infectious disease consult has been called.  Review Of Systems:  Gen.-had fevers, chills.  Had 10 pound weight loss over 2 months.  Feels slightly better now  HEENT- denies any sore throat, headache or vision changes  Pulmonary-had cough and shortness of breath  Cardiovascular- denies any chest pain, leg swelling.    GI-had nausea and vomiting with retching  Musculoskeletal- denies any joint pains or swelling  Neuro- denies any weakness or sensory change  Psych- denies any depression or suicidal ideation  Genitourinary- denies any frequency or dysuria  Skin-      PMH:   History reviewed. No pertinent past medical history.      PSH:  History reviewed. No pertinent surgical history.    FAMILY HX:  History reviewed. No pertinent family history.    SOCIAL HX:  Social History     Social History   • Marital status:      Spouse name: N/A   • Number of children: N/A   • Years of education: N/A     Occupational History   • Not on file.     Social History Main Topics   • Smoking status: Current Every Day Smoker     Packs/day: 0.50     Years: 3.00     Types: Cigarettes   •  Smokeless tobacco: Never Used   • Alcohol use No   • Drug use: No   • Sexual activity: Not on file     Other Topics Concern   • Not on file     Social History Narrative   • No narrative on file     History   Smoking Status   • Current Every Day Smoker   • Packs/day: 0.50   • Years: 3.00   • Types: Cigarettes   Smokeless Tobacco   • Never Used     History   Alcohol Use No       Allergies/Intolerances:  No Known Allergies    History reviewed with the patient    Other Current Medications:    Current Facility-Administered Medications:   •  0.45% NaCl with KCl 20 mEq infusion, , Intravenous, Continuous, Melodie Molina M.D.  •  cefTRIAXone (ROCEPHIN) 2 g in  mL IVPB, 2 g, Intravenous, Q24HRS, Von Antony M.D., Stopped at 09/25/18 0623  •  senna-docusate (PERICOLACE or SENOKOT S) 8.6-50 MG per tablet 2 Tab, 2 Tab, Oral, BID, Stopped at 09/23/18 0600 **AND** polyethylene glycol/lytes (MIRALAX) PACKET 1 Packet, 1 Packet, Oral, QDAY PRN **AND** magnesium hydroxide (MILK OF MAGNESIA) suspension 30 mL, 30 mL, Oral, QDAY PRN **AND** bisacodyl (DULCOLAX) suppository 10 mg, 10 mg, Rectal, QDAY PRN, Carrington Harden M.D.  •  Respiratory Care per Protocol, , Nebulization, Continuous RT, Carrington Harden M.D.  •  acetaminophen (TYLENOL) tablet 650 mg, 650 mg, Oral, Q6HRS PRN, Carrington Harden M.D., 650 mg at 09/24/18 0714  •  Notify provider if pain remains uncontrolled, , , CONTINUOUS **AND** Use the numeric rating scale (NRS-11) on regular floors and Critical-Care Pain Observation Tool (CPOT) on ICUs/Trauma to assess pain, , , CONTINUOUS **AND** Pulse Ox (Oximetry), , , CONTINUOUS **AND** Pharmacy Consult Request ...Pain Management Review, , Other, PRN **AND** If patient difficult to arouse and/or has respiratory depression, stop any opiates that are currently infusing and call a Rapid Response., , , CONTINUOUS **AND** oxyCODONE immediate-release (ROXICODONE) tablet 2.5 mg, 2.5 mg, Oral, Q3HRS PRN, 2.5  "mg at 18 195 **AND** oxyCODONE immediate-release (ROXICODONE) tablet 5 mg, 5 mg, Oral, Q3HRS PRN, 5 mg at 18 0901 **AND** morphine (pf) 4 mg/ml injection 2 mg, 2 mg, Intravenous, Q3HRS PRN, Carrington Harden M.D., 2 mg at 18 1008  •  ondansetron (ZOFRAN) syringe/vial injection 4 mg, 4 mg, Intravenous, Q4HRS PRN, Carrington Harden M.D.  •  ondansetron (ZOFRAN ODT) dispertab 4 mg, 4 mg, Oral, Q4HRS PRN, Carrington Harden M.D.  •  promethazine (PHENERGAN) tablet 12.5-25 mg, 12.5-25 mg, Oral, Q4HRS PRN, Carrington Harden M.D.  •  promethazine (PHENERGAN) suppository 12.5-25 mg, 12.5-25 mg, Rectal, Q4HRS PRN, Carrington Harden M.D.  •  prochlorperazine (COMPAZINE) injection 5-10 mg, 5-10 mg, Intravenous, Q4HRS PRN, Carrington Harden M.D.  •  famotidine (PEPCID) tablet 20 mg, 20 mg, Oral, BID, 20 mg at 18 1737 **OR** famotidine (PEPCID) injection 20 mg, 20 mg, Intravenous, BID, Carrington Harden M.D., 20 mg at 18 0553  •  nicotine (NICODERM) 7 MG/24HR 7 mg, 7 mg, Transdermal, Daily-0600, Riki Osorio M.D., 7 mg at 18 0554  •  heparin injection 5,000 Units, 5,000 Units, Subcutaneous, Q8HRS, Riki Osorio M.D., 5,000 Units at 18 3316  [unfilled]    Most Recent Vital Signs:  /57   Pulse 80   Temp 36.6 °C (97.8 °F)   Resp 18   Ht 1.651 m (5' 5\")   Wt 58.5 kg (128 lb 15.5 oz)   SpO2 92%   BMI 21.46 kg/m²   Temp  Av.8 °C (98.3 °F)  Min: 36.3 °C (97.3 °F)  Max: 37.8 °C (100.1 °F)    Physical Exam:  General: Thin -American male looks tired but nontoxic  HEENT: sclera anicteric, PERRL, EOMI, MMM, no oral lesions  Neck: supple, no lymphadenopathy  Chest: Rales at the left base  Cardiac: Regular, no murmurs no gallops heard tachycardia  Abdomen: + bowel sounds, soft, non-tender, non-distended, no HSM  Extremities: No edema. No joint swelling.  Skin: no rashes or erythema  Neuro: Alert and oriented times 3, non-focal exam    Pertinent " "Lab Results:  Recent Labs      09/23/18   0306 09/24/18   0502  09/25/18   0211   WBC  10.5  29.0*  24.2*      Recent Labs      09/23/18   0306 09/24/18   0502  09/25/18   0211   HEMOGLOBIN  13.9*  14.2  11.7*   HEMATOCRIT  37.6*  39.2*  32.4*   MCV  83.7  84.5  85.5   MCH  31.0  30.6  30.9   MACROCYTOSIS  1+  1+   --    ANISOCYTOSIS  1+  1+   --    PLATELETCT  174  207  199         Recent Labs      09/22/18 2009 09/23/18   0306 09/24/18   0502   SODIUM  136  139  139   POTASSIUM  4.1  3.9  4.0   CHLORIDE  103  106  105   CO2  25  25  27   CREATININE  0.95  0.90  0.82        Recent Labs      09/22/18 2009 09/23/18 0306   ALBUMIN  2.8*  2.9*        Pertinent Micro:  Results     Procedure Component Value Units Date/Time    BLOOD CULTURE [[id number]]  (Abnormal) Collected:  09/22/18 1407    Order Status:  Completed Specimen:  Blood from Peripheral Updated:  09/25/18 0822     Significant Indicator POS (POS)     Source BLD     Site PERIPHERAL     Blood Culture Growth detected by Bactec instrument. 09/23/2018  08:57 (A)      Streptococcus pneumoniae  See previous culture for sensitivity report.   (A)    Narrative:       CALL  [place]  161 tel. [phone],  Per Hospital Policy: Only change Specimen Src: to \"Line\" if  specified by physician order.    BLOOD CULTURE [[id number]]  (Abnormal)  (Susceptibility) Collected:  09/22/18 1407    Order Status:  Completed Specimen:  Blood from Peripheral Updated:  09/25/18 0820     Significant Indicator POS (POS)     Source BLD     Site PERIPHERAL     Blood Culture Growth detected by Bactec instrument. 09/23/2018  08:54 (A)      Streptococcus pneumoniae (A)    Narrative:       CALL  [place]  161 tel. [phone],  Per Hospital Policy: Only change Specimen Src: to \"Line\" if  specified by physician order.    Culture & Susceptibility     STREPTOCOCCUS PNEUMONIAE     Antibiotic Sensitivity Microscan Unit Status    Cefotaxime-meningitis Sensitive .012 mcg/mL Final    Method: SENSITIVITY, " "E-TEST    Cefotaxime-non meningitis Sensitive .012 mcg/mL Final    Method: SENSITIVITY, E-TEST    Erythromycin Sensitive .125 mcg/mL Final    Method: SENSITIVITY, E-TEST    Penicillin non-meningitis Sensitive .016 mcg/mL Final    Method: SENSITIVITY, E-TEST    Penicillin-meningitis Sensitive .016 mcg/mL Final    Method: SENSITIVITY, E-TEST                       SENSITIVITY, E-TEST [[id number]] Collected:  09/22/18 1407    Order Status:  Completed Specimen:  Blood Updated:  09/24/18 1054     ETEST Sensitivity INTERIM    Narrative:       161 tel. [phone] 09/23/2018, 08:56, RB PERF. RESULTS CALLED TO:TRVC, RN  Per Hospital Policy: Only change Specimen Src: to \"Line\" if  specified by physician order.    BLOOD CULTURE [[id number]] Collected:  09/23/18 1425    Order Status:  Completed Specimen:  Blood from Peripheral Updated:  09/24/18 0844     Significant Indicator NEG     Source BLD     Site PERIPHERAL     Blood Culture No Growth    Note: Blood cultures are incubated for 5 days and  are monitored continuously.Positive blood cultures  are called to the RN and reported as soon as  they are identified.      Narrative:       Collected By:[id number] WIL ALONZO  Per Hospital Policy: Only change Specimen Src: to \"Line\" if  specified by physician order.    BLOOD CULTURE [[id number]] Collected:  09/23/18 1429    Order Status:  Completed Specimen:  Blood from Peripheral Updated:  09/24/18 0844     Significant Indicator NEG     Source BLD     Site PERIPHERAL     Blood Culture No Growth    Note: Blood cultures are incubated for 5 days and  are monitored continuously.Positive blood cultures  are called to the RN and reported as soon as  they are identified.      Narrative:       Collected By:[id number] WIL ALONZO  X2  Per Hospital Policy: Only change Specimen Src: to \"Line\" if  specified by physician order.    MRSA BY PCR (AMP) [[id number]] Collected:  09/23/18 1004    Order Status:  Completed Specimen:  Respirate from " Nares Updated:  09/23/18 1452     Significant Indicator NEG     Source RESP     Site NARES     MRSA PCR Negative for MRSA by PCR.    Narrative:       Collected By:[id number] WIL ALONZO    BLOOD CULTURE [[id number]]     Order Status:  Canceled Specimen:  Blood from Peripheral     MRSA BY PCR (AMP) [[id number]]     Order Status:  Canceled Specimen:  Respirate from Nares     GRAM STAIN [[id number]] Collected:  09/22/18 1605    Order Status:  Completed Specimen:  Respirate Updated:  09/23/18 1335     Significant Indicator .     Source RESP     Site SPUTUM     Gram Stain Result Sputum Gram stain quality score is <1, probable  oropharyngeal contamination. Culture not performed.  Recollect if clinically indicated.      Narrative:       Preferably before first antibiotic dose - add Gram stain if  indicated    INFLUENZA A/B BY PCR [[id number]] Collected:  09/22/18 0000    Order Status:  Completed Specimen:  Throat Updated:  09/22/18 1714     Influenza virus A RNA Negative     Influenza virus B, PCR Negative    Culture Respiratory W/ GRM STN [[id number]] Collected:  09/22/18 1605    Order Status:  Canceled Specimen:  Sputum from Sputum Updated:  09/22/18 1700    CULTURE RESPIRATORY W/ GRM STN [[id number]]     Order Status:  Completed Specimen:  Respirate from Sputum     URINALYSIS CULTURE, IF INDICATED [[id number]]  (Abnormal) Collected:  09/22/18 0945    Order Status:  Completed Specimen:  Urine Updated:  09/22/18 0953     Color Yellow     Character Clear     Specific Gravity 1.010     Ph 6.0     Glucose Negative mg/dL      Ketones Negative mg/dL      Protein 30 (A) mg/dL      Bilirubin Negative     Urobilinogen, Urine 1.0     Nitrite Negative     Leukocyte Esterase Negative     Occult Blood Small (A)     Micro Urine Req Microscopic    INFLUENZA RAPID [[id number]] Collected:  09/22/18 0000    Order Status:  Canceled Specimen:  Other from Respiratory     RESPIRATORY VIRUS PANEL BY PCR [[id number]] Collected:  09/22/18 0000     Order Status:  Canceled Specimen:  Respirate from Nasopharyngeal     INFLUENZA A/B BY PCR [[id number]] Collected:  09/22/18 0000    Order Status:  Canceled Specimen:  Respirate from Nasopharyngeal         Blood Culture   Date Value Ref Range Status   09/23/2018   Preliminary    No Growth    Note: Blood cultures are incubated for 5 days and  are monitored continuously.Positive blood cultures  are called to the RN and reported as soon as  they are identified.          Studies:  Ct-chest (thorax) With    Result Date: 9/22/2018 9/22/2018 2:04 PM HISTORY/REASON FOR EXAM:  Thoracic Pain. Pneumomediastinum.  Extensive pneumonia. TECHNIQUE/EXAM DESCRIPTION: CT scan of the chest with contrast. Thin-section helical images were obtained from the lung apices through the adrenal glands following the bolus administration of contrast. 75 mL of Omnipaque 350 nonionic contrast was utilized. Low dose optimization technique was utilized for this CT exam including automated exposure control and adjustment of the mA and/or kV according to patient size. COMPARISON:  Chest x-ray 9/22/2018 FINDINGS: Thoracic aorta is normal in caliber. Pneumomediastinum present tracking into the lower neck and retroclavicular soft tissues, primarily on the RIGHT. Extensive consolidation of the LEFT upper and lower lobes with air bronchograms present. Multiple foci of ill-defined opacity in the RIGHT upper and lower lobes. No major bony abnormality is seen. Contrast present within the stomach and bowel from recent esophagram. No extravasation of contrast into the mediastinum.     1.  Pneumomediastinum as seen on recent chest x-ray. 2.  Extensive multifocal pneumonia particularly involving the LEFT upper and lower lobes.  Multifocal ill-defined parenchymal opacities in the RIGHT lung.  Follow-up recommended to resolution to exclude underlying process. 3.  No pneumothorax demonstrated. 4.  No extravasation of oral contrast into the mediastinum.      Dx-chest-limited (1 View)    Result Date: 9/24/2018 9/24/2018 8:15 AM HISTORY/REASON FOR EXAM:  Shortness of Breath TECHNIQUE/EXAM DESCRIPTION AND NUMBER OF VIEWS: Single portable view of the chest. COMPARISON: 9/23/2018 FINDINGS: Slightly less conspicuous pneumomediastinum. Extensive airspace opacities in the left mid and lower lung. No pleural effusion. No appreciable pneumothorax. Stable cardiopericardial silhouette.     Slightly less conspicuous pneumomediastinum.    Dx-chest-portable (1 View)    Result Date: 9/25/2018 9/25/2018 8:29 AM HISTORY/REASON FOR EXAM:  Shortness of Breath. TECHNIQUE/EXAM DESCRIPTION AND NUMBER OF VIEWS: Single portable view of the chest. COMPARISON: 9/24/2018 FINDINGS: LUNGS: Stable consolidation in the left lung. Stable elevation of the left hemidiaphragm. Right lung is clear. PNEUMOTHORAX: None. LINES AND TUBES: None. MEDIASTINUM: No cardiomegaly. Minimal pneumomediastinum, unchanged. MUSCULOSKELETAL STRUCTURES: No acute fracture.     1.  Minimal pneumomediastinum, unchanged. 2.  Stable left pulmonary consolidation.    Dx-chest-portable (1 View)    Result Date: 9/23/2018 9/23/2018 3:40 AM HISTORY/REASON FOR EXAM:  Cough TECHNIQUE/EXAM DESCRIPTION AND NUMBER OF VIEWS: Single portable view of the chest. COMPARISON: 9/22/2018 FINDINGS: Redemonstration of pneumomediastinum. Extensive airspace opacities in the left mid and lower lung. No pleural effusion. No appreciable pneumothorax. Stable cardiopericardial silhouette.     1. No significant interval change.    Dx-chest-portable (1 View)    Result Date: 9/22/2018 9/22/2018 10:31 AM HISTORY/REASON FOR EXAM:  Cough. Shortness of breath, LEFT flank pain, vomiting. TECHNIQUE/EXAM DESCRIPTION AND NUMBER OF VIEWS: Single portable view of the chest. COMPARISON: None FINDINGS: Cardiomediastinal contour is within normal limits. Lucency consistent with pneumomediastinum. Ill-defined extensive parenchymal opacity in the LEFT mid and lower  lung. Minimal opacity in the RIGHT midlung. Possible small RIGHT apical pneumothorax. Subcutaneous gas in the RIGHT supraclavicular region. No major bony abnormality is seen.     1.  Pneumomediastinum and possible tiny RIGHT apical pneumothorax. 2.  Extensive LEFT mid and lower lung consolidation, likely pneumonia. 3.  Possible minimal RIGHT midlung infiltrate. 4.  This constellation of findings is concerning for esophageal perforation. These findings were discussed with SOFIA CASEY on 9/22/2018 11:03 AM.     Manfred. Fluoro (barium Swallow)    Result Date: 9/22/2018 9/22/2018 12:27 PM HISTORY/REASON FOR EXAM:  Abdominal Pain. Vomiting, pneumomediastinum. TECHNIQUE/EXAM DESCRIPTION AND NUMBER OF VIEWS: Water-soluble esophagram procedure was performed. 20 fluoroscopic images obtained. Fluoroscopy time: 0.6 minutes COMPARISON:  Chest x-ray 9/22/2018 FINDINGS: Initially water-soluble contrast was administered orally.  Esophagus is normal in caliber, without evidence for extravasation. Subsequent to the patient was given oral barium, again showing normal caliber esophagus.     Normal caliber esophagus without evidence for esophageal perforation.  IMPRESSION:     Invasive pneumococcal disease  Pneumomediastinum  Esophageal tear  Tobacco abuse      PLAN:   Gabriel Lemos is a 27 y.o. male with overactive thyroid and admitted with invasive pneumococcal disease.  Has significant bilateral multifocal disease.  Continue with the Rocephin.  Check HIV.  Continue with the supportive measures.  I have reviewed all the records      Discussed with IM. Will continue to follow    Freda Fang M.D.

## 2018-09-25 NOTE — PROGRESS NOTES
Pulmonary Progress Note    PATIENT: Gabriel Lemos  MRN: [id number]  : 1990    Admission date: 2018    ASSESSMENT/PLAN:  Active Problems:    Pneumonia POA: Yes    Pneumomediastinum (HCC) POA: Unknown    N&V (nausea and vomiting) POA: Unknown    Barium swallow nl      28 y/o man admitted with pneumonia and pneumomediastinum secondary to vomiting.  Improving rapidly on rocephin.  Grew S pneumo from blood.    SUBJECTIVE:  Feeling better    MEDICATIONS:    Current Facility-Administered Medications:   •  0.45% NaCl with KCl 20 mEq infusion, , Intravenous, Continuous, Melodie Molina M.D.  •  cefTRIAXone (ROCEPHIN) 2 g in  mL IVPB, 2 g, Intravenous, Q24HRS, Von Antony M.D., Stopped at 18 0623  •  senna-docusate (PERICOLACE or SENOKOT S) 8.6-50 MG per tablet 2 Tab, 2 Tab, Oral, BID, Stopped at 18 0600 **AND** polyethylene glycol/lytes (MIRALAX) PACKET 1 Packet, 1 Packet, Oral, QDAY PRN **AND** magnesium hydroxide (MILK OF MAGNESIA) suspension 30 mL, 30 mL, Oral, QDAY PRN **AND** bisacodyl (DULCOLAX) suppository 10 mg, 10 mg, Rectal, QDAY PRN, Carrington Harden M.D.  •  Respiratory Care per Protocol, , Nebulization, Continuous RT, Carrington Harden M.D.  •  acetaminophen (TYLENOL) tablet 650 mg, 650 mg, Oral, Q6HRS PRN, Carrington Harden M.D., 650 mg at 18 0714  •  Notify provider if pain remains uncontrolled, , , CONTINUOUS **AND** Use the numeric rating scale (NRS-11) on regular floors and Critical-Care Pain Observation Tool (CPOT) on ICUs/Trauma to assess pain, , , CONTINUOUS **AND** Pulse Ox (Oximetry), , , CONTINUOUS **AND** Pharmacy Consult Request ...Pain Management Review, , Other, PRN **AND** If patient difficult to arouse and/or has respiratory depression, stop any opiates that are currently infusing and call a Rapid Response., , , CONTINUOUS **AND** oxyCODONE immediate-release (ROXICODONE) tablet 2.5 mg, 2.5 mg, Oral, Q3HRS PRN, 2.5 mg at 18 **AND**  "oxyCODONE immediate-release (ROXICODONE) tablet 5 mg, 5 mg, Oral, Q3HRS PRN, 5 mg at 09/25/18 0901 **AND** morphine (pf) 4 mg/ml injection 2 mg, 2 mg, Intravenous, Q3HRS PRN, Carrington Harden M.D., 2 mg at 09/23/18 1008  •  ondansetron (ZOFRAN) syringe/vial injection 4 mg, 4 mg, Intravenous, Q4HRS PRN, Carrington Harden M.D.  •  ondansetron (ZOFRAN ODT) dispertab 4 mg, 4 mg, Oral, Q4HRS PRN, Carrington Harden M.D.  •  promethazine (PHENERGAN) tablet 12.5-25 mg, 12.5-25 mg, Oral, Q4HRS PRN, Carrington Harden M.D.  •  promethazine (PHENERGAN) suppository 12.5-25 mg, 12.5-25 mg, Rectal, Q4HRS PRN, Carrington Harden M.D.  •  prochlorperazine (COMPAZINE) injection 5-10 mg, 5-10 mg, Intravenous, Q4HRS PRN, Carrington Harden M.D.  •  famotidine (PEPCID) tablet 20 mg, 20 mg, Oral, BID, 20 mg at 09/24/18 1737 **OR** famotidine (PEPCID) injection 20 mg, 20 mg, Intravenous, BID, Carrington Harden M.D., 20 mg at 09/25/18 0553  •  nicotine (NICODERM) 7 MG/24HR 7 mg, 7 mg, Transdermal, Daily-0600, Riki Osorio M.D., 7 mg at 09/25/18 0598  •  heparin injection 5,000 Units, 5,000 Units, Subcutaneous, Q8HRS, Riki Osorio M.D., 5,000 Units at 09/24/18 0951    OBJECTIVE:    /57   Pulse 80   Temp 36.6 °C (97.8 °F)   Resp 18   Ht 1.651 m (5' 5\")   Wt 58.5 kg (128 lb 15.5 oz)   SpO2 92%   BMI 21.46 kg/m²   General: alert, conversant in full sentences  HEENT: Sclera clear, conjunctiva pink, oropharynx clear, mucus membranes moist  Heart: regular  Lungs: rales left posteriorly  Abdomen: soft  Extremities: no clubbing cyanosis or edema  Neuro: intact  Skin: intact    DATA REVIEW:  LABORATORY DATA:    Recent Labs      09/23/18   0306  09/24/18   0502  09/25/18   0211   WBC  10.5  29.0*  24.2*   RBC  4.49*  4.64*  3.79*   HEMOGLOBIN  13.9*  14.2  11.7*   HEMATOCRIT  37.6*  39.2*  32.4*   MCV  83.7  84.5  85.5   MCH  31.0  30.6  30.9   MCHC  37.0*  36.2*  36.1*   RDW  36.4  38.6  39.9   PLATELETCT  174  " 207  199   MPV  10.9  10.8  11.0     Recent Labs      09/22/18 2009 09/23/18   0306  09/24/18   0502   SODIUM  136  139  139   POTASSIUM  4.1  3.9  4.0   CHLORIDE  103  106  105   CO2  25  25  27   GLUCOSE  100*  108*  148*   BUN  13  13  15   CREATININE  0.95  0.90  0.82   CALCIUM  8.1*  8.6  9.2             Lab Results  Component Value Date/Time   LTCJM36J 7.45 09/22/2018 1658   MULBWN287P 38.3 (H) 09/22/2018 1658   TQZTD438Q 46.6 (LL) 09/22/2018 1658   [id number] 82.8 (L) 09/22/2018 1658   ARTHCO3 26 (H) 09/22/2018 1658   ARTBE 2 09/22/2018 1658   BDYTEMP see below 09/22/2018 1658      Cultures strep pneumonia from blood     IMAGING:   CXR (personally reviewed) - infiltrate left      Assessment and plan as above  Time 35 minutes    Oskar Young M.D.

## 2018-09-25 NOTE — PROGRESS NOTES
Senior admit note:     This is a 27M, who is a patient of Dr. Joana Murcia, who was admitted on 9/22 with sepsis, hypoxemic respiratory failure and bacteremia 2/2 CAPNA as well as pneumomediastinum 2/2 vomitting. Patient was admitted to the ICU and [facility] hospitalist initially. Patient was started on broad spectrum IV antibiotics and placed on high flow O2 antibiotics eventually changed to rocephin. Patient began to improve and was downgraded to floor 9/24. Patient grew Strep. Pneu out of blood, sensitivities currently pending. Patient is currently on day 3 of antibiotics. He states that he is still weak but is feeling better. The patients exam appears stable in comparison to prior documentation. GI consulted for pneumomediastinum, appreciate recs. Patient contacted [facility] 9/24 and requested that we take over his care, hospitalist agreed. Will continue antibiotics and respiratory support as needed. See Dr. Molina's H&P for detailed A/P.     David Zuniga M.D.   PGY-2  [facility]   [phone]

## 2018-09-25 NOTE — H&P
FAMILY MEDICINE HISTORY AND PHYSICAL       PATIENT ID:  NAME:  Gabriel Lemos  MRN:               [id number]  YOB: 1990    Date of Admission: 9/22/2018      Attending: Dr. Islas   Senior Resident: David Zuniga MD. (PGY-2)  Zay Resident: Melodie Molina M.D. (PGY-1)    Primary Care Physician:  Dr. Mckenzie     CC:  Vomiting and body aches for 6 days prior to admission      HPI: Gabriel Lemos is a 27 y.o with PMHx of hyperthyroidism who presented on 9/22/18 with 6 days of vomiting, cough, body aches, crampy abdominal pain, generalized body aches, and was found to have sepsis with bacteremia growing strep pneumoniae in 2 blood cultures. Patient also found to have a pneumomediastinum 2/2 esophageal tear 2/2 nausea and vomiting.     ED Course: Extremely tachycardic and received 1 L NS, morphine, and zofran. Lactate was 2.1. CBC was WNL. Patient's CXR found pneumomediastinum and extensive left mid and lower lung consolidation, with right midlung infiltrate, likely PNA. Blood cxs were obtained and patient was admitted to hospitalist service and started on unasyn/vanc for community acquired PNA.    Interval Hx: Since admission, patient's abx were de-escalated to ceftriaxone qday after blood cultures grew strep pneumoniae x 2. Overnight, patient was taken off of high flow NC 15% and was weened to room air, with saturation of 97-98%    REVIEW OF SYSTEMS:   Constitutional: No fevers, no recent unintended weight loss   HEENT: No HA, No changes in vision, no recent loss of hearing   CVS: No Chest pain, no orthopnea   Pulm: + SOB, No dyspnea, + cough  GI: No N/V, + Diarrhea, no blood in stool   : No dysuria, no urgency/frequence, no hematuria   MSK: + myalgias, no joint swelling or pain   Skin: No recent rashes   Psych: No thoughts of self harm                PAST MEDICAL HISTORY:  History reviewed. No pertinent past medical history.    PAST SURGICAL HISTORY:  History reviewed. No pertinent surgical  history.    FAMILY HISTORY:  History reviewed. No pertinent family history.    SOCIAL HISTORY:   Pt lives with fiance   Smoking 5-6 cigarettes/day   Etoh use - none   Drug use - none, stopped using marijuana months ago    IVDA - none     DIET:   Orders Placed This Encounter   Procedures   • Diet Order Full Liquid     Standing Status:   Standing     Number of Occurrences:   1     Order Specific Question:   Diet:     Answer:   Full Liquid [11]       ALLERGIES:  No Known Allergies    OUTPATIENT MEDICATIONS:    Current Facility-Administered Medications:   •  cefTRIAXone (ROCEPHIN) 2 g in  mL IVPB, 2 g, Intravenous, Q24HRS, Von Antony M.D., Last Rate: 200 mL/hr at 09/25/18 0553, 2 g at 09/25/18 0553  •  senna-docusate (PERICOLACE or SENOKOT S) 8.6-50 MG per tablet 2 Tab, 2 Tab, Oral, BID, Stopped at 09/23/18 0600 **AND** polyethylene glycol/lytes (MIRALAX) PACKET 1 Packet, 1 Packet, Oral, QDAY PRN **AND** magnesium hydroxide (MILK OF MAGNESIA) suspension 30 mL, 30 mL, Oral, QDAY PRN **AND** bisacodyl (DULCOLAX) suppository 10 mg, 10 mg, Rectal, QDAY PRN, Carrington Harden M.D.  •  Respiratory Care per Protocol, , Nebulization, Continuous RT, Carrington Harden M.D.  •  acetaminophen (TYLENOL) tablet 650 mg, 650 mg, Oral, Q6HRS PRN, Carrington Harden M.D., 650 mg at 09/24/18 0714  •  Notify provider if pain remains uncontrolled, , , CONTINUOUS **AND** Use the numeric rating scale (NRS-11) on regular floors and Critical-Care Pain Observation Tool (CPOT) on ICUs/Trauma to assess pain, , , CONTINUOUS **AND** Pulse Ox (Oximetry), , , CONTINUOUS **AND** Pharmacy Consult Request ...Pain Management Review, , Other, PRN **AND** If patient difficult to arouse and/or has respiratory depression, stop any opiates that are currently infusing and call a Rapid Response., , , CONTINUOUS **AND** oxyCODONE immediate-release (ROXICODONE) tablet 2.5 mg, 2.5 mg, Oral, Q3HRS PRN, 2.5 mg at 09/23/18 1951 **AND** oxyCODONE  immediate-release (ROXICODONE) tablet 5 mg, 5 mg, Oral, Q3HRS PRN, 5 mg at 18 0910 **AND** morphine (pf) 4 mg/ml injection 2 mg, 2 mg, Intravenous, Q3HRS PRN, Carrington Harden M.D., 2 mg at 18 1008  •  ondansetron (ZOFRAN) syringe/vial injection 4 mg, 4 mg, Intravenous, Q4HRS PRN, Carrington Harden M.D.  •  ondansetron (ZOFRAN ODT) dispertab 4 mg, 4 mg, Oral, Q4HRS PRN, Carrington Harden M.D.  •  promethazine (PHENERGAN) tablet 12.5-25 mg, 12.5-25 mg, Oral, Q4HRS PRN, Carrington Harden M.D.  •  promethazine (PHENERGAN) suppository 12.5-25 mg, 12.5-25 mg, Rectal, Q4HRS PRN, Carrington Harden M.D.  •  prochlorperazine (COMPAZINE) injection 5-10 mg, 5-10 mg, Intravenous, Q4HRS PRN, Carrington Harden M.D.  •  famotidine (PEPCID) tablet 20 mg, 20 mg, Oral, BID, 20 mg at 18 1737 **OR** famotidine (PEPCID) injection 20 mg, 20 mg, Intravenous, BID, Carrington Harden M.D., 20 mg at 18 0553  •  nicotine (NICODERM) 7 MG/24HR 7 mg, 7 mg, Transdermal, Daily-0600, Riki Osorio M.D., 7 mg at 18 0554  •  heparin injection 5,000 Units, 5,000 Units, Subcutaneous, Q8HRS, Riki Osorio M.D., 5,000 Units at 18 2116    PHYSICAL EXAM:  Vitals:    18 1100 18 1715 18 1935 18 0405   BP:  111/76 109/84 119/57   Pulse: (!) 58 76 85 62   Resp: (!) 27 17 18 18   Temp:  36.9 °C (98.5 °F) 36.6 °C (97.9 °F) 36.6 °C (97.8 °F)   TempSrc:       SpO2: 97% 95% 97% 98%   Weight:       Height:       , Temp (24hrs), Av.7 °C (98.1 °F), Min:36.6 °C (97.8 °F), Max:36.9 °C (98.5 °F)  , Pulse Oximetry: 98 %, O2 (LPM): 0, O2 Delivery: None (Room Air)    General: Young, thin -American male resting in acute distress - looks sick, complaining of myalgia, cooperative   Skin:  Pink, warm and dry.  No rashes  HEENT: NC/AT. PERRL. EOMI. No nasal discharge. Oropharynx nonerythematous without exudate/plaques. + Dry mucus membranes   Neck: With lymphadenopathy. No rigidity. No  JVD   Lungs:  Symmetrical.  Not clear to ascultation - poor aeration throughout BL lungs (L worse than R)   CTAB with no W/R/R.  Good air movement   Cardiovascular:  S1/S2 RRR without M/R/G.  Abdomen:  Abdomen is soft NT/ND. +BS. No masses noted.   Extremities:  Full range of motion. No gross deformities noted. 2+ pulses in all extremities. No C/C/E   Spine:  Straight without vertebral anomalies.  CNS:  Muscle tone is normal. Cranial nerves II-XII grossly intact.     LAB TESTS:   Recent Labs      09/23/18   0306  09/24/18   0502  09/25/18   0211   WBC  10.5  29.0*  24.2*   RBC  4.49*  4.64*  3.79*   HEMOGLOBIN  13.9*  14.2  11.7*   HEMATOCRIT  37.6*  39.2*  32.4*   MCV  83.7  84.5  85.5   MCH  31.0  30.6  30.9   RDW  36.4  38.6  39.9   PLATELETCT  174  207  199   MPV  10.9  10.8  11.0   NEUTSPOLYS  75.90*  79.80*  80.20*   LYMPHOCYTES  14.60*  8.80*  9.00*   MONOCYTES  4.30  5.30  4.50   EOSINOPHILS  0.00  0.00  0.00   BASOPHILS  0.00  0.00  0.00   RBCMORPHOLO  Present  Present  Normal         Recent Labs      09/22/18   0853  09/22/18   1710  09/22/18 2009 09/23/18   0306  09/23/18   2241  09/24/18   0502   SODIUM  133*   --   136  139   --   139   POTASSIUM  3.5*   --   4.1  3.9   --   4.0   CHLORIDE  96   --   103  106   --   105   CO2  28   --   25  25   --   27   BUN  15   --   13  13   --   15   CREATININE  1.06   --   0.95  0.90   --   0.82   CALCIUM  9.3   --   8.1*  8.6   --   9.2   MAGNESIUM   --   2.2   --    --   2.6*   --    PHOSPHORUS   --    --    --    --   2.4*   --    ALBUMIN  3.7   --   2.8*  2.9*   --    --        CULTURES:   Results     Procedure Component Value Units Date/Time    BLOOD CULTURE [[id number]]  (Abnormal) Collected:  09/22/18 1407    Order Status:  Completed Specimen:  Blood from Peripheral Updated:  09/24/18 1055     Significant Indicator POS (POS)     Source BLD     Site PERIPHERAL     Blood Culture Growth detected by Bactec instrument. 09/23/2018  08:57 (A)      Streptococcus  "pneumoniae (A)    Narrative:       CALL  [place]  161 tel. [phone],  CALLED  161 tel. [phone] 09/23/2018, 08:57, RB PERF. RESULTS CALLED  TO:[facility], RN  Per Hospital Policy: Only change Specimen Src: to \"Line\" if  specified by physician order.    SENSITIVITY, E-TEST [[id number]] Collected:  09/22/18 1407    Order Status:  Completed Specimen:  Blood Updated:  09/24/18 1054     ETEST Sensitivity INTERIM    Narrative:       161 tel. [phone] 09/23/2018, 08:56, RB PERF. RESULTS CALLED TO:[facility], RN  Per Hospital Policy: Only change Specimen Src: to \"Line\" if  specified by physician order.    BLOOD CULTURE [[id number]]  (Abnormal) Collected:  09/22/18 1407    Order Status:  Completed Specimen:  Blood from Peripheral Updated:  09/24/18 1054     Significant Indicator POS (POS)     Source BLD     Site PERIPHERAL     Blood Culture Growth detected by Bactec instrument. 09/23/2018  08:54 (A)      Streptococcus pneumoniae (A)    Narrative:       CALL  [place]  161 tel. [phone],  CALLED  161 tel. [phone] 09/23/2018, 08:56, RB PERF. RESULTS CALLED  TO:[facility], RN  Per Hospital Policy: Only change Specimen Src: to \"Line\" if  specified by physician order.    BLOOD CULTURE [[id number]] Collected:  09/23/18 1425    Order Status:  Completed Specimen:  Blood from Peripheral Updated:  09/24/18 0844     Significant Indicator NEG     Source BLD     Site PERIPHERAL     Blood Culture No Growth    Note: Blood cultures are incubated for 5 days and  are monitored continuously.Positive blood cultures  are called to the RN and reported as soon as  they are identified.      Narrative:       Collected By:[id number] WIL ALONZO  Per Hospital Policy: Only change Specimen Src: to \"Line\" if  specified by physician order.    BLOOD CULTURE [[id number]] Collected:  09/23/18 1429    Order Status:  Completed Specimen:  Blood from Peripheral Updated:  09/24/18 0844     Significant Indicator NEG     Source BLD     Site PERIPHERAL     Blood Culture No " "Growth    Note: Blood cultures are incubated for 5 days and  are monitored continuously.Positive blood cultures  are called to the RN and reported as soon as  they are identified.      Narrative:       Collected By:[id number] WIL ALONZO  X2  Per Hospital Policy: Only change Specimen Src: to \"Line\" if  specified by physician order.    MRSA BY PCR (AMP) [[id number]] Collected:  09/23/18 1004    Order Status:  Completed Specimen:  Respirate from Nares Updated:  09/23/18 1452     Significant Indicator NEG     Source RESP     Site NARES     MRSA PCR Negative for MRSA by PCR.    Narrative:       Collected By:[id number] WIL ALONZO    BLOOD CULTURE [[id number]]     Order Status:  Canceled Specimen:  Blood from Peripheral     MRSA BY PCR (AMP) [[id number]]     Order Status:  Canceled Specimen:  Respirate from Nares     GRAM STAIN [[id number]] Collected:  09/22/18 1605    Order Status:  Completed Specimen:  Respirate Updated:  09/23/18 1335     Significant Indicator .     Source RESP     Site SPUTUM     Gram Stain Result Sputum Gram stain quality score is <1, probable  oropharyngeal contamination. Culture not performed.  Recollect if clinically indicated.      Narrative:       Preferably before first antibiotic dose - add Gram stain if  indicated    INFLUENZA A/B BY PCR [[id number]] Collected:  09/22/18 0000    Order Status:  Completed Specimen:  Throat Updated:  09/22/18 1714     Influenza virus A RNA Negative     Influenza virus B, PCR Negative    Culture Respiratory W/ GRM STN [[id number]] Collected:  09/22/18 1605    Order Status:  Canceled Specimen:  Sputum from Sputum Updated:  09/22/18 1700    CULTURE RESPIRATORY W/ GRM STN [[id number]]     Order Status:  Completed Specimen:  Respirate from Sputum     URINALYSIS CULTURE, IF INDICATED [[id number]]  (Abnormal) Collected:  09/22/18 0945    Order Status:  Completed Specimen:  Urine Updated:  09/22/18 0953     Color Yellow     Character Clear     Specific Gravity 1.010    "  Ph 6.0     Glucose Negative mg/dL      Ketones Negative mg/dL      Protein 30 (A) mg/dL      Bilirubin Negative     Urobilinogen, Urine 1.0     Nitrite Negative     Leukocyte Esterase Negative     Occult Blood Small (A)     Micro Urine Req Microscopic    INFLUENZA RAPID [[id number]] Collected:  09/22/18 0000    Order Status:  Canceled Specimen:  Other from Respiratory     RESPIRATORY VIRUS PANEL BY PCR [[id number]] Collected:  09/22/18 0000    Order Status:  Canceled Specimen:  Respirate from Nasopharyngeal     INFLUENZA A/B BY PCR [[id number]] Collected:  09/22/18 0000    Order Status:  Canceled Specimen:  Respirate from Nasopharyngeal           IMAGES:    Chest Xray 9/24/18:   FINDINGS:  Slightly less conspicuous pneumomediastinum.  Extensive airspace opacities in the left mid and lower lung.  No pleural effusion. No appreciable pneumothorax.  Stable cardiopericardial silhouette.    CONSULTS:     ASSESSMENT/PLAN: 27 y.o. male admitted for sepsis and bacteremia, growing strep pneumoniae x 2 on blood cultures. Being treated for community acquired pneumonia on ceftriaxone.     # Community acquired PNA growing strep pneumoniae on 2 peripheral blood cultures  # Complicated by sepsis and bacteremia  # Hypoxic respiratory failure requiring high flow oxygen in ICU - improving   Improving, off of high flow now. Transferred out of ICU. Sensitivities came back pan sensitive.   - Patient on day 2 of ceftriaxone 2 g qday, total day 3 of abx coverage (previously on unasyn/azithro/vanc)   - Will transition to oral abx today, amoxicillin, for 14 days   - Titrate O2 as needed  - Pulmonology recommended a bronch if patient decompensates  - Restarted on 1/2 NS 20 meq KCl at 75 ml/hr   - Continue use of incentive spirometer 10 times a day  - Repeat CBC, BMP with magnesium and phosphorous tomorrow am      # Pneumomediastinum likely from an esophageal tear 2/2 nausea / vomiting   Improving: CXR on 9/24/18 showed less conspicuous  pneumomediastinum, improving  Nausea and vomiting improving   - Daily CXR to monitor pneumomediastinum  - Gastroenterology recommended full liquid diet, to advance to soft   - Zofran 4 mg PRN and compazine 5-10 mg q4hrs as needed for nausea   - GI recommends that if patient has new fevers, we should repeat a CT in the case that he may have a mediastinal abscess      # Diarrhea  # likely 2/2 sepsis and PNA  - Continue fluids 1/2 NS 20 meq KCL at 75 ml/hr   - Continue to monitor     #FEN/GI  - Famotidine 20 mg BID     Disposition: Pending clinical improvement. Pending improvement of pneumomediastinum.     # Core Measures   VTE PPx: Heparin 5,000 units   Abx: Ceftriaxone 2 g qday   Fluids: None, encouraging good PO   Lines and Tube: PIV   Diet: Thin liquids, advance to soft diet   Code Status: Full Code       Melodie Molina M.D.   PGY-1   [facility]

## 2018-09-25 NOTE — PROGRESS NOTES
Assumed care of pt at 0700. Received report from RN. Pt A&Ox4. Assessment complete. Labs reviewed. Pt c/o pain of 7/10 this AM, pt medicated per MAR. Pt denies SOB, chest pain, dizziness. Pt up SBA to the bathroom. Pt denies N/V this AM, diet to be advanced. Pt encouraged use of IS, 10x per hour, pt compliant.  Pt and RN discussed plan of care. Pt questions answered. Pt needs are met at this time. Bed in lowest and locked position. Call light within reach. Upper bed rails up. Hourly rounding in place.

## 2018-09-25 NOTE — PROGRESS NOTES
Assumed care of pt. Received change of shift report from day shift RN. Assessed pt per protocol; vital signs stable. No s/sx of distress or discomfort noted; pt denies. A&O x4. Pt has no complaints of pain at this time .Pt is on room air. 20g LAC with NS running at 75mL/hr; 18 RAC saline locked. Bed is locked in lowest position; call light within reach. Will continue to monitor pt.

## 2018-09-26 ENCOUNTER — APPOINTMENT (OUTPATIENT)
Dept: RADIOLOGY | Facility: MEDICAL CENTER | Age: 28
DRG: 871 | End: 2018-09-26
Attending: INTERNAL MEDICINE
Payer: COMMERCIAL

## 2018-09-26 LAB
ANION GAP SERPL CALC-SCNC: 8 MMOL/L (ref 0–11.9)
BASOPHILS # BLD AUTO: 0 % (ref 0–1.8)
BASOPHILS # BLD: 0 K/UL (ref 0–0.12)
BUN SERPL-MCNC: 13 MG/DL (ref 8–22)
CALCIUM SERPL-MCNC: 9 MG/DL (ref 8.5–10.5)
CHLORIDE SERPL-SCNC: 104 MMOL/L (ref 96–112)
CO2 SERPL-SCNC: 26 MMOL/L (ref 20–33)
CREAT SERPL-MCNC: 0.8 MG/DL (ref 0.5–1.4)
EOSINOPHIL # BLD AUTO: 0.23 K/UL (ref 0–0.51)
EOSINOPHIL NFR BLD: 0.9 % (ref 0–6.9)
ERYTHROCYTE [DISTWIDTH] IN BLOOD BY AUTOMATED COUNT: 41.3 FL (ref 35.9–50)
GLUCOSE SERPL-MCNC: 95 MG/DL (ref 65–99)
HCT VFR BLD AUTO: 36.8 % (ref 42–52)
HGB BLD-MCNC: 12.7 G/DL (ref 14–18)
LYMPHOCYTES # BLD AUTO: 3.73 K/UL (ref 1–4.8)
LYMPHOCYTES NFR BLD: 14.5 % (ref 22–41)
MAGNESIUM SERPL-MCNC: 1.8 MG/DL (ref 1.5–2.5)
MANUAL DIFF BLD: NORMAL
MCH RBC QN AUTO: 30 PG (ref 27–33)
MCHC RBC AUTO-ENTMCNC: 34.5 G/DL (ref 33.7–35.3)
MCV RBC AUTO: 86.8 FL (ref 81.4–97.8)
METAMYELOCYTES NFR BLD MANUAL: 6.4 %
MONOCYTES # BLD AUTO: 0.46 K/UL (ref 0–0.85)
MONOCYTES NFR BLD AUTO: 1.8 % (ref 0–13.4)
MORPHOLOGY BLD-IMP: NORMAL
MYELOCYTES NFR BLD MANUAL: 5.5 %
NEUTROPHILS # BLD AUTO: 18.22 K/UL (ref 1.82–7.42)
NEUTROPHILS NFR BLD: 70 % (ref 44–72)
NEUTS BAND NFR BLD MANUAL: 0.9 % (ref 0–10)
NRBC # BLD AUTO: 0.03 K/UL
NRBC BLD-RTO: 0.1 /100 WBC
PHOSPHATE SERPL-MCNC: 3.4 MG/DL (ref 2.5–4.5)
PLATELET # BLD AUTO: 234 K/UL (ref 164–446)
PLATELET BLD QL SMEAR: NORMAL
PMV BLD AUTO: 10.9 FL (ref 9–12.9)
POTASSIUM SERPL-SCNC: 3.3 MMOL/L (ref 3.6–5.5)
RBC # BLD AUTO: 4.24 M/UL (ref 4.7–6.1)
RBC BLD AUTO: NORMAL
SODIUM SERPL-SCNC: 138 MMOL/L (ref 135–145)
WBC # BLD AUTO: 25.7 K/UL (ref 4.8–10.8)

## 2018-09-26 PROCEDURE — 770006 HCHG ROOM/CARE - MED/SURG/GYN SEMI*

## 2018-09-26 PROCEDURE — 700102 HCHG RX REV CODE 250 W/ 637 OVERRIDE(OP): Performed by: STUDENT IN AN ORGANIZED HEALTH CARE EDUCATION/TRAINING PROGRAM

## 2018-09-26 PROCEDURE — 85027 COMPLETE CBC AUTOMATED: CPT

## 2018-09-26 PROCEDURE — A9270 NON-COVERED ITEM OR SERVICE: HCPCS | Performed by: INTERNAL MEDICINE

## 2018-09-26 PROCEDURE — A9270 NON-COVERED ITEM OR SERVICE: HCPCS | Performed by: HOSPITALIST

## 2018-09-26 PROCEDURE — 71045 X-RAY EXAM CHEST 1 VIEW: CPT

## 2018-09-26 PROCEDURE — 85007 BL SMEAR W/DIFF WBC COUNT: CPT

## 2018-09-26 PROCEDURE — A9270 NON-COVERED ITEM OR SERVICE: HCPCS | Performed by: STUDENT IN AN ORGANIZED HEALTH CARE EDUCATION/TRAINING PROGRAM

## 2018-09-26 PROCEDURE — 700102 HCHG RX REV CODE 250 W/ 637 OVERRIDE(OP): Performed by: INTERNAL MEDICINE

## 2018-09-26 PROCEDURE — 71250 CT THORAX DX C-: CPT

## 2018-09-26 PROCEDURE — 84100 ASSAY OF PHOSPHORUS: CPT

## 2018-09-26 PROCEDURE — 700102 HCHG RX REV CODE 250 W/ 637 OVERRIDE(OP): Performed by: HOSPITALIST

## 2018-09-26 PROCEDURE — 99232 SBSQ HOSP IP/OBS MODERATE 35: CPT | Performed by: INTERNAL MEDICINE

## 2018-09-26 PROCEDURE — 36415 COLL VENOUS BLD VENIPUNCTURE: CPT

## 2018-09-26 PROCEDURE — 83735 ASSAY OF MAGNESIUM: CPT

## 2018-09-26 PROCEDURE — 80048 BASIC METABOLIC PNL TOTAL CA: CPT

## 2018-09-26 RX ORDER — POTASSIUM CHLORIDE 20 MEQ/1
20 TABLET, EXTENDED RELEASE ORAL 2 TIMES DAILY
Status: COMPLETED | OUTPATIENT
Start: 2018-09-26 | End: 2018-09-26

## 2018-09-26 RX ORDER — LEVOFLOXACIN 750 MG/1
750 TABLET, FILM COATED ORAL DAILY
Status: DISCONTINUED | OUTPATIENT
Start: 2018-09-26 | End: 2018-09-27 | Stop reason: HOSPADM

## 2018-09-26 RX ADMIN — NICOTINE 7 MG: 7 PATCH, EXTENDED RELEASE TRANSDERMAL at 05:43

## 2018-09-26 RX ADMIN — FAMOTIDINE 20 MG: 20 TABLET ORAL at 05:43

## 2018-09-26 RX ADMIN — LEVOFLOXACIN 750 MG: 750 TABLET, FILM COATED ORAL at 10:56

## 2018-09-26 RX ADMIN — AMOXICILLIN 500 MG: 500 CAPSULE ORAL at 05:44

## 2018-09-26 RX ADMIN — POTASSIUM CHLORIDE 20 MEQ: 1500 TABLET, EXTENDED RELEASE ORAL at 08:50

## 2018-09-26 RX ADMIN — POTASSIUM CHLORIDE 20 MEQ: 1500 TABLET, EXTENDED RELEASE ORAL at 16:51

## 2018-09-26 RX ADMIN — FAMOTIDINE 20 MG: 20 TABLET ORAL at 16:51

## 2018-09-26 ASSESSMENT — ENCOUNTER SYMPTOMS
COUGH: 1
MYALGIAS: 0
ABDOMINAL PAIN: 0
SPUTUM PRODUCTION: 1
NAUSEA: 0
FEVER: 0
SPEECH CHANGE: 0
VOMITING: 0
SENSORY CHANGE: 0

## 2018-09-26 ASSESSMENT — PAIN SCALES - GENERAL
PAINLEVEL_OUTOF10: 0

## 2018-09-26 NOTE — CARE PLAN
Problem: Knowledge Deficit  Goal: Knowledge of disease process/condition, treatment plan, diagnostic tests, and medications will improve  Outcome: PROGRESSING AS EXPECTED  Pt questions answered regarding plan of care, antibiotics and discharge plan.     Problem: Respiratory:  Goal: Respiratory status will improve  Outcome: PROGRESSING AS EXPECTED  IS encouraged and teaching/coaching effective. Pt up in chair, and pt educated importance of getting OOB x3 for mobility and meals.

## 2018-09-26 NOTE — PROGRESS NOTES
Pulmonary Progress Note    PATIENT: Gabriel Lemos  MRN: [id number]  : 1990    Admission date: 2018    ASSESSMENT/PLAN:  Active Problems:    Pneumonia POA: Yes    Pneumomediastinum (HCC) POA: Unknown    N&V (nausea and vomiting) POA: Unknown    Barium swallow nl      28 y/o man admitted with pneumonia and pneumomediastinum secondary to vomiting.  Improving rapidly on rocephin.  Grew S pneumo from blood.    Wbc elevated; procalcitonin  5.90.  Will check CT noncontrast to be sure he has not loculated fluid collections accounting for this.    SUBJECTIVE:  Feeling better    MEDICATIONS:    Current Facility-Administered Medications:   •  potassium chloride SA (Kdur) tablet 20 mEq, 20 mEq, Oral, BID, Melodie Molina M.D., 20 mEq at 18 0850  •  levoFLOXacin (LEVAQUIN) tablet 750 mg, 750 mg, Oral, DAILY, Melodie Molina M.D., 750 mg at 18 1056  •  ipratropium-albuterol (DUONEB) nebulizer solution, 3 mL, Nebulization, Q4H PRN (RT), Marysol Islas M.D.  •  senna-docusate (PERICOLACE or SENOKOT S) 8.6-50 MG per tablet 2 Tab, 2 Tab, Oral, BID, Stopped at 18 0600 **AND** polyethylene glycol/lytes (MIRALAX) PACKET 1 Packet, 1 Packet, Oral, QDAY PRN **AND** magnesium hydroxide (MILK OF MAGNESIA) suspension 30 mL, 30 mL, Oral, QDAY PRN **AND** bisacodyl (DULCOLAX) suppository 10 mg, 10 mg, Rectal, QDAY PRN, Carrington Harden M.D.  •  Respiratory Care per Protocol, , Nebulization, Continuous RT, Carrington Harden M.D.  •  acetaminophen (TYLENOL) tablet 650 mg, 650 mg, Oral, Q6HRS PRN, Carrington Harden M.D., 650 mg at 18 0714  •  Notify provider if pain remains uncontrolled, , , CONTINUOUS **AND** Use the numeric rating scale (NRS-11) on regular floors and Critical-Care Pain Observation Tool (CPOT) on ICUs/Trauma to assess pain, , , CONTINUOUS **AND** Pulse Ox (Oximetry), , , CONTINUOUS **AND** Pharmacy Consult Request ...Pain Management Review, , Other, PRN **AND** If patient  "difficult to arouse and/or has respiratory depression, stop any opiates that are currently infusing and call a Rapid Response., , , CONTINUOUS **AND** oxyCODONE immediate-release (ROXICODONE) tablet 2.5 mg, 2.5 mg, Oral, Q3HRS PRN, 2.5 mg at 09/23/18 1951 **AND** oxyCODONE immediate-release (ROXICODONE) tablet 5 mg, 5 mg, Oral, Q3HRS PRN, 5 mg at 09/25/18 1539 **AND** morphine (pf) 4 mg/ml injection 2 mg, 2 mg, Intravenous, Q3HRS PRN, Carrington Harden M.D., 2 mg at 09/23/18 1008  •  ondansetron (ZOFRAN) syringe/vial injection 4 mg, 4 mg, Intravenous, Q4HRS PRN, Carrington Harden M.D.  •  ondansetron (ZOFRAN ODT) dispertab 4 mg, 4 mg, Oral, Q4HRS PRN, Carrington Harden M.D.  •  promethazine (PHENERGAN) tablet 12.5-25 mg, 12.5-25 mg, Oral, Q4HRS PRN, Carrington Harden M.D.  •  promethazine (PHENERGAN) suppository 12.5-25 mg, 12.5-25 mg, Rectal, Q4HRS PRN, Carrington Harden M.D.  •  prochlorperazine (COMPAZINE) injection 5-10 mg, 5-10 mg, Intravenous, Q4HRS PRN, Carrington Harden M.D.  •  famotidine (PEPCID) tablet 20 mg, 20 mg, Oral, BID, 20 mg at 09/26/18 0543 **OR** [DISCONTINUED] famotidine (PEPCID) injection 20 mg, 20 mg, Intravenous, BID, Carrington Harden M.D., 20 mg at 09/25/18 0553  •  nicotine (NICODERM) 7 MG/24HR 7 mg, 7 mg, Transdermal, Daily-0600, Riki Osorio M.D., 7 mg at 09/26/18 0543  •  heparin injection 5,000 Units, 5,000 Units, Subcutaneous, Q8HRS, Riki Osorio M.D., 5,000 Units at 09/24/18 2691    OBJECTIVE:    /79   Pulse 76   Temp 36.7 °C (98.1 °F)   Resp 16   Ht 1.651 m (5' 5\")   Wt 58.5 kg (128 lb 15.5 oz)   SpO2 95%   BMI 21.46 kg/m²   General: alert, conversant in full sentences  HEENT: Sclera clear, conjunctiva pink, oropharynx clear, mucus membranes moist  Heart: regular  Lungs: rales left posteriorly with dullness on percussion  Abdomen: soft  Extremities: no clubbing cyanosis or edema  Neuro: intact  Skin: intact    DATA REVIEW:  LABORATORY DATA:  "   Recent Labs      09/24/18   0502  09/25/18   0211  09/26/18   0157   WBC  29.0*  24.2*  25.7*   RBC  4.64*  3.79*  4.24*   HEMOGLOBIN  14.2  11.7*  12.7*   HEMATOCRIT  39.2*  32.4*  36.8*   MCV  84.5  85.5  86.8   MCH  30.6  30.9  30.0   MCHC  36.2*  36.1*  34.5   RDW  38.6  39.9  41.3   PLATELETCT  207  199  234   MPV  10.8  11.0  10.9     Recent Labs      09/24/18   0502  09/26/18   0157   SODIUM  139  138   POTASSIUM  4.0  3.3*   CHLORIDE  105  104   CO2  27  26   GLUCOSE  148*  95   BUN  15  13   CREATININE  0.82  0.80   CALCIUM  9.2  9.0             Lab Results  Component Value Date/Time   CHVUU74Z 7.45 09/22/2018 1658   [id number] 38.3 (H) 09/22/2018 1658   [id number] 46.6 (LL) 09/22/2018 1658   [id number] 82.8 (L) 09/22/2018 1658   ARTHCO3 26 (H) 09/22/2018 1658   ARTBE 2 09/22/2018 1658   BDYTEMP see below 09/22/2018 1658      Cultures strep pneumonia from blood     IMAGING:   CXR (personally reviewed) - infiltrate left dense      Assessment and plan as above  Time 35 minutes    sOkar Young M.D.

## 2018-09-26 NOTE — PROGRESS NOTES
[facility] [place]     Attending: Dr. Marysol Islas  Senior Resident: David Zuniga (PGY-2)  Zay Resident: Melodie Molina (PGY-1)  PATIENT: Gabriel Leoms; [id number]; 1990    ID: 27 y.o. male admitted for sepsis and bacteremia, growing strep pneumoniae x 2 on blood cultures on admission. 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 Switched to oral amoxicillin yesterday night after having been on unasyn/azithro/vanco on admission and transitioned to ceftriaxone for 2 days. Day 4 of abx coverage.     SUBJECTIVE: No acute events overnight. Patient feels much better. Patient has been walking around, drinking lots of fluids, would like to go home but is okay with staying an additional day if he needs to.     OBJECTIVE:     Vitals:    09/25/18 1520 09/25/18 1630 09/25/18 1905 09/25/18 1913   BP:  132/89 113/74    Pulse: 72 84 87 91   Resp: 18 17 18 16   Temp:  36.8 °C (98.3 °F) 36.7 °C (98.1 °F)    TempSrc:       SpO2: 95% 95% 93% 93%   Weight:       Height:           Intake/Output Summary (Last 24 hours) at 09/26/18 0531  Last data filed at 09/25/18 1300   Gross per 24 hour   Intake              700 ml   Output             1300 ml   Net             -600 ml       PE:  General: Young, thin -American male resting in no acute distress, pleasant, cooperative   Skin:  Pink, warm and dry.  No rashes  HEENT: NC/AT. PERRL. EOMI. No nasal discharge. Oropharynx nonerythematous without exudate/plaques. Moist mucus membranes  Neck: With lymphadenopathy. No rigidity. No JVD   Lungs:  Symmetrical.  Not clear to ascultation - poor aeration throughout BL lungs (L worse than R)   CTAB with no W/R/R.  Good air movement   Cardiovascular:  S1/S2 RRR without M/R/G.  Abdomen:  Abdomen is soft NT/ND. +BS. No masses noted.   Extremities:  Full range of motion. No gross deformities noted. 2+ pulses in all extremities. No C/C/E   Spine:  Straight without  vertebral anomalies.  CNS:  Muscle tone is normal. Cranial nerves II-XII grossly intact.     LABS:  Recent Labs      09/24/18   0502  09/25/18   0211  09/26/18   0157   WBC  29.0*  24.2*  25.7*   RBC  4.64*  3.79*  4.24*   HEMOGLOBIN  14.2  11.7*  12.7*   HEMATOCRIT  39.2*  32.4*  36.8*   MCV  84.5  85.5  86.8   MCH  30.6  30.9  30.0   RDW  38.6  39.9  41.3   PLATELETCT  207  199  234   MPV  10.8  11.0  10.9   NEUTSPOLYS  79.80*  80.20*  70.00   LYMPHOCYTES  8.80*  9.00*  14.50*   MONOCYTES  5.30  4.50  1.80   EOSINOPHILS  0.00  0.00  0.90   BASOPHILS  0.00  0.00  0.00   RBCMORPHOLO  Present  Normal  Normal     Recent Labs      09/23/18   2241  09/24/18   0502  09/26/18   0157   SODIUM   --   139  138   POTASSIUM   --   4.0  3.3*   CHLORIDE   --   105  104   CO2   --   27  26   BUN   --   15  13   CREATININE   --   0.82  0.80   CALCIUM   --   9.2  9.0   MAGNESIUM  2.6*   --   1.8   PHOSPHORUS  2.4*   --   3.4     Estimated GFR/CRCL = Estimated Creatinine Clearance: 114.8 mL/min (by C-G formula based on SCr of 0.8 mg/dL).  Recent Labs      09/24/18   0502  09/26/18   0157   GLUCOSE  148*  95                   Invalid input(s): [id number]  No results for input(s): INR, APTT, FIBRINOGEN in the last 72 hours.    Invalid input(s): DIMER    MICROBIOLOGY: 2 cultures on 9/23/18 were negative     IMAGING:   CXR yesterday: Impression       1.  Minimal pneumomediastinum, unchanged.  2.  Stable left pulmonary consolidation.         MEDS:  Current Facility-Administered Medications   Medication Last Dose   • 0.45% NaCl with KCl 20 mEq infusion     • amoxicillin (AMOXIL) capsule 500 mg 500 mg at 09/25/18 2112   • ipratropium-albuterol (DUONEB) nebulizer solution     • senna-docusate (PERICOLACE or SENOKOT S) 8.6-50 MG per tablet 2 Tab Stopped at 09/23/18 0600    And   • polyethylene glycol/lytes (MIRALAX) PACKET 1 Packet      And   • magnesium hydroxide (MILK OF MAGNESIA) suspension 30 mL      And   • bisacodyl (DULCOLAX)  suppository 10 mg     • Respiratory Care per Protocol     • acetaminophen (TYLENOL) tablet 650 mg 650 mg at 09/24/18 0714   • Pharmacy Consult Request ...Pain Management Review      And   • oxyCODONE immediate-release (ROXICODONE) tablet 2.5 mg 2.5 mg at 09/23/18 1951    And   • oxyCODONE immediate-release (ROXICODONE) tablet 5 mg 5 mg at 09/25/18 1539    And   • morphine (pf) 4 mg/ml injection 2 mg 2 mg at 09/23/18 1008   • ondansetron (ZOFRAN) syringe/vial injection 4 mg     • ondansetron (ZOFRAN ODT) dispertab 4 mg     • promethazine (PHENERGAN) tablet 12.5-25 mg     • promethazine (PHENERGAN) suppository 12.5-25 mg     • prochlorperazine (COMPAZINE) injection 5-10 mg     • famotidine (PEPCID) tablet 20 mg 20 mg at 09/25/18 1711   • nicotine (NICODERM) 7 MG/24HR 7 mg 7 mg at 09/25/18 0554   • heparin injection 5,000 Units 5,000 Units at 09/24/18 2116       PROBLEM LIST:  No problems updated.    ASSESSMENT/PLAN: 27 y.o. male admitted for sepsis and bacteremia, growing strep pneumoniae x 2 on blood cultures. Being treated for community acquired pneumonia, transitioning to oral Levaquin today. Patient clinically improving greatly, will likely monitor for 1 more day.      # Community acquired PNA growing strep pneumoniae on 2 peripheral blood cultures  # Complicated by sepsis and bacteremia: resolved, last 2 Blood cxs negative  # Hypoxic respiratory failure requiring high flow oxygen in ICU - resolved   Improving, continues on RA. Started on amoxicillin 500 mg TID last night (9/25/18) at 21:00.   Total Day 4 of abx coverage (previously on unasyn/azithro/vanc), transitioned to ceftriaxone. Will transition to oral Levaquin 750 mg per IDs recommendation.   HIV non-reactive.   Patient feeling much better and looks clinically improved. Likely home tomorrow   - 9/26/18: Day 4 abx, switched to Levaquin 750 mg qmorning - will continue for 10 more days    - Titrate O2 as needed  - Pulmonology recommended a bronch if patient  decompensates  - Continue use of incentive spirometer 10 times a day  - Repeat CBC tomorrow am        # Pneumomediastinum likely from an esophageal tear 2/2 nausea / vomiting   No obvious leak seen on esophagram.   Improving: CXR on 9/25/18 showed minimal pneumodediastinum, unchanged.  Nausea and vomiting improving.  GI dropped note yesterday 9/25/18 indicating no need for endoscopy: recommend soft diet for next 1-2 weeks and acid suppression therapy for a month. Signed off, FU with [facility] after discharge.   - Daily CXR to monitor pneumomediastinum  - Gastroenterology recommends no endoscopy  - GI recommends soft diet for 1-2 weeks, continue soft diet    - GI recommends acid suppression for 1 month, continue famotidine BID    - Zofran 4 mg PRN and compazine 5-10 mg q4hrs as needed for nausea   - Patient to FU with [facility] after discharge to ensure no issues     # Hypokalemia: K+ 3.3 today, down from 4.0 yesterday  # likely 2/2 diarrhea and poor PO   - Oral repletion as needed   - Encourage good PO     # Diarrhea: improving   # likely 2/2 sepsis and PNA  - DC fluids because they were 'burning him' at the IV site and he would like to PO as much as he can   - Continue to monitor      #FEN/GI  - Famotidine 20 mg BID      Disposition: Likely tomorrow if patient continues to improve as dramatically as he did in the last 24 hours.      # Core Measures   VTE PPx: Heparin 5,000 units   Abx: Levaquin 750 mg qday    Fluids: None, encouraging good PO   Lines and Tube: PIV   Diet: Thin liquids, advance to soft diet   Code Status: Full Code        Melodie Molina MD  PGY-1   [facility]

## 2018-09-26 NOTE — PROGRESS NOTES
Gastroenterology Progress Note     Author: Riki Blevins   Date & Time Created: 9/25/2018 5:46 PM    Chief Complaint:  N/V, mediastinal air, shortness of breath, chest pain    Interval History:  Pt reports that he continues to be weak but breathing better. Not walking much yet    Review of Systems:  Review of Systems   Constitutional: Negative for chills and fever.   Respiratory: Positive for cough and sputum production. Negative for shortness of breath.    Cardiovascular: Negative for chest pain.   Gastrointestinal: Negative for abdominal pain, constipation, diarrhea, heartburn, nausea and vomiting.       Physical Exam:  Physical Exam   Constitutional: No distress.   Cardiovascular: Normal rate and regular rhythm.    Pulmonary/Chest: Effort normal and breath sounds normal. No respiratory distress.   Abdominal: Soft. Bowel sounds are normal. He exhibits no distension. There is no tenderness.       Labs:        Invalid input(s): [id number]      Recent Labs      09/22/18 2009 09/23/18 0306 09/23/18 2241 09/24/18   0502   SODIUM  136  139   --   139   POTASSIUM  4.1  3.9   --   4.0   CHLORIDE  103  106   --   105   CO2  25  25   --   27   BUN  13  13   --   15   CREATININE  0.95  0.90   --   0.82   MAGNESIUM   --    --   2.6*   --    PHOSPHORUS   --    --   2.4*   --    CALCIUM  8.1*  8.6   --   9.2     Recent Labs      09/22/18 2009 09/23/18 0306 09/24/18   0502   ALTSGPT  11  10   --    ASTSGOT  26  20   --    ALKPHOSPHAT  55  56   --    TBILIRUBIN  1.0  1.0   --    GLUCOSE  100*  108*  148*     Recent Labs      09/23/18 0306 09/24/18   0502  09/25/18   0211   RBC  4.49*  4.64*  3.79*   HEMOGLOBIN  13.9*  14.2  11.7*   HEMATOCRIT  37.6*  39.2*  32.4*   PLATELETCT  174  207  199     Recent Labs      09/22/18 2009 09/23/18 0306 09/24/18   0502  09/25/18   0211   WBC   --   10.5  29.0*  24.2*   NEUTSPOLYS   --   75.90*  79.80*  80.20*   LYMPHOCYTES   --   14.60*  8.80*  9.00*   MONOCYTES    --   4.30  5.30  4.50   EOSINOPHILS   --   0.00  0.00  0.00   BASOPHILS   --   0.00  0.00  0.00   ASTSGOT  26  20   --    --    ALTSGPT  11  10   --    --    ALKPHOSPHAT  55  56   --    --    TBILIRUBIN  1.0  1.0   --    --      Hemodynamics:  Temp (24hrs), Av.8 °C (98.2 °F), Min:36.6 °C (97.8 °F), Max:37 °C (98.6 °F)  Temperature: 36.8 °C (98.3 °F)  Pulse  Av.6  Min: 51  Max: 143  Blood Pressure: 132/89     Respiratory:    Respiration: 17, Pulse Oximetry: 95 %, O2 Daily Delivery Respiratory : Room Air with O2 Available     Work Of Breathing / Effort: Mild  RUL Breath Sounds: Clear, RML Breath Sounds: Clear, RLL Breath Sounds: Diminished, PIETER Breath Sounds: Clear, LLL Breath Sounds: Diminished  Fluids:    Intake/Output Summary (Last 24 hours) at 18 1746  Last data filed at 18 1300   Gross per 24 hour   Intake              700 ml   Output             1300 ml   Net             -600 ml        GI/Nutrition:  Orders Placed This Encounter   Procedures   • Diet Order Regular     Standing Status:   Standing     Number of Occurrences:   1     Order Specific Question:   Diet:     Answer:   Regular [1]     Medical Decision Making, by Problem:  Active Hospital Problems    Diagnosis   • Esophageal rupture [K22.3]   • Tobacco abuse [Z72.0]   • Acute respiratory failure with hypoxia (HCC) [J96.01]   • Pneumonia [J18.9]   • Pneumomediastinum (HCC) [J98.2]   • N&V (nausea and vomiting) [R11.2]   • Sepsis (HCC) [A41.9]       Plan:  N/v leading to MWT and free air in the mediastinum. No obvious leak at esophagram. Doing better. No need for endoscopy at this point. Recommend soft diet for the next 1-2 weeks. Acid suppression therapy for one month. Pt can f/u with [facility] after discharge to ensure no other issues occur from the esophageal standpoint. Please call if new questions. [facility] will sign off    Quality-Core Measures

## 2018-09-26 NOTE — NON-PROVIDER
DO NOT USE - THIS IS AN ACCIDENTAL, INCOMPLETE VERSION OF ANOTHER MODIFIED NOTE.    Plan:  NEURO: Wean off narcotic pain meds. Allow pt to take OxyCODONE tonight if in severe pain, but d/c before discharge and utilize Tylenol.  CV: Encourage proper hydration to maintain blood pressure.  PULM: Cont IS usage and encourage deep-breathing exercises.  GI: Cont Famotidine (1 month), per GI consult. Monitor for diarrhea.  HEME/ID: d/c PO Amoxicillin. Begin PO Levaquin, 750 mg qd (14 days), per ID.  PROPHY: d/c SQ heparin. Encourage oob ambulation.

## 2018-09-26 NOTE — PROGRESS NOTES
Assumed care at 0700, report received from NOC RN.  Pt A&O x 4, states pain is 0/10.  Bed locked, 2 rails up, bed in lowest position. Call light in place, belongings at bedside, no needs at this time and hourly rounding in place.  Pt encouraged to use IS, upon first attempts his volume was only 500, pt encouraged and coached. RT came and did IS w pt and volume increased. Pt educated to continue to do IS throughout the day, pt encouraged OOB for all meals. Pt's plan of care discussed, all questions answered at this time.

## 2018-09-26 NOTE — CARE PLAN
Problem: Hyperinflation:  Goal: Prevent or improve atelectasis  Outcome: PROGRESSING AS EXPECTED    Intervention: Instruct incentive spirometry usage  Pt pulling 1500 60% is 1680

## 2018-09-26 NOTE — PROGRESS NOTES
Assumed care of pt after report, pt resting in bed AAOx4, no co of pain at the moment, discussed POC w/ pt, fluids running. Fall measures in place. Call light within reach, personal belongings close-by, bed in lowest position, IV pole on same side of bathroom, upper bed rails up, hourly rounding in place. Will continue to monitor pt for safety.

## 2018-09-26 NOTE — PROGRESS NOTES
Infectious Disease Progress Note    Author: Freda Fang M.D. Date & Time of service: 2018  10:13 AM    Chief Complaint:  Sepsis     Interval History:  - no fevers. Feels much better. WBC 25.7  Labs Reviewed, Medications Reviewed and Radiology Reviewed.    Review of Systems:  Review of Systems   Constitutional: Positive for malaise/fatigue. Negative for fever.        Feels much better   HENT: Negative for hearing loss.    Respiratory: Positive for cough and sputum production.         Much improved   Cardiovascular: Negative for chest pain.   Gastrointestinal: Negative for abdominal pain, nausea and vomiting.   Genitourinary: Negative for dysuria.   Musculoskeletal: Negative for myalgias.   Neurological: Negative for sensory change and speech change.       Hemodynamics:  Temp (24hrs), Av.6 °C (97.9 °F), Min:36.1 °C (97 °F), Max:36.8 °C (98.3 °F)  Temperature: 36.7 °C (98.1 °F)  Pulse  Av  Min: 51  Max: 143  Blood Pressure: 122/79       Physical Exam:  Physical Exam   Constitutional: He is oriented to person, place, and time. No distress.   HENT:   Mouth/Throat: No oropharyngeal exudate.   Eyes: No scleral icterus.   Neck: Neck supple.   Cardiovascular: Regular rhythm.    No murmur heard.  Pulmonary/Chest: He has rales.   Abdominal: There is no tenderness. There is no rebound.   Musculoskeletal: He exhibits no edema.   Neurological: He is alert and oriented to person, place, and time.   Vitals reviewed.      Meds:    Current Facility-Administered Medications:   •  potassium chloride SA  •  levoFLOXacin  •  ipratropium-albuterol  •  senna-docusate **AND** polyethylene glycol/lytes **AND** magnesium hydroxide **AND** bisacodyl  •  Respiratory Care per Protocol  •  acetaminophen  •  Notify provider if pain remains uncontrolled **AND** Use the numeric rating scale (NRS-11) on regular floors and Critical-Care Pain Observation Tool (CPOT) on ICUs/Trauma to assess pain **AND** Pulse Ox (Oximetry)  **AND** Pharmacy Consult Request **AND** If patient difficult to arouse and/or has respiratory depression, stop any opiates that are currently infusing and call a Rapid Response. **AND** oxyCODONE immediate-release **AND** oxyCODONE immediate-release **AND** morphine injection  •  ondansetron  •  ondansetron  •  promethazine  •  promethazine  •  prochlorperazine  •  famotidine **OR** [DISCONTINUED] famotidine  •  nicotine  •  heparin    Labs:  Recent Labs      09/24/18   0502  09/25/18   0211  09/26/18   0157   WBC  29.0*  24.2*  25.7*   RBC  4.64*  3.79*  4.24*   HEMOGLOBIN  14.2  11.7*  12.7*   HEMATOCRIT  39.2*  32.4*  36.8*   MCV  84.5  85.5  86.8   MCH  30.6  30.9  30.0   RDW  38.6  39.9  41.3   PLATELETCT  207  199  234   MPV  10.8  11.0  10.9   NEUTSPOLYS  79.80*  80.20*  70.00   LYMPHOCYTES  8.80*  9.00*  14.50*   MONOCYTES  5.30  4.50  1.80   EOSINOPHILS  0.00  0.00  0.90   BASOPHILS  0.00  0.00  0.00   RBCMORPHOLO  Present  Normal  Normal     Recent Labs      09/24/18   0502  09/26/18   0157   SODIUM  139  138   POTASSIUM  4.0  3.3*   CHLORIDE  105  104   CO2  27  26   GLUCOSE  148*  95   BUN  15  13     Recent Labs      09/24/18   0502  09/26/18   0157   CREATININE  0.82  0.80       Imaging:  Ct-chest (thorax) With    Result Date: 9/22/2018 9/22/2018 2:04 PM HISTORY/REASON FOR EXAM:  Thoracic Pain. Pneumomediastinum.  Extensive pneumonia. TECHNIQUE/EXAM DESCRIPTION: CT scan of the chest with contrast. Thin-section helical images were obtained from the lung apices through the adrenal glands following the bolus administration of contrast. 75 mL of Omnipaque 350 nonionic contrast was utilized. Low dose optimization technique was utilized for this CT exam including automated exposure control and adjustment of the mA and/or kV according to patient size. COMPARISON:  Chest x-ray 9/22/2018 FINDINGS: Thoracic aorta is normal in caliber. Pneumomediastinum present tracking into the lower neck and retroclavicular  soft tissues, primarily on the RIGHT. Extensive consolidation of the LEFT upper and lower lobes with air bronchograms present. Multiple foci of ill-defined opacity in the RIGHT upper and lower lobes. No major bony abnormality is seen. Contrast present within the stomach and bowel from recent esophagram. No extravasation of contrast into the mediastinum.     1.  Pneumomediastinum as seen on recent chest x-ray. 2.  Extensive multifocal pneumonia particularly involving the LEFT upper and lower lobes.  Multifocal ill-defined parenchymal opacities in the RIGHT lung.  Follow-up recommended to resolution to exclude underlying process. 3.  No pneumothorax demonstrated. 4.  No extravasation of oral contrast into the mediastinum.     Dx-chest-limited (1 View)    Result Date: 9/24/2018 9/24/2018 8:15 AM HISTORY/REASON FOR EXAM:  Shortness of Breath TECHNIQUE/EXAM DESCRIPTION AND NUMBER OF VIEWS: Single portable view of the chest. COMPARISON: 9/23/2018 FINDINGS: Slightly less conspicuous pneumomediastinum. Extensive airspace opacities in the left mid and lower lung. No pleural effusion. No appreciable pneumothorax. Stable cardiopericardial silhouette.     Slightly less conspicuous pneumomediastinum.    Dx-chest-portable (1 View)    Result Date: 9/26/2018 9/26/2018 7:40 AM HISTORY/REASON FOR EXAM:  Shortness of Breath. Pneumonia. Pneumomediastinum TECHNIQUE/EXAM DESCRIPTION AND NUMBER OF VIEWS: Single portable view of the chest. COMPARISON: Yesterday FINDINGS: There is once again evidence of pneumomediastinum. There is dense consolidation in the left upper lobe and left lower lobe as well as mild multifocal consolidation in the right lung. No pneumothorax. No definite effusion.     Persistent multifocal consolidation/pneumonia primarily affecting the left lung. Pneumomediastinum.    Dx-chest-portable (1 View)    Result Date: 9/25/2018 9/25/2018 8:29 AM HISTORY/REASON FOR EXAM:  Shortness of Breath. TECHNIQUE/EXAM DESCRIPTION  AND NUMBER OF VIEWS: Single portable view of the chest. COMPARISON: 9/24/2018 FINDINGS: LUNGS: Stable consolidation in the left lung. Stable elevation of the left hemidiaphragm. Right lung is clear. PNEUMOTHORAX: None. LINES AND TUBES: None. MEDIASTINUM: No cardiomegaly. Minimal pneumomediastinum, unchanged. MUSCULOSKELETAL STRUCTURES: No acute fracture.     1.  Minimal pneumomediastinum, unchanged. 2.  Stable left pulmonary consolidation.    Dx-chest-portable (1 View)    Result Date: 9/23/2018 9/23/2018 3:40 AM HISTORY/REASON FOR EXAM:  Cough TECHNIQUE/EXAM DESCRIPTION AND NUMBER OF VIEWS: Single portable view of the chest. COMPARISON: 9/22/2018 FINDINGS: Redemonstration of pneumomediastinum. Extensive airspace opacities in the left mid and lower lung. No pleural effusion. No appreciable pneumothorax. Stable cardiopericardial silhouette.     1. No significant interval change.    Dx-chest-portable (1 View)    Result Date: 9/22/2018 9/22/2018 10:31 AM HISTORY/REASON FOR EXAM:  Cough. Shortness of breath, LEFT flank pain, vomiting. TECHNIQUE/EXAM DESCRIPTION AND NUMBER OF VIEWS: Single portable view of the chest. COMPARISON: None FINDINGS: Cardiomediastinal contour is within normal limits. Lucency consistent with pneumomediastinum. Ill-defined extensive parenchymal opacity in the LEFT mid and lower lung. Minimal opacity in the RIGHT midlung. Possible small RIGHT apical pneumothorax. Subcutaneous gas in the RIGHT supraclavicular region. No major bony abnormality is seen.     1.  Pneumomediastinum and possible tiny RIGHT apical pneumothorax. 2.  Extensive LEFT mid and lower lung consolidation, likely pneumonia. 3.  Possible minimal RIGHT midlung infiltrate. 4.  This constellation of findings is concerning for esophageal perforation. These findings were discussed with SOFIA CASEY on 9/22/2018 11:03 AM.     Dx-eskavita. Fluoro (barium Swallow)    Result Date: 9/22/2018 9/22/2018 12:27 PM HISTORY/REASON FOR EXAM:   "Abdominal Pain. Vomiting, pneumomediastinum. TECHNIQUE/EXAM DESCRIPTION AND NUMBER OF VIEWS: Water-soluble esophagram procedure was performed. 20 fluoroscopic images obtained. Fluoroscopy time: 0.6 minutes COMPARISON:  Chest x-ray 9/22/2018 FINDINGS: Initially water-soluble contrast was administered orally.  Esophagus is normal in caliber, without evidence for extravasation. Subsequent to the patient was given oral barium, again showing normal caliber esophagus.     Normal caliber esophagus without evidence for esophageal perforation.      Micro:  Results     Procedure Component Value Units Date/Time    BLOOD CULTURE [[id number]]  (Abnormal) Collected:  09/22/18 1407    Order Status:  Completed Specimen:  Blood from Peripheral Updated:  09/25/18 0822     Significant Indicator POS (POS)     Source BLD     Site PERIPHERAL     Blood Culture Growth detected by Bactec instrument. 09/23/2018  08:57 (A)      Streptococcus pneumoniae  See previous culture for sensitivity report.   (A)    Narrative:       CALL  [place]  161 tel. [phone],  Per Hospital Policy: Only change Specimen Src: to \"Line\" if  specified by physician order.    BLOOD CULTURE [[id number]]  (Abnormal)  (Susceptibility) Collected:  09/22/18 1407    Order Status:  Completed Specimen:  Blood from Peripheral Updated:  09/25/18 0820     Significant Indicator POS (POS)     Source BLD     Site PERIPHERAL     Blood Culture Growth detected by Bactec instrument. 09/23/2018  08:54 (A)      Streptococcus pneumoniae (A)    Narrative:       CALL  [place]  161 tel. [phone],  Per Hospital Policy: Only change Specimen Src: to \"Line\" if  specified by physician order.    Culture & Susceptibility     STREPTOCOCCUS PNEUMONIAE     Antibiotic Sensitivity Microscan Unit Status    Cefotaxime-meningitis Sensitive .012 mcg/mL Final    Method: SENSITIVITY, E-TEST    Cefotaxime-non meningitis Sensitive .012 mcg/mL Final    Method: SENSITIVITY, E-TEST    Erythromycin Sensitive .125 mcg/mL " "Final    Method: SENSITIVITY, E-TEST    Penicillin non-meningitis Sensitive .016 mcg/mL Final    Method: SENSITIVITY, E-TEST    Penicillin-meningitis Sensitive .016 mcg/mL Final    Method: SENSITIVITY, E-TEST                       SENSITIVITY, E-TEST [[id number]] Collected:  09/22/18 1407    Order Status:  Completed Specimen:  Blood Updated:  09/24/18 1054     ETEST Sensitivity INTERIM    Narrative:       161 tel. [phone] 09/23/2018, 08:56, RB PERF. RESULTS CALLED TO:VIET, RN  Per Hospital Policy: Only change Specimen Src: to \"Line\" if  specified by physician order.    BLOOD CULTURE [[id number]] Collected:  09/23/18 1425    Order Status:  Completed Specimen:  Blood from Peripheral Updated:  09/24/18 0844     Significant Indicator NEG     Source BLD     Site PERIPHERAL     Blood Culture No Growth    Note: Blood cultures are incubated for 5 days and  are monitored continuously.Positive blood cultures  are called to the RN and reported as soon as  they are identified.      Narrative:       Collected By:[id number] WIL ALONZO  Per Hospital Policy: Only change Specimen Src: to \"Line\" if  specified by physician order.    BLOOD CULTURE [[id number]] Collected:  09/23/18 1429    Order Status:  Completed Specimen:  Blood from Peripheral Updated:  09/24/18 0844     Significant Indicator NEG     Source BLD     Site PERIPHERAL     Blood Culture No Growth    Note: Blood cultures are incubated for 5 days and  are monitored continuously.Positive blood cultures  are called to the RN and reported as soon as  they are identified.      Narrative:       Collected By:[id number] WIL ALONZO  X2  Per Hospital Policy: Only change Specimen Src: to \"Line\" if  specified by physician order.    MRSA BY PCR (AMP) [[id number]] Collected:  09/23/18 1004    Order Status:  Completed Specimen:  Respirate from Nares Updated:  09/23/18 1452     Significant Indicator NEG     Source RESP     Site NARES     MRSA PCR Negative for MRSA by PCR.    " Narrative:       Collected By:[id number] WIL ALONZO    BLOOD CULTURE [[id number]]     Order Status:  Canceled Specimen:  Blood from Peripheral     MRSA BY PCR (AMP) [[id number]]     Order Status:  Canceled Specimen:  Respirate from Nares     GRAM STAIN [[id number]] Collected:  09/22/18 1605    Order Status:  Completed Specimen:  Respirate Updated:  09/23/18 1335     Significant Indicator .     Source RESP     Site SPUTUM     Gram Stain Result Sputum Gram stain quality score is <1, probable  oropharyngeal contamination. Culture not performed.  Recollect if clinically indicated.      Narrative:       Preferably before first antibiotic dose - add Gram stain if  indicated    INFLUENZA A/B BY PCR [[id number]] Collected:  09/22/18 0000    Order Status:  Completed Specimen:  Throat Updated:  09/22/18 1714     Influenza virus A RNA Negative     Influenza virus B, PCR Negative    Culture Respiratory W/ GRM STN [[id number]] Collected:  09/22/18 1605    Order Status:  Canceled Specimen:  Sputum from Sputum Updated:  09/22/18 1700    URINALYSIS CULTURE, IF INDICATED [[id number]]  (Abnormal) Collected:  09/22/18 0945    Order Status:  Completed Specimen:  Urine Updated:  09/22/18 0953     Color Yellow     Character Clear     Specific Gravity 1.010     Ph 6.0     Glucose Negative mg/dL      Ketones Negative mg/dL      Protein 30 (A) mg/dL      Bilirubin Negative     Urobilinogen, Urine 1.0     Nitrite Negative     Leukocyte Esterase Negative     Occult Blood Small (A)     Micro Urine Req Microscopic    INFLUENZA RAPID [[id number]] Collected:  09/22/18 0000    Order Status:  Canceled Specimen:  Other from Respiratory     CULTURE RESPIRATORY W/ GRM STN [[id number]] Collected:  09/22/18 0000    Order Status:  Canceled Specimen:  Sputum from Sputum     RESPIRATORY VIRUS PANEL BY PCR [[id number]] Collected:  09/22/18 0000    Order Status:  Canceled Specimen:  Respirate from Nasopharyngeal     INFLUENZA A/B BY PCR [[id number]] Collected:   09/22/18 0000    Order Status:  Canceled Specimen:  Respirate from Nasopharyngeal           Assessment:  Active Hospital Problems    Diagnosis   • Esophageal rupture [K22.3]   • Tobacco abuse [Z72.0]   • Acute respiratory failure with hypoxia ([facility]) [J96.01]   • Pneumonia [J18.9]   • Pneumomediastinum ([facility]) [J98.2]   • N&V (nausea and vomiting) [R11.2]   • Sepsis ([facility]) [A41.9]       Plan:  Invasive pneumococcal disease  Patient is feeling much better  He cannot be treated with p.o. amoxicillin due to poor bioavailability  Change to p.o. Levaquin  Aim for at least 10 more days  HIV is negative  Needs repeat chest x-ray at the end of the treatment    Pneumomediastinum  Due to nausea vomiting and esophageal tear    Discussed with internal medicine.

## 2018-09-26 NOTE — CARE PLAN
Problem: Safety  Goal: Will remain free from falls    Intervention: Assess risk factors for falls  Fall measures in place. Call light within reach, personal belongings close-by, bed in lowest position, IV pole on same side of bathroom, upper bed rails up, hourly rounding in place. Will continue to monitor pt for safety.       Problem: Pain Management  Goal: Pain level will decrease to patient's comfort goal    Intervention: Follow pain managment plan developed in collaboration with patient and Interdisciplinary Team  Pt denies having any pain at the moment, hourly rounding in place.

## 2018-09-27 ENCOUNTER — PATIENT OUTREACH (OUTPATIENT)
Dept: HEALTH INFORMATION MANAGEMENT | Facility: OTHER | Age: 28
End: 2018-09-27

## 2018-09-27 VITALS
TEMPERATURE: 98.1 F | HEIGHT: 65 IN | WEIGHT: 128.97 LBS | SYSTOLIC BLOOD PRESSURE: 104 MMHG | DIASTOLIC BLOOD PRESSURE: 81 MMHG | OXYGEN SATURATION: 95 % | RESPIRATION RATE: 16 BRPM | HEART RATE: 76 BPM | BODY MASS INDEX: 21.49 KG/M2

## 2018-09-27 LAB
BASOPHILS # BLD AUTO: 0 % (ref 0–1.8)
BASOPHILS # BLD: 0 K/UL (ref 0–0.12)
EOSINOPHIL # BLD AUTO: 0.2 K/UL (ref 0–0.51)
EOSINOPHIL NFR BLD: 0.9 % (ref 0–6.9)
ERYTHROCYTE [DISTWIDTH] IN BLOOD BY AUTOMATED COUNT: 40.4 FL (ref 35.9–50)
HCT VFR BLD AUTO: 35.5 % (ref 42–52)
HGB BLD-MCNC: 12.3 G/DL (ref 14–18)
HIV 1 PRO DNA BLD QL NAA+PROBE: NOT DETECTED
LYMPHOCYTES # BLD AUTO: 4.19 K/UL (ref 1–4.8)
LYMPHOCYTES NFR BLD: 18.6 % (ref 22–41)
MANUAL DIFF BLD: NORMAL
MCH RBC QN AUTO: 30 PG (ref 27–33)
MCHC RBC AUTO-ENTMCNC: 34.6 G/DL (ref 33.7–35.3)
MCV RBC AUTO: 86.6 FL (ref 81.4–97.8)
METAMYELOCYTES NFR BLD MANUAL: 1.8 %
MONOCYTES # BLD AUTO: 1.19 K/UL (ref 0–0.85)
MONOCYTES NFR BLD AUTO: 5.3 % (ref 0–13.4)
MORPHOLOGY BLD-IMP: NORMAL
MYELOCYTES NFR BLD MANUAL: 3.5 %
NEUTROPHILS # BLD AUTO: 15.73 K/UL (ref 1.82–7.42)
NEUTROPHILS NFR BLD: 68.1 % (ref 44–72)
NEUTS BAND NFR BLD MANUAL: 1.8 % (ref 0–10)
NRBC # BLD AUTO: 0 K/UL
NRBC BLD-RTO: 0 /100 WBC
OVALOCYTES BLD QL SMEAR: NORMAL
PLATELET # BLD AUTO: 275 K/UL (ref 164–446)
PLATELET BLD QL SMEAR: NORMAL
PMV BLD AUTO: 10.5 FL (ref 9–12.9)
POLYCHROMASIA BLD QL SMEAR: NORMAL
RBC # BLD AUTO: 4.1 M/UL (ref 4.7–6.1)
RBC BLD AUTO: PRESENT
TOXIC GRANULES BLD QL SMEAR: NORMAL
WBC # BLD AUTO: 22.5 K/UL (ref 4.8–10.8)

## 2018-09-27 PROCEDURE — 36415 COLL VENOUS BLD VENIPUNCTURE: CPT

## 2018-09-27 PROCEDURE — 85007 BL SMEAR W/DIFF WBC COUNT: CPT

## 2018-09-27 PROCEDURE — A9270 NON-COVERED ITEM OR SERVICE: HCPCS | Performed by: STUDENT IN AN ORGANIZED HEALTH CARE EDUCATION/TRAINING PROGRAM

## 2018-09-27 PROCEDURE — A9270 NON-COVERED ITEM OR SERVICE: HCPCS | Performed by: HOSPITALIST

## 2018-09-27 PROCEDURE — 700102 HCHG RX REV CODE 250 W/ 637 OVERRIDE(OP): Performed by: HOSPITALIST

## 2018-09-27 PROCEDURE — 700102 HCHG RX REV CODE 250 W/ 637 OVERRIDE(OP): Performed by: STUDENT IN AN ORGANIZED HEALTH CARE EDUCATION/TRAINING PROGRAM

## 2018-09-27 PROCEDURE — 99232 SBSQ HOSP IP/OBS MODERATE 35: CPT | Performed by: INTERNAL MEDICINE

## 2018-09-27 PROCEDURE — 85027 COMPLETE CBC AUTOMATED: CPT

## 2018-09-27 RX ORDER — FAMOTIDINE 20 MG/1
20 TABLET, FILM COATED ORAL 2 TIMES DAILY
Qty: 60 TAB | Refills: 0 | Status: SHIPPED | OUTPATIENT
Start: 2018-09-27 | End: 2020-08-15

## 2018-09-27 RX ORDER — LEVOFLOXACIN 750 MG/1
750 TABLET, FILM COATED ORAL DAILY
Qty: 9 TAB | Refills: 0 | Status: SHIPPED | OUTPATIENT
Start: 2018-09-28 | End: 2018-09-27

## 2018-09-27 RX ORDER — LEVOFLOXACIN 750 MG/1
750 TABLET, FILM COATED ORAL DAILY
Qty: 9 TAB | Refills: 0 | Status: SHIPPED | OUTPATIENT
Start: 2018-09-28 | End: 2018-10-07

## 2018-09-27 RX ORDER — FAMOTIDINE 20 MG/1
20 TABLET, FILM COATED ORAL 2 TIMES DAILY
Qty: 60 TAB | Refills: 0 | Status: SHIPPED | OUTPATIENT
Start: 2018-09-27 | End: 2018-09-27

## 2018-09-27 RX ORDER — FLUMAZENIL 0.1 MG/ML
INJECTION INTRAVENOUS
Status: DISCONTINUED
Start: 2018-09-27 | End: 2018-09-27 | Stop reason: HOSPADM

## 2018-09-27 RX ORDER — ONDANSETRON 4 MG/1
4 TABLET, ORALLY DISINTEGRATING ORAL EVERY 4 HOURS PRN
Qty: 15 TAB | Refills: 0 | Status: SHIPPED | OUTPATIENT
Start: 2018-09-27 | End: 2020-08-15

## 2018-09-27 RX ORDER — ONDANSETRON 4 MG/1
4 TABLET, ORALLY DISINTEGRATING ORAL EVERY 4 HOURS PRN
Qty: 15 TAB | Refills: 0 | Status: SHIPPED | OUTPATIENT
Start: 2018-09-27 | End: 2018-09-27

## 2018-09-27 RX ADMIN — LEVOFLOXACIN 750 MG: 750 TABLET, FILM COATED ORAL at 06:29

## 2018-09-27 RX ADMIN — FAMOTIDINE 20 MG: 20 TABLET ORAL at 06:29

## 2018-09-27 ASSESSMENT — PAIN SCALES - GENERAL: PAINLEVEL_OUTOF10: 0

## 2018-09-27 ASSESSMENT — ENCOUNTER SYMPTOMS
MYALGIAS: 0
VOMITING: 0
SENSORY CHANGE: 0
SPUTUM PRODUCTION: 1
ABDOMINAL PAIN: 0
NAUSEA: 0
FEVER: 0
COUGH: 1
SPEECH CHANGE: 0

## 2018-09-27 NOTE — PROGRESS NOTES
Pt requested to be transported out prior to receiving financial assistance for medications. RN educated and pt stated he understood, would pay himself, and wanted to leave now.

## 2018-09-27 NOTE — PROGRESS NOTES
Pulmonary Progress Note    PATIENT: Gabriel Lemos  MRN: [id number]  : 1990    Admission date: 2018    ASSESSMENT/PLAN:  Active Problems:    Pneumonia POA: Yes    Pneumomediastinum (HCC) POA: Unknown    N&V (nausea and vomiting) POA: Unknown    Barium swallow nl      26 y/o man admitted with pneumonia and pneumomediastinum secondary to vomiting.  Improving rapidly on rocephin.  Grew S pneumo from blood.    Wbc decreasing   CT demonstrates his pneumonia with minimal effusion left; resolving pneumomediastinum  Okay for discharge with outpatient f/u to insure clearance of his infiltrate    SUBJECTIVE:  Feeling better    MEDICATIONS:    Current Facility-Administered Medications:   •  levoFLOXacin (LEVAQUIN) tablet 750 mg, 750 mg, Oral, DAILY, Melodie Molina M.D., 750 mg at 18 0629  •  ipratropium-albuterol (DUONEB) nebulizer solution, 3 mL, Nebulization, Q4H PRN (RT), Marysol Islas M.D.  •  senna-docusate (PERICOLACE or SENOKOT S) 8.6-50 MG per tablet 2 Tab, 2 Tab, Oral, BID, Stopped at 18 0600 **AND** polyethylene glycol/lytes (MIRALAX) PACKET 1 Packet, 1 Packet, Oral, QDAY PRN **AND** magnesium hydroxide (MILK OF MAGNESIA) suspension 30 mL, 30 mL, Oral, QDAY PRN **AND** bisacodyl (DULCOLAX) suppository 10 mg, 10 mg, Rectal, QDAY PRN, Carrington Harden M.D.  •  Respiratory Care per Protocol, , Nebulization, Continuous RT, Carrington Harden M.D.  •  acetaminophen (TYLENOL) tablet 650 mg, 650 mg, Oral, Q6HRS PRN, Carrington Harden M.D., 650 mg at 18 0714  •  ondansetron (ZOFRAN) syringe/vial injection 4 mg, 4 mg, Intravenous, Q4HRS PRN, Carrington Harden M.D.  •  ondansetron (ZOFRAN ODT) dispertab 4 mg, 4 mg, Oral, Q4HRS PRN, Carrington Guerrierber, M.D.  •  promethazine (PHENERGAN) tablet 12.5-25 mg, 12.5-25 mg, Oral, Q4HRS PRCarrington HARGROVE M.D.  •  promethazine (PHENERGAN) suppository 12.5-25 mg, 12.5-25 mg, Rectal, Q4HRS PRBEENA, Carrington Harden M.D.  •   "prochlorperazine (COMPAZINE) injection 5-10 mg, 5-10 mg, Intravenous, Q4HRS PRN, Carrington Harden M.D.  •  famotidine (PEPCID) tablet 20 mg, 20 mg, Oral, BID, 20 mg at 09/27/18 0629 **OR** [DISCONTINUED] famotidine (PEPCID) injection 20 mg, 20 mg, Intravenous, BID, Carrington Harden M.D., 20 mg at 09/25/18 0553  •  nicotine (NICODERM) 7 MG/24HR 7 mg, 7 mg, Transdermal, Daily-0600, Riki Osorio M.D., 7 mg at 09/26/18 0543    OBJECTIVE:    /81   Pulse 71   Temp 36.7 °C (98.1 °F)   Resp 16   Ht 1.651 m (5' 5\")   Wt 58.5 kg (128 lb 15.5 oz)   SpO2 95%   BMI 21.46 kg/m²   General: alert, conversant in full sentences  HEENT: Sclera clear, conjunctiva pink, oropharynx clear, mucus membranes moist  Heart: regular  Lungs: rales left posteriorly with dullness on percussion  Abdomen: soft  Extremities: no clubbing cyanosis or edema  Neuro: intact  Skin: intact    DATA REVIEW:  LABORATORY DATA:    Recent Labs      09/25/18   0211  09/26/18   0157  09/27/18   0336   WBC  24.2*  25.7*  22.5*   RBC  3.79*  4.24*  4.10*   HEMOGLOBIN  11.7*  12.7*  12.3*   HEMATOCRIT  32.4*  36.8*  35.5*   MCV  85.5  86.8  86.6   MCH  30.9  30.0  30.0   MCHC  36.1*  34.5  34.6   RDW  39.9  41.3  40.4   PLATELETCT  199  234  275   MPV  11.0  10.9  10.5     Recent Labs      09/26/18   0157   SODIUM  138   POTASSIUM  3.3*   CHLORIDE  104   CO2  26   GLUCOSE  95   BUN  13   CREATININE  0.80   CALCIUM  9.0             Lab Results  Component Value Date/Time   LVQPD33M 7.45 09/22/2018 1658   [id number] 38.3 (H) 09/22/2018 1658   AIFII369E 46.6 (LL) 09/22/2018 1658   STIH6UEU 82.8 (L) 09/22/2018 1658   ARTHCO3 26 (H) 09/22/2018 1658   ARTBE 2 09/22/2018 1658   BDYTEMP see below 09/22/2018 1658      Cultures strep pneumonia from blood     IMAGING:   CXR (personally reviewed) - infiltrate left dense    CT -1.  Trace LEFT pleural effusion  2.  Improved BILATERAL LEFT greater than RIGHT airspace disease, likely pneumonia  3.  Decreased " pneumomediastinum    Assessment and plan as above  Time 35 minutes    Oskar Young M.D.

## 2018-09-27 NOTE — CARE PLAN
Problem: Communication  Goal: The ability to communicate needs accurately and effectively will improve  POC discussed with pt. Communication board updated. Pt's questions answered at bedside. Pt eager for discharge.    Problem: Bowel/Gastric:  Goal: Normal bowel function is maintained or improved  Outcome: PROGRESSING AS EXPECTED

## 2018-09-27 NOTE — CARE PLAN
Problem: Safety  Goal: Will remain free from injury  Outcome: PROGRESSING AS EXPECTED  Bed in low, bed alarm in place, call light in reach, and hourly rounding      Problem: Respiratory:  Goal: Respiratory status will improve  Outcome: PROGRESSING AS EXPECTED  IS use

## 2018-09-27 NOTE — PROGRESS NOTES
Infectious Disease Progress Note    Author: Freda Fang M.D. Date & Time of service: 2018  10:32 AM    Chief Complaint:  Sepsis     Interval History:  - no fevers. Feels much better. WBC 25.7  Labs Reviewed, Medications Reviewed and Radiology Reviewed.    Review of Systems:  Review of Systems   Constitutional: Positive for malaise/fatigue. Negative for fever.        Feels much better   HENT: Negative for hearing loss.    Respiratory: Positive for cough and sputum production.         Much improved   Cardiovascular: Negative for chest pain.   Gastrointestinal: Negative for abdominal pain, nausea and vomiting.   Genitourinary: Negative for dysuria.   Musculoskeletal: Negative for myalgias.   Neurological: Negative for sensory change and speech change.       Hemodynamics:  Temp (24hrs), Av.7 °C (98 °F), Min:36.3 °C (97.4 °F), Max:37 °C (98.6 °F)  Temperature: 36.7 °C (98.1 °F)  Pulse  Av.5  Min: 51  Max: 143  Blood Pressure: 104/81       Physical Exam:  Physical Exam   Constitutional: He is oriented to person, place, and time. No distress.   HENT:   Mouth/Throat: No oropharyngeal exudate.   Eyes: No scleral icterus.   Neck: Neck supple.   Cardiovascular: Regular rhythm.    No murmur heard.  Pulmonary/Chest: He has rales.   Abdominal: There is no tenderness. There is no rebound.   Musculoskeletal: He exhibits no edema.   Neurological: He is alert and oriented to person, place, and time.   Vitals reviewed.      Meds:    Current Facility-Administered Medications:   •  levoFLOXacin  •  ipratropium-albuterol  •  senna-docusate **AND** polyethylene glycol/lytes **AND** magnesium hydroxide **AND** bisacodyl  •  Respiratory Care per Protocol  •  acetaminophen  •  ondansetron  •  ondansetron  •  promethazine  •  promethazine  •  prochlorperazine  •  famotidine **OR** [DISCONTINUED] famotidine  •  nicotine    Labs:  Recent Labs      18   0211  18   0157  18   0336   WBC  24.2*  25.7*   22.5*   RBC  3.79*  4.24*  4.10*   HEMOGLOBIN  11.7*  12.7*  12.3*   HEMATOCRIT  32.4*  36.8*  35.5*   MCV  85.5  86.8  86.6   MCH  30.9  30.0  30.0   RDW  39.9  41.3  40.4   PLATELETCT  199  234  275   MPV  11.0  10.9  10.5   NEUTSPOLYS  80.20*  70.00  68.10   LYMPHOCYTES  9.00*  14.50*  18.60*   MONOCYTES  4.50  1.80  5.30   EOSINOPHILS  0.00  0.90  0.90   BASOPHILS  0.00  0.00  0.00   RBCMORPHOLO  Normal  Normal  Present     Recent Labs      09/26/18   0157   SODIUM  138   POTASSIUM  3.3*   CHLORIDE  104   CO2  26   GLUCOSE  95   BUN  13     Recent Labs      09/26/18   0157   CREATININE  0.80       Imaging:  Ct-chest (thorax) With    Result Date: 9/22/2018 9/22/2018 2:04 PM HISTORY/REASON FOR EXAM:  Thoracic Pain. Pneumomediastinum.  Extensive pneumonia. TECHNIQUE/EXAM DESCRIPTION: CT scan of the chest with contrast. Thin-section helical images were obtained from the lung apices through the adrenal glands following the bolus administration of contrast. 75 mL of Omnipaque 350 nonionic contrast was utilized. Low dose optimization technique was utilized for this CT exam including automated exposure control and adjustment of the mA and/or kV according to patient size. COMPARISON:  Chest x-ray 9/22/2018 FINDINGS: Thoracic aorta is normal in caliber. Pneumomediastinum present tracking into the lower neck and retroclavicular soft tissues, primarily on the RIGHT. Extensive consolidation of the LEFT upper and lower lobes with air bronchograms present. Multiple foci of ill-defined opacity in the RIGHT upper and lower lobes. No major bony abnormality is seen. Contrast present within the stomach and bowel from recent esophagram. No extravasation of contrast into the mediastinum.     1.  Pneumomediastinum as seen on recent chest x-ray. 2.  Extensive multifocal pneumonia particularly involving the LEFT upper and lower lobes.  Multifocal ill-defined parenchymal opacities in the RIGHT lung.  Follow-up recommended to resolution  to exclude underlying process. 3.  No pneumothorax demonstrated. 4.  No extravasation of oral contrast into the mediastinum.     Dx-chest-limited (1 View)    Result Date: 9/24/2018 9/24/2018 8:15 AM HISTORY/REASON FOR EXAM:  Shortness of Breath TECHNIQUE/EXAM DESCRIPTION AND NUMBER OF VIEWS: Single portable view of the chest. COMPARISON: 9/23/2018 FINDINGS: Slightly less conspicuous pneumomediastinum. Extensive airspace opacities in the left mid and lower lung. No pleural effusion. No appreciable pneumothorax. Stable cardiopericardial silhouette.     Slightly less conspicuous pneumomediastinum.    Dx-chest-portable (1 View)    Result Date: 9/26/2018 9/26/2018 7:40 AM HISTORY/REASON FOR EXAM:  Shortness of Breath. Pneumonia. Pneumomediastinum TECHNIQUE/EXAM DESCRIPTION AND NUMBER OF VIEWS: Single portable view of the chest. COMPARISON: Yesterday FINDINGS: There is once again evidence of pneumomediastinum. There is dense consolidation in the left upper lobe and left lower lobe as well as mild multifocal consolidation in the right lung. No pneumothorax. No definite effusion.     Persistent multifocal consolidation/pneumonia primarily affecting the left lung. Pneumomediastinum.    Dx-chest-portable (1 View)    Result Date: 9/25/2018 9/25/2018 8:29 AM HISTORY/REASON FOR EXAM:  Shortness of Breath. TECHNIQUE/EXAM DESCRIPTION AND NUMBER OF VIEWS: Single portable view of the chest. COMPARISON: 9/24/2018 FINDINGS: LUNGS: Stable consolidation in the left lung. Stable elevation of the left hemidiaphragm. Right lung is clear. PNEUMOTHORAX: None. LINES AND TUBES: None. MEDIASTINUM: No cardiomegaly. Minimal pneumomediastinum, unchanged. MUSCULOSKELETAL STRUCTURES: No acute fracture.     1.  Minimal pneumomediastinum, unchanged. 2.  Stable left pulmonary consolidation.    Dx-chest-portable (1 View)    Result Date: 9/23/2018 9/23/2018 3:40 AM HISTORY/REASON FOR EXAM:  Cough TECHNIQUE/EXAM DESCRIPTION AND NUMBER OF VIEWS: Single  portable view of the chest. COMPARISON: 9/22/2018 FINDINGS: Redemonstration of pneumomediastinum. Extensive airspace opacities in the left mid and lower lung. No pleural effusion. No appreciable pneumothorax. Stable cardiopericardial silhouette.     1. No significant interval change.    Dx-chest-portable (1 View)    Result Date: 9/22/2018 9/22/2018 10:31 AM HISTORY/REASON FOR EXAM:  Cough. Shortness of breath, LEFT flank pain, vomiting. TECHNIQUE/EXAM DESCRIPTION AND NUMBER OF VIEWS: Single portable view of the chest. COMPARISON: None FINDINGS: Cardiomediastinal contour is within normal limits. Lucency consistent with pneumomediastinum. Ill-defined extensive parenchymal opacity in the LEFT mid and lower lung. Minimal opacity in the RIGHT midlung. Possible small RIGHT apical pneumothorax. Subcutaneous gas in the RIGHT supraclavicular region. No major bony abnormality is seen.     1.  Pneumomediastinum and possible tiny RIGHT apical pneumothorax. 2.  Extensive LEFT mid and lower lung consolidation, likely pneumonia. 3.  Possible minimal RIGHT midlung infiltrate. 4.  This constellation of findings is concerning for esophageal perforation. These findings were discussed with SOFIA CASEY on 9/22/2018 11:03 AM.     Dx-esoph. Fluoro (barium Swallow)    Result Date: 9/22/2018 9/22/2018 12:27 PM HISTORY/REASON FOR EXAM:  Abdominal Pain. Vomiting, pneumomediastinum. TECHNIQUE/EXAM DESCRIPTION AND NUMBER OF VIEWS: Water-soluble esophagram procedure was performed. 20 fluoroscopic images obtained. Fluoroscopy time: 0.6 minutes COMPARISON:  Chest x-ray 9/22/2018 FINDINGS: Initially water-soluble contrast was administered orally.  Esophagus is normal in caliber, without evidence for extravasation. Subsequent to the patient was given oral barium, again showing normal caliber esophagus.     Normal caliber esophagus without evidence for esophageal perforation.      Micro:  Results     Procedure Component Value Units Date/Time     "BLOOD CULTURE [[id number]]  (Abnormal) Collected:  09/22/18 1407    Order Status:  Completed Specimen:  Blood from Peripheral Updated:  09/25/18 0822     Significant Indicator POS (POS)     Source BLD     Site PERIPHERAL     Blood Culture Growth detected by Bactec instrument. 09/23/2018  08:57 (A)      Streptococcus pneumoniae  See previous culture for sensitivity report.   (A)    Narrative:       CALL  [place]  161 tel. [phone],  Per Hospital Policy: Only change Specimen Src: to \"Line\" if  specified by physician order.    BLOOD CULTURE [[id number]]  (Abnormal)  (Susceptibility) Collected:  09/22/18 1407    Order Status:  Completed Specimen:  Blood from Peripheral Updated:  09/25/18 0820     Significant Indicator POS (POS)     Source BLD     Site PERIPHERAL     Blood Culture Growth detected by Bactec instrument. 09/23/2018  08:54 (A)      Streptococcus pneumoniae (A)    Narrative:       CALL  [place]  161 tel. [phone],  Per Hospital Policy: Only change Specimen Src: to \"Line\" if  specified by physician order.    Culture & Susceptibility     STREPTOCOCCUS PNEUMONIAE     Antibiotic Sensitivity Microscan Unit Status    Cefotaxime-meningitis Sensitive .012 mcg/mL Final    Method: SENSITIVITY, E-TEST    Cefotaxime-non meningitis Sensitive .012 mcg/mL Final    Method: SENSITIVITY, E-TEST    Erythromycin Sensitive .125 mcg/mL Final    Method: SENSITIVITY, E-TEST    Penicillin non-meningitis Sensitive .016 mcg/mL Final    Method: SENSITIVITY, E-TEST    Penicillin-meningitis Sensitive .016 mcg/mL Final    Method: SENSITIVITY, E-TEST                       SENSITIVITY, E-TEST [[id number]] Collected:  09/22/18 1407    Order Status:  Completed Specimen:  Blood Updated:  09/24/18 1054     ETEST Sensitivity INTERIM    Narrative:       161 tel. [phone] 09/23/2018, 08:56, RB PERF. RESULTS CALLED TO:VIET RN  Per Hospital Policy: Only change Specimen Src: to \"Line\" if  specified by physician order.    BLOOD CULTURE [[id number]] " "Collected:  09/23/18 1425    Order Status:  Completed Specimen:  Blood from Peripheral Updated:  09/24/18 0844     Significant Indicator NEG     Source BLD     Site PERIPHERAL     Blood Culture No Growth    Note: Blood cultures are incubated for 5 days and  are monitored continuously.Positive blood cultures  are called to the RN and reported as soon as  they are identified.      Narrative:       Collected By:[id number] WLI ALONZO  Per Hospital Policy: Only change Specimen Src: to \"Line\" if  specified by physician order.    BLOOD CULTURE [[id number]] Collected:  09/23/18 1429    Order Status:  Completed Specimen:  Blood from Peripheral Updated:  09/24/18 0844     Significant Indicator NEG     Source BLD     Site PERIPHERAL     Blood Culture No Growth    Note: Blood cultures are incubated for 5 days and  are monitored continuously.Positive blood cultures  are called to the RN and reported as soon as  they are identified.      Narrative:       Collected By:[id number] WIL ALONZO  X2  Per Hospital Policy: Only change Specimen Src: to \"Line\" if  specified by physician order.    MRSA BY PCR (AMP) [[id number]] Collected:  09/23/18 1004    Order Status:  Completed Specimen:  Respirate from Nares Updated:  09/23/18 1452     Significant Indicator NEG     Source RESP     Site NARES     MRSA PCR Negative for MRSA by PCR.    Narrative:       Collected By:[id number] WIL ALONZO    BLOOD CULTURE [[id number]]     Order Status:  Canceled Specimen:  Blood from Peripheral     MRSA BY PCR (AMP) [[id number]]     Order Status:  Canceled Specimen:  Respirate from Nares     GRAM STAIN [[id number]] Collected:  09/22/18 1605    Order Status:  Completed Specimen:  Respirate Updated:  09/23/18 1335     Significant Indicator .     Source RESP     Site SPUTUM     Gram Stain Result Sputum Gram stain quality score is <1, probable  oropharyngeal contamination. Culture not performed.  Recollect if clinically indicated.      Narrative:       " Preferably before first antibiotic dose - add Gram stain if  indicated    INFLUENZA A/B BY PCR [[id number]] Collected:  09/22/18 0000    Order Status:  Completed Specimen:  Throat Updated:  09/22/18 1714     Influenza virus A RNA Negative     Influenza virus B, PCR Negative    Culture Respiratory W/ GRM STN [[id number]] Collected:  09/22/18 1605    Order Status:  Canceled Specimen:  Sputum from Sputum Updated:  09/22/18 1700    URINALYSIS CULTURE, IF INDICATED [[id number]]  (Abnormal) Collected:  09/22/18 0945    Order Status:  Completed Specimen:  Urine Updated:  09/22/18 0953     Color Yellow     Character Clear     Specific Gravity 1.010     Ph 6.0     Glucose Negative mg/dL      Ketones Negative mg/dL      Protein 30 (A) mg/dL      Bilirubin Negative     Urobilinogen, Urine 1.0     Nitrite Negative     Leukocyte Esterase Negative     Occult Blood Small (A)     Micro Urine Req Microscopic    INFLUENZA RAPID [[id number]] Collected:  09/22/18 0000    Order Status:  Canceled Specimen:  Other from Respiratory     CULTURE RESPIRATORY W/ GR STN [[id number]] Collected:  09/22/18 0000    Order Status:  Canceled Specimen:  Sputum from Sputum     RESPIRATORY VIRUS PANEL BY PCR [[id number]] Collected:  09/22/18 0000    Order Status:  Canceled Specimen:  Respirate from Nasopharyngeal     INFLUENZA A/B BY PCR [[id number]] Collected:  09/22/18 0000    Order Status:  Canceled Specimen:  Respirate from Nasopharyngeal           Assessment:  Active Hospital Problems    Diagnosis   • Esophageal rupture [K22.3]   • Tobacco abuse [Z72.0]   • Acute respiratory failure with hypoxia (HCC) [J96.01]   • Pneumonia [J18.9]   • Pneumomediastinum (HCC) [J98.2]   • N&V (nausea and vomiting) [R11.2]   • Sepsis (HCC) [A41.9]       Plan:  Invasive pneumococcal disease  Patient is feeling much better  He cannot be treated with p.o. amoxicillin due to poor bioavailability  Change to p.o. Levaquin  Aim for at least 10 more days  HIV is negative  Needs  repeat chest x-ray at the end of the treatment    Pneumomediastinum  Due to nausea vomiting and esophageal tear    Discussed with internal medicine.

## 2018-09-27 NOTE — DISCHARGE INSTRUCTIONS
Discharge Instructions    Discharged to home by car with friend. Discharged via walking, hospital escort: Refused.  Special equipment needed: Not Applicable    Be sure to schedule a follow-up appointment with your primary care doctor or any specialists as instructed.     Discharge Plan:   Diet Plan: Discussed  Activity Level: Discussed  Smoking Cessation Offered: Patient Refused  Confirmed Follow up Appointment: Appointment Scheduled  Confirmed Symptoms Management: Discussed  Medication Reconciliation Updated: Yes  Influenza Vaccine Indication: Not indicated: Previously immunized this influenza season and > 8 years of age    I understand that a diet low in cholesterol, fat, and sodium is recommended for good health. Unless I have been given specific instructions below for another diet, I accept this instruction as my diet prescription.   Other diet: regular    Special Instructions: None    · Is patient discharged on Warfarin / Coumadin?   No     Depression / Suicide Risk    As you are discharged from this [facility] facility, it is important to learn how to keep safe from harming yourself.    Recognize the warning signs:  · Abrupt changes in personality, positive or negative- including increase in energy   · Giving away possessions  · Change in eating patterns- significant weight changes-  positive or negative  · Change in sleeping patterns- unable to sleep or sleeping all the time   · Unwillingness or inability to communicate  · Depression  · Unusual sadness, discouragement and loneliness  · Talk of wanting to die  · Neglect of personal appearance   · Rebelliousness- reckless behavior  · Withdrawal from people/activities they love  · Confusion- inability to concentrate     If you or a loved one observes any of these behaviors or has concerns about self-harm, here's what you can do:  · Talk about it- your feelings and reasons for harming yourself  · Remove any means that you might use to hurt yourself (examples:  pills, rope, extension cords, firearm)  · Get professional help from the community (Mental Health, Substance Abuse, psychological counseling)  · Do not be alone:Call your Safe Contact- someone whom you trust who will be there for you.  · Call your local CRISIS HOTLINE 904-6977 or 233-553-6264  · Call your local Children's Mobile Crisis Response Team [place] (720) 442-3162 or www.Communicado  · Call the toll free National Suicide Prevention Hotlines   · National Suicide Prevention Lifeline 324-244-NFHP (9537)  · National Hope Line Network 800-SUICIDE (818-8635)

## 2018-09-27 NOTE — DISCHARGE PLANNING
[facility] states pt scripts did not go through, no prescription coverage for pt and cash price will be $388.27. RN CM went to tell pt and to start process for approved services, but pt insisted on being discharged so pt was dc'd and had possession of paper scripts. Elva at [facility] notified by RN CM that pt left and RN CM does not know if pt will be picking up prescriptions or not.

## 2018-09-27 NOTE — DISCHARGE PLANNING
Care Transition Team Assessment    Information Source  Orientation : Oriented x 4  Information Given By: Patient  Informant's Name: Gabriel Lemos  Who is responsible for making decisions for patient? : Patient    Readmission Evaluation  Is this a readmission?: No    Elopement Risk  Legal Hold: No  Ambulatory or Self Mobile in Wheelchair: No-Not an Elopement Risk  Disoriented: No  Psychiatric Symptoms: None  History of Wandering: No  Elopement this Admit: No  Vocalizing Wanting to Leave: No  Displays Behaviors, Body Language Wanting to Leave: No-Not at Risk for Elopement  Elopement Risk: Not at Risk for Elopement    Interdisciplinary Discharge Planning  Does Admitting Nurse Feel This Could be a Complex Discharge?: No  Primary Care Physician: Joana Mckenzie MD  Lives with - Patient's Self Care Capacity: Spouse  Patient or legal guardian wants to designate a caregiver (see row info): No  Support Systems: Family Member(s)  Do You Take your Prescribed Medications Regularly: Yes  Able to Return to Previous ADL's: Yes  Mobility Issues: No  Prior Services: None  Patient Expects to be Discharged to::  (home)  Assistance Needed: No  Durable Medical Equipment: Not Applicable    Discharge Preparedness  What is your plan after discharge?: Other (comment)  What are your discharge supports?: Spouse  Prior Functional Level: Ambulatory, Drives Self, Independent with Activities of Daily Living  Difficulity with ADLs: None  Difficulity with IADLs: None    Functional Assesment  Prior Functional Level: Ambulatory, Drives Self, Independent with Activities of Daily Living    Finances  Financial Barriers to Discharge:  (pt doesn't know copays)  Prescription Coverage: Yes    Vision / Hearing Impairment  Vision Impairment : Yes  Right Eye Vision: Wears Glasses  Left Eye Vision: Wears Glasses  Hearing Impairment : No    Values / Beliefs / Concerns  Values / Beliefs Concerns : No    Advance Directive  Advance Directive?: None  Advance Directive  offered?: AD Booklet refused    Domestic Abuse  Have you ever been the victim of abuse or violence?: No  Physical Abuse or Sexual Abuse: No  Verbal Abuse or Emotional Abuse: No  Possible Abuse Reported to:: Not Applicable    Psychological Assessment  History of Substance Abuse: None  History of Psychiatric Problems: No  Non-compliant with Treatment: No  Newly Diagnosed Illness: Yes    Discharge Risks or Barriers  Discharge risks or barriers?: No  Patient risk factors:  (none)    Anticipated Discharge Information  Anticipated discharge disposition: Home  Discharge Address: [facility] [address], [place]  Discharge Contact Phone Number: [phone]

## 2018-09-27 NOTE — PROGRESS NOTES
Pt received d/c education r/t follow up, medications, diet and smoking cessation. All questions answered.

## 2018-09-27 NOTE — DISCHARGE SUMMARY
Discharge Summary    CHIEF COMPLAINT ON ADMISSION  Chief Complaint   Patient presents with   • N/V   • Abdominal Pain   • Body Aches       Reason for Admission  Vomiting     Admission Date  9/22/2018    CODE STATUS  Prior    HPI & HOSPITAL COURSE  This is a 27 y.o. male here without significant history apart from active smoking ~5 cigarettes who presented to the ER with a complaint of  worsening cough, pleuritic chest pain, abdominal pain and nausea which had become severe prior to admission. In the emergency room the patient was found to have significant lobar PNA with evidence of Pneumomediastinum and was hypoxic. Given the patients condition and imaging findings, blood cultures were drawn, he was started on broad spectrum antibiotics and he was transferred to the ICU for further management. The patient improved fairly quickly while in the ICU and never needed to be intubated or placed on vasopressors. The patient's blood cultures from the day of admission grew 2/2 Strep Pneumo (repeat cultures negative) therefor antibiotics were deescalated from vanc and unasyn to rocephin with continued improvement. On HD3 the patient contacted [facility] and requested that we take over care of the patient as he is a patient of Dr. Mckenzie's that same day the patient was downgraded from the ICU and was afebrile and taken off O2.  The patient continued to improve, was transitioned to PO antibiotics (levoquin) and was on PO for >24hrs prior to DC.      # Community acquired PNA growing strep pneumoniae on 2 peripheral blood cultures  #Sepsis and bacteremia: resolved, last 2 Blood cxs negative  # Hypoxic respiratory failure requiring high flow oxygen in ICU - resolved   # Pneumomediastinum likely from an esophageal tear 2/2 nausea / vomiting     Therefore, he is discharged in good and stable condition to home with close outpatient follow-up.    The patient met 2-midnight criteria for an inpatient stay at the time of  discharge.    Discharge Date  9/27/2018    FOLLOW UP ITEMS POST DISCHARGE  Will need repeat imaging to assure pneumomediastinum has resolved.   Pulm F/u     DISCHARGE DIAGNOSES  Active Problems:    Pneumonia POA: Yes    Pneumomediastinum (HCC) POA: Unknown    N&V (nausea and vomiting) POA: Unknown    Sepsis (HCC) POA: Unknown    Esophageal rupture POA: Unknown    Tobacco abuse POA: Unknown    Acute respiratory failure with hypoxia (HCC) POA: Unknown  Resolved Problems:    Abdominal pain POA: Unknown    Tachycardia POA: Unknown      FOLLOW UP  No future appointments.  Joana Mckenzie M.D.  [id number] [address]  [facility] [address]  [phone]    Schedule an appointment as soon as possible for a visit in 1 week  [facility]   left a message for your provider regarding need for a follow up appointment. Please call their office directly if you do not hear from them with in 2 days. Thank you      MEDICATIONS ON DISCHARGE     Medication List      START taking these medications      Instructions   famotidine 20 MG Tabs  Commonly known as:  PEPCID   Take 1 Tab by mouth 2 Times a Day.  Dose:  20 mg     levoFLOXacin 750 MG tablet  Commonly known as:  LEVAQUIN   Take 1 Tab by mouth every day for 9 days.  Dose:  750 mg     ondansetron 4 MG Tbdp  Commonly known as:  ZOFRAN ODT   Take 1 Tab by mouth every four hours as needed for Nausea (give PO if no IV route available).  Dose:  4 mg            Allergies  No Known Allergies    DIET  No orders of the defined types were placed in this encounter.      ACTIVITY  As tolerated.  Weight bearing as tolerated    CONSULTATIONS  Pulmonology   Gastroenterology     PROCEDURES  CT-Chest 9/22:  Impression       1.  Pneumomediastinum as seen on recent chest x-ray.  2.  Extensive multifocal pneumonia particularly involving the LEFT upper and lower lobes.  Multifocal ill-defined parenchymal opacities in the RIGHT lung.  Follow-up recommended to resolution to exclude underlying process.  3.  No  pneumothorax demonstrated.  4.  No extravasation of oral contrast into the mediastinum.       CT-Chest 9-26  Impression       1.  Trace LEFT pleural effusion  2.  Improved BILATERAL LEFT greater than RIGHT airspace disease, likely pneumonia  3.  Decreased pneumomediastinum         LABORATORY  Lab Results   Component Value Date    SODIUM 138 09/26/2018    POTASSIUM 3.3 (L) 09/26/2018    CHLORIDE 104 09/26/2018    CO2 26 09/26/2018    GLUCOSE 95 09/26/2018    BUN 13 09/26/2018    CREATININE 0.80 09/26/2018        Lab Results   Component Value Date    WBC 22.5 (H) 09/27/2018    HEMOGLOBIN 12.3 (L) 09/27/2018    HEMATOCRIT 35.5 (L) 09/27/2018    PLATELETCT 275 09/27/2018          David Zuniga M.D.   PGY-2  [facility]   [phone]      CC: Joana Mckenzie M.D.

## 2018-09-27 NOTE — DISCHARGE PLANNING
Anticipated Discharge Disposition: Home without skilled needs.    Action: RN CM met with pt at bedside and discussed copays for scripts. Pt states he doesn't know if he has a copay or not. Pt agreeable to contacting [facility] for him to see what the copay will be.    Barriers to Discharge: None.    Plan: RN CM to assist with prescriptions if pt cannot afford copay.

## 2018-09-28 LAB
BACTERIA BLD CULT: NORMAL
BACTERIA BLD CULT: NORMAL
SIGNIFICANT IND 70042: NORMAL
SIGNIFICANT IND 70042: NORMAL
SITE SITE: NORMAL
SITE SITE: NORMAL
SOURCE SOURCE: NORMAL
SOURCE SOURCE: NORMAL

## 2018-10-01 ENCOUNTER — TELEPHONE (OUTPATIENT)
Dept: INFECTIOUS DISEASES | Facility: MEDICAL CENTER | Age: 28
End: 2018-10-01

## 2018-10-02 ENCOUNTER — TELEPHONE (OUTPATIENT)
Dept: INFECTIOUS DISEASES | Facility: MEDICAL CENTER | Age: 28
End: 2018-10-02

## 2018-10-04 ENCOUNTER — TELEPHONE (OUTPATIENT)
Dept: INFECTIOUS DISEASES | Facility: MEDICAL CENTER | Age: 28
End: 2018-10-04

## 2018-10-04 NOTE — TELEPHONE ENCOUNTER
Called and LM for pt to return my call to schedule a hospital follow up appointment. Letter sent.  -AMP

## 2019-12-02 ENCOUNTER — APPOINTMENT (OUTPATIENT)
Dept: RADIOLOGY | Facility: IMAGING CENTER | Age: 29
End: 2019-12-02
Attending: FAMILY MEDICINE
Payer: COMMERCIAL

## 2019-12-02 ENCOUNTER — OFFICE VISIT (OUTPATIENT)
Dept: URGENT CARE | Facility: CLINIC | Age: 29
End: 2019-12-02
Payer: COMMERCIAL

## 2019-12-02 VITALS
TEMPERATURE: 98.3 F | OXYGEN SATURATION: 98 % | WEIGHT: 143 LBS | HEART RATE: 80 BPM | DIASTOLIC BLOOD PRESSURE: 78 MMHG | HEIGHT: 66 IN | SYSTOLIC BLOOD PRESSURE: 102 MMHG | BODY MASS INDEX: 22.98 KG/M2 | RESPIRATION RATE: 17 BRPM

## 2019-12-02 DIAGNOSIS — J22 LRTI (LOWER RESPIRATORY TRACT INFECTION): ICD-10-CM

## 2019-12-02 PROCEDURE — 71046 X-RAY EXAM CHEST 2 VIEWS: CPT | Mod: TC | Performed by: FAMILY MEDICINE

## 2019-12-02 PROCEDURE — 99204 OFFICE O/P NEW MOD 45 MIN: CPT | Performed by: FAMILY MEDICINE

## 2019-12-02 RX ORDER — AZITHROMYCIN 250 MG/1
TABLET, FILM COATED ORAL
Qty: 6 TAB | Refills: 0 | Status: SHIPPED | OUTPATIENT
Start: 2019-12-02 | End: 2020-08-15

## 2019-12-02 NOTE — LETTER
December 2, 2019         Patient: Gabriel Lemos   YOB: 1990   Date of Visit: 12/2/2019           To Whom it May Concern:    Gabriel Lemos was seen in my clinic on 12/2/2019. He may return to work on 12/4/2019..    If you have any questions or concerns, please don't hesitate to call.        Sincerely,           Shaq Oates M.D.  Electronically Signed

## 2019-12-03 NOTE — PROGRESS NOTES
Subjective:     Gabriel Lemos  is a 29 y.o. male who presents for Pneumonia (dx last year with pneumonia, need xray, having same symptoms, cough, congestion x2wks, coughing up yellow phlegm)       Pneumonia   He complains of cough and wheezing. There is no shortness of breath. This is a new problem. The current episode started 1 to 4 weeks ago. The problem occurs constantly. The problem has been gradually worsening. The cough is productive of purulent sputum. Associated symptoms include a fever and nasal congestion. Pertinent negatives include no chest pain, myalgias or sore throat.   History reviewed. No pertinent past medical history.History reviewed. No pertinent surgical history.  Social History     Socioeconomic History   • Marital status:      Spouse name: Not on file   • Number of children: Not on file   • Years of education: Not on file   • Highest education level: Not on file   Occupational History   • Not on file   Social Needs   • Financial resource strain: Not on file   • Food insecurity:     Worry: Not on file     Inability: Not on file   • Transportation needs:     Medical: Not on file     Non-medical: Not on file   Tobacco Use   • Smoking status: Former Smoker     Packs/day: 0.50     Years: 3.00     Pack years: 1.50     Types: Cigarettes   • Smokeless tobacco: Never Used   Substance and Sexual Activity   • Alcohol use: No   • Drug use: No   • Sexual activity: Not on file   Lifestyle   • Physical activity:     Days per week: Not on file     Minutes per session: Not on file   • Stress: Not on file   Relationships   • Social connections:     Talks on phone: Not on file     Gets together: Not on file     Attends Rastafari service: Not on file     Active member of club or organization: Not on file     Attends meetings of clubs or organizations: Not on file     Relationship status: Not on file   • Intimate partner violence:     Fear of current or ex partner: Not on file     Emotionally abused: Not  "on file     Physically abused: Not on file     Forced sexual activity: Not on file   Other Topics Concern   • Not on file   Social History Narrative   • Not on file      Family History   Problem Relation Age of Onset   • Stroke Neg Hx    • Heart Disease Neg Hx     Review of Systems   Constitutional: Positive for fever. Negative for chills.   HENT: Negative for sore throat.    Eyes: Negative for pain.   Respiratory: Positive for cough and wheezing. Negative for shortness of breath.    Cardiovascular: Negative for chest pain.   Gastrointestinal: Negative for nausea and vomiting.   Genitourinary: Negative for hematuria.   Musculoskeletal: Negative for myalgias.   Skin: Negative for rash.   Neurological: Negative for dizziness.   No Known Allergies   Objective:   /78   Pulse 80   Temp 36.8 °C (98.3 °F)   Resp 17   Ht 1.67 m (5' 5.75\")   Wt 64.9 kg (143 lb)   SpO2 98%   BMI 23.26 kg/m²   Physical Exam  Constitutional:       General: He is not in acute distress.     Appearance: He is well-developed.   HENT:      Head: Normocephalic and atraumatic.   Eyes:      Conjunctiva/sclera: Conjunctivae normal.      Pupils: Pupils are equal, round, and reactive to light.   Cardiovascular:      Rate and Rhythm: Normal rate and regular rhythm.      Heart sounds: No murmur.   Pulmonary:      Effort: Pulmonary effort is normal. No respiratory distress.      Breath sounds: No stridor. Wheezing present. No rhonchi or rales.   Abdominal:      General: There is no distension.      Palpations: Abdomen is soft.      Tenderness: There is no tenderness.   Skin:     General: Skin is warm and dry.   Neurological:      Mental Status: He is alert and oriented to person, place, and time.      Sensory: No sensory deficit.      Deep Tendon Reflexes: Reflexes are normal and symmetric.           Assessment/Plan:   Assessment    1. LRTI (lower respiratory tract infection)  - DX-CHEST-2 VIEWS; Future  - azithromycin (ZITHROMAX) 250 MG Tab; " Take 2 tablets by mouth on day one. Take one tablet by mouth the remaining days until gone  Dispense: 6 Tab; Refill: 0    Other orders  - sertraline (ZOLOFT) 50 MG Tab; Take 50 mg by mouth every day.  No acute CP Process on my read.   Differential diagnosis, natural history, supportive care, and indications for immediate follow-up discussed.

## 2019-12-03 NOTE — PATIENT INSTRUCTIONS

## 2019-12-10 ASSESSMENT — ENCOUNTER SYMPTOMS
CHILLS: 0
WHEEZING: 1
COUGH: 1
NAUSEA: 0
VOMITING: 0
FEVER: 1
SHORTNESS OF BREATH: 0
DIZZINESS: 0
EYE PAIN: 0
SORE THROAT: 0
MYALGIAS: 0

## 2020-08-15 ENCOUNTER — OFFICE VISIT (OUTPATIENT)
Dept: URGENT CARE | Facility: CLINIC | Age: 30
End: 2020-08-15
Payer: COMMERCIAL

## 2020-08-15 VITALS
TEMPERATURE: 98.6 F | DIASTOLIC BLOOD PRESSURE: 72 MMHG | SYSTOLIC BLOOD PRESSURE: 116 MMHG | WEIGHT: 140 LBS | OXYGEN SATURATION: 98 % | RESPIRATION RATE: 18 BRPM | BODY MASS INDEX: 23.32 KG/M2 | HEART RATE: 86 BPM | HEIGHT: 65 IN

## 2020-08-15 DIAGNOSIS — K04.7 DENTAL INFECTION: ICD-10-CM

## 2020-08-15 PROCEDURE — 99214 OFFICE O/P EST MOD 30 MIN: CPT | Performed by: PHYSICIAN ASSISTANT

## 2020-08-15 RX ORDER — AMOXICILLIN AND CLAVULANATE POTASSIUM 875; 125 MG/1; MG/1
1 TABLET, FILM COATED ORAL 2 TIMES DAILY
Qty: 14 TAB | Refills: 0 | Status: SHIPPED | OUTPATIENT
Start: 2020-08-15 | End: 2020-08-22

## 2020-08-15 ASSESSMENT — ENCOUNTER SYMPTOMS
VOMITING: 0
BLURRED VISION: 0
MYALGIAS: 0
HEADACHES: 0
ABDOMINAL PAIN: 0
NAUSEA: 0
COUGH: 0
SHORTNESS OF BREATH: 0
SORE THROAT: 0
PALPITATIONS: 0
DIZZINESS: 0
CHILLS: 0
SINUS PAIN: 0
DIARRHEA: 0
FEVER: 0
EYE PAIN: 0

## 2020-08-15 ASSESSMENT — FIBROSIS 4 INDEX: FIB4 SCORE: 0.67

## 2020-08-15 NOTE — PROGRESS NOTES
Subjective:      Gabriel Lemos is a 29 y.o. male who presents with Oral Swelling (x2 days, swelling of left cheek from tooth ache)      HPI:  This is a new problem. Gabriel Lemos is a 29 y.o. male who presents today for evaluation of dental pain.  Patient states he started develop a toothache 2 to 3 days ago.  Says the pain is on his left lower jawline.  He has not seen a dentist.  Denies any injury to the tooth.  States that he has been taking 800 mg ibuprofen every 6-8 hours without much relief.  Last night he states that he started swelling on the left side of his cheek that has persisted into today.  No trismus, difficulty swallowing, pain with neck movement, or fever/chills.      Review of Systems   Constitutional: Negative for chills, fever and malaise/fatigue.   HENT: Negative for congestion, ear pain, sinus pain and sore throat.         Dental pain   Eyes: Negative for blurred vision and pain.   Respiratory: Negative for cough and shortness of breath.    Cardiovascular: Negative for chest pain and palpitations.   Gastrointestinal: Negative for abdominal pain, diarrhea, nausea and vomiting.   Musculoskeletal: Negative for myalgias.   Skin: Negative for rash.   Neurological: Negative for dizziness and headaches.       PMH:  has no past medical history of Asthma, Cancer ([facility]), or Diabetes ([facility]).  MEDS:   Current Outpatient Medications:   •  sertraline (ZOLOFT) 50 MG Tab, Take 50 mg by mouth every day., Disp: , Rfl:   •  azithromycin (ZITHROMAX) 250 MG Tab, Take 2 tablets by mouth on day one. Take one tablet by mouth the remaining days until gone (Patient not taking: Reported on 8/15/2020), Disp: 6 Tab, Rfl: 0  •  ondansetron (ZOFRAN ODT) 4 MG TABLET DISPERSIBLE, Take 1 Tab by mouth every four hours as needed for Nausea (give PO if no IV route available). (Patient not taking: Reported on 8/15/2020), Disp: 15 Tab, Rfl: 0  •  famotidine (PEPCID) 20 MG Tab, Take 1 Tab by mouth 2 Times a Day. (Patient not taking:  "Reported on 8/15/2020), Disp: 60 Tab, Rfl: 0  ALLERGIES: No Known Allergies  SURGHX: No past surgical history on file.  SOCHX:  reports that he has quit smoking. His smoking use included cigarettes. He has a 1.50 pack-year smoking history. He has never used smokeless tobacco. He reports current alcohol use. He reports that he does not use drugs.  FH: Family history was reviewed, no pertinent findings to report     Objective:     /72   Pulse 86   Temp 37 °C (98.6 °F) (Temporal)   Resp 18   Ht 1.651 m (5' 5\")   Wt 63.5 kg (140 lb)   SpO2 98%   BMI 23.30 kg/m²      Physical Exam  Constitutional:       Appearance: He is well-developed.   HENT:      Head: Normocephalic and atraumatic.      Comments: Left cheek exhibits overlying soft tissue swelling without erythema or induration.  It is tender to palpation.     Right Ear: External ear normal.      Left Ear: External ear normal.      Mouth/Throat:      Lips: Pink.      Mouth: Mucous membranes are moist.      Dentition: Abnormal dentition. Dental tenderness and gingival swelling present. No dental abscesses.      Pharynx: Oropharynx is clear.     Eyes:      Conjunctiva/sclera: Conjunctivae normal.      Pupils: Pupils are equal, round, and reactive to light.   Neck:      Musculoskeletal: Normal range of motion.   Cardiovascular:      Rate and Rhythm: Normal rate and regular rhythm.      Heart sounds: Normal heart sounds. No murmur.   Pulmonary:      Effort: Pulmonary effort is normal.      Breath sounds: Normal breath sounds. No wheezing.   Lymphadenopathy:      Head:      Left side of head: Submental and submandibular adenopathy present.      Cervical: Cervical adenopathy present.      Left cervical: Superficial cervical adenopathy present.   Skin:     General: Skin is warm and dry.      Capillary Refill: Capillary refill takes less than 2 seconds.   Neurological:      Mental Status: He is alert and oriented to person, place, and time.   Psychiatric:         " Behavior: Behavior normal.         Judgment: Judgment normal.            Assessment/Plan:     1. Dental infection  - amoxicillin-clavulanate (AUGMENTIN) 875-125 MG Tab; Take 1 Tab by mouth 2 times a day for 7 days.  Dispense: 14 Tab; Refill: 0  - OTC analgesics  - Follow up with dentist          Differential Diagnosis, natural history, and supportive care discussed. Return to the Urgent Care or follow up with your PCP if symptoms fail to resolve, or for any new or worsening symptoms. Emergency room precautions discussed. Patient and/or family appears understanding of information.

## 2020-08-15 NOTE — PATIENT INSTRUCTIONS
Dental Abscess    A dental abscess is a collection of pus in or around a tooth that results from an infection. An abscess can cause pain in the affected area as well as other symptoms. Treatment is important to help with symptoms and to prevent the infection from spreading.  What are the causes?  This condition is caused by a bacterial infection around the root of the tooth that involves the inner part of the tooth (pulp). It may result from:  · Severe tooth decay.  · Trauma to the tooth, such as a broken or chipped tooth, that allows bacteria to enter into the pulp.  · Severe gum disease around a tooth.  What increases the risk?  This condition is more likely to develop in males. It is also more likely to develop in people who:  · Have dental decay (cavities).  · Eat sugary snacks between meals.  · Use tobacco products.  · Have diabetes.  · Have a weakened disease-fighting system (immune system).  · Do not brush and care for their teeth regularly.  What are the signs or symptoms?  Symptoms of this condition include:  · Severe pain in and around the infected tooth.  · Swelling and redness around the infected tooth, in the mouth, or in the face.  · Tenderness.  · Pus drainage.  · Bad breath.  · Bitter taste in the mouth.  · Difficulty swallowing.  · Difficulty opening the mouth.  · Nausea.  · Vomiting.  · Chills.  · Swollen neck glands.  · Fever.  How is this diagnosed?  This condition is diagnosed based on:  · Your symptoms and your medical and dental history.  · An examination of the infected tooth. During the exam, your dentist may tap on the infected tooth.  You may also have X-rays of the affected area.  How is this treated?  This condition is treated by getting rid of the infection. This may be done with:  · Incision and drainage. This procedure is done by making an incision in the abscess to drain out the pus. Removing pus is the first priority in treating an abscess.  · Antibiotic medicines. These may be used  in certain situations.  · Antibacterial mouth rinse.  · A root canal. This may be performed to save the tooth. Your dentist accesses the visible part of your tooth (crown) with a drill and removes any damaged pulp. Then the space is filled and sealed off.  · Tooth extraction. The tooth is pulled out if it cannot be saved by other treatment.  You may also receive treatment for pain, such as:  · Acetaminophen or NSAIDs.  · Gels that contain a numbing medicine.  · An injection to block the pain near your nerve.  Follow these instructions at home:  Medicines  · Take over-the-counter and prescription medicines only as told by your dentist.  · If you were prescribed an antibiotic, take it as told by your dentist. Do not stop taking the antibiotic even if you start to feel better.  · If you were prescribed a gel that contains a numbing medicine, use it exactly as told in the directions. Do not use these gels for children who are younger than 2 years of age.  · Do not drive or use heavy machinery while taking prescription pain medicine.  General instructions  · Rinse out your mouth often with salt water to relieve pain or swelling. To make a salt-water mixture, completely dissolve ½-1 tsp of salt in 1 cup of warm water.  · Eat a soft diet while your abscess is healing.  · Drink enough fluid to keep your urine pale yellow.  · Do not apply heat to the outside of your mouth.  · Do not use any products that contain nicotine or tobacco, such as cigarettes and e-cigarettes. If you need help quitting, ask your health care provider.  · Keep all follow-up visits as told by your dentist. This is important.  How is this prevented?  · Brush your teeth every morning and night with fluoride toothpaste. Floss one time each day.  · Get regularly scheduled dental cleanings.  · Consider having a dental sealant applied on teeth that have deep holes (caries).  · Drink fluoridated water regularly. This includes most tap water. Check the label  on bottled water to see if it contains fluoride.  · Drink water instead of sugary drinks.  · Eat healthy meals and snacks.  · Wear a mouth guard or face shield to protect your teeth while playing sports.  Contact a health care provider if:  · Your pain is worse and is not helped by medicine.  Get help right away if:  · You have a fever or chills.  · Your symptoms suddenly get worse.  · You have a very bad headache.  · You have problems breathing or swallowing.  · You have trouble opening your mouth.  · You have swelling in your neck or around your eye.  Summary  · A dental abscess is a collection of pus in or around a tooth that results from an infection.  · A dental abscess may result from severe tooth decay, trauma to the tooth, or severe gum disease around a tooth.  · Symptoms include severe pain, swelling, redness, and drainage of pus in and around the infected tooth.  · The first priority in treating a dental abscess is to drain out the pus. Treatment may also involve removing damage inside the tooth (root canal) or pulling out (extracting) the tooth.  This information is not intended to replace advice given to you by your health care provider. Make sure you discuss any questions you have with your health care provider.  Document Released: 12/18/2006 Document Revised: 11/30/2018 Document Reviewed: 08/20/2018  Elsevier Patient Education © 2020 [employer]

## 2020-10-17 ENCOUNTER — HOSPITAL ENCOUNTER (OUTPATIENT)
Facility: MEDICAL CENTER | Age: 30
End: 2020-10-17
Payer: COMMERCIAL

## 2020-10-18 LAB
COVID ORDER STATUS COVID19: NORMAL
SARS-COV-2 RNA RESP QL NAA+PROBE: NOTDETECTED
SPECIMEN SOURCE: NORMAL

## 2020-11-05 ENCOUNTER — APPOINTMENT (RX ONLY)
Dept: URBAN - METROPOLITAN AREA CLINIC 4 | Facility: CLINIC | Age: 30
Setting detail: DERMATOLOGY
End: 2020-11-05

## 2020-11-05 DIAGNOSIS — D22 MELANOCYTIC NEVI: ICD-10-CM

## 2020-11-05 DIAGNOSIS — L81.4 OTHER MELANIN HYPERPIGMENTATION: ICD-10-CM

## 2020-11-05 DIAGNOSIS — L82.1 OTHER SEBORRHEIC KERATOSIS: ICD-10-CM

## 2020-11-05 DIAGNOSIS — Z71.89 OTHER SPECIFIED COUNSELING: ICD-10-CM

## 2020-11-05 PROBLEM — D22.5 MELANOCYTIC NEVI OF TRUNK: Status: ACTIVE | Noted: 2020-11-05

## 2020-11-05 PROBLEM — D22.62 MELANOCYTIC NEVI OF LEFT UPPER LIMB, INCLUDING SHOULDER: Status: ACTIVE | Noted: 2020-11-05

## 2020-11-05 PROBLEM — D22.72 MELANOCYTIC NEVI OF LEFT LOWER LIMB, INCLUDING HIP: Status: ACTIVE | Noted: 2020-11-05

## 2020-11-05 PROBLEM — D22.39 MELANOCYTIC NEVI OF OTHER PARTS OF FACE: Status: ACTIVE | Noted: 2020-11-05

## 2020-11-05 PROBLEM — D22.61 MELANOCYTIC NEVI OF RIGHT UPPER LIMB, INCLUDING SHOULDER: Status: ACTIVE | Noted: 2020-11-05

## 2020-11-05 PROBLEM — D22.4 MELANOCYTIC NEVI OF SCALP AND NECK: Status: ACTIVE | Noted: 2020-11-05

## 2020-11-05 PROBLEM — D22.71 MELANOCYTIC NEVI OF RIGHT LOWER LIMB, INCLUDING HIP: Status: ACTIVE | Noted: 2020-11-05

## 2020-11-05 PROCEDURE — ? COUNSELING

## 2020-11-05 ASSESSMENT — LOCATION DETAILED DESCRIPTION DERM
LOCATION DETAILED: SUPERIOR THORACIC SPINE
LOCATION DETAILED: MIDDLE STERNUM
LOCATION DETAILED: MID-FRONTAL SCALP
LOCATION DETAILED: LEFT INFERIOR MEDIAL FOREHEAD
LOCATION DETAILED: LEFT MID TEMPLE
LOCATION DETAILED: LEFT PROXIMAL DORSAL FOREARM
LOCATION DETAILED: RIGHT PROXIMAL POSTERIOR THIGH
LOCATION DETAILED: RIGHT PROXIMAL DORSAL FOREARM
LOCATION DETAILED: LEFT DISTAL POSTERIOR THIGH
LOCATION DETAILED: LEFT INFERIOR CENTRAL MALAR CHEEK

## 2020-11-05 ASSESSMENT — LOCATION SIMPLE DESCRIPTION DERM
LOCATION SIMPLE: LEFT FOREARM
LOCATION SIMPLE: UPPER BACK
LOCATION SIMPLE: ANTERIOR SCALP
LOCATION SIMPLE: LEFT POSTERIOR THIGH
LOCATION SIMPLE: LEFT CHEEK
LOCATION SIMPLE: LEFT TEMPLE
LOCATION SIMPLE: RIGHT POSTERIOR THIGH
LOCATION SIMPLE: RIGHT FOREARM
LOCATION SIMPLE: CHEST
LOCATION SIMPLE: LEFT FOREHEAD

## 2020-11-05 ASSESSMENT — LOCATION ZONE DERM
LOCATION ZONE: TRUNK
LOCATION ZONE: SCALP
LOCATION ZONE: FACE
LOCATION ZONE: LEG
LOCATION ZONE: ARM

## 2021-03-05 ENCOUNTER — TELEPHONE (OUTPATIENT)
Dept: SCHEDULING | Facility: IMAGING CENTER | Age: 31
End: 2021-03-05

## 2021-05-08 ENCOUNTER — OFFICE VISIT (OUTPATIENT)
Dept: URGENT CARE | Facility: CLINIC | Age: 31
End: 2021-05-08
Payer: COMMERCIAL

## 2021-05-08 VITALS
OXYGEN SATURATION: 99 % | WEIGHT: 138.4 LBS | RESPIRATION RATE: 14 BRPM | TEMPERATURE: 98.4 F | SYSTOLIC BLOOD PRESSURE: 110 MMHG | HEIGHT: 65 IN | DIASTOLIC BLOOD PRESSURE: 70 MMHG | BODY MASS INDEX: 23.06 KG/M2 | HEART RATE: 88 BPM

## 2021-05-08 DIAGNOSIS — K04.7 TOOTH ABSCESS: ICD-10-CM

## 2021-05-08 PROCEDURE — 99213 OFFICE O/P EST LOW 20 MIN: CPT | Performed by: FAMILY MEDICINE

## 2021-05-08 RX ORDER — PENICILLIN V POTASSIUM 500 MG/1
500 TABLET ORAL 3 TIMES DAILY
Qty: 30 TABLET | Refills: 0 | Status: SHIPPED | OUTPATIENT
Start: 2021-05-08 | End: 2021-05-18

## 2021-05-08 RX ORDER — CHLORHEXIDINE GLUCONATE ORAL RINSE 1.2 MG/ML
15 SOLUTION DENTAL 2 TIMES DAILY
Qty: 473 ML | Refills: 0 | Status: SHIPPED | OUTPATIENT
Start: 2021-05-08 | End: 2021-05-28

## 2021-05-08 NOTE — PROGRESS NOTES
"Subjective:      aGbriel Lemos is a 30 y.o. male who presents with Facial Swelling (Facial swelling, left side of face, onset yesterday.)            This is a new problem.  30-year-old presenting for evaluation of left-sided facial swelling since yesterday.  Denies any fever chills, nausea vomiting.  Has made an appointment to see dentist in couple weeks.  Review of system otherwise negative      ROS       Objective:     /70 (BP Location: Left arm, Patient Position: Sitting, BP Cuff Size: Adult)   Pulse 88   Temp 36.9 °C (98.4 °F) (Temporal)   Resp 14   Ht 1.651 m (5' 5\")   Wt 62.8 kg (138 lb 6.4 oz)   SpO2 99%   BMI 23.03 kg/m²      Physical Exam  Constitutional:       General: He is not in acute distress.     Appearance: He is not toxic-appearing or diaphoretic.   HENT:      Head: Atraumatic.        Comments: Soft tissue swelling noted on the outlined area, no erythema.     Right Ear: External ear normal.      Left Ear: External ear normal.      Nose: Nose normal.      Mouth/Throat:      Mouth: Mucous membranes are moist.      Dentition: Abnormal dentition. Gingival swelling, dental caries and dental abscesses present.      Pharynx: Oropharynx is clear. Uvula midline. No uvula swelling.     Eyes:      Conjunctiva/sclera: Conjunctivae normal.   Pulmonary:      Effort: Pulmonary effort is normal. No respiratory distress.      Breath sounds: No stridor.   Musculoskeletal:      Cervical back: Neck supple.   Lymphadenopathy:      Cervical: No cervical adenopathy.   Skin:     General: Skin is warm.      Coloration: Skin is not jaundiced or pale.   Neurological:      Mental Status: He is oriented to person, place, and time.   Psychiatric:         Thought Content: Thought content normal.                 Assessment/Plan:        1. Tooth abscess  - penicillin v potassium (VEETID) 500 MG Tab; Take 1 tablet by mouth 3 times a day for 10 days.  Dispense: 30 tablet; Refill: 0  - chlorhexidine (PERIDEX) 0.12 % " Solution; Take 15 mL by mouth 2 times a day.  Dispense: 473 mL; Refill: 0      Continue symptomatic care  Plan per orders and instructions  Warning signs reviewed  Follow-up with dentist, sooner if any worsening or new symptoms.  Discussed if there is any worsening while on antibiotic patient is to go to local emergency department.

## 2021-05-08 NOTE — PATIENT INSTRUCTIONS
Follow up if not significantly improved as expected, sooner if any worsening or new symptoms    If there is any worsening or new symptoms while on oral antibiotic please go to local emergency department.    Dental Abscess    A dental abscess is an area of pus in or around a tooth. It comes from an infection. It can cause pain and other symptoms. Treatment will help with symptoms and prevent the infection from spreading.  Follow these instructions at home:  Medicines  · Take over-the-counter and prescription medicines only as told by your dentist.  · If you were prescribed an antibiotic medicine, take it as told by your dentist. Do not stop taking it even if you start to feel better.  · If you were prescribed a gel that has numbing medicine in it, use it exactly as told.  · Do not drive or use heavy machinery (like a ) while taking prescription pain medicine.  General instructions  · Rinse out your mouth often with salt water.  ? To make salt water, dissolve ½-1 tsp of salt in 1 cup of warm water.  · Eat a soft diet while your mouth is healing.  · Drink enough fluid to keep your urine pale yellow.  · Do not apply heat to the outside of your mouth.  · Do not use any products that contain nicotine or tobacco. These include cigarettes and e-cigarettes. If you need help quitting, ask your doctor.  · Keep all follow-up visits as told by your dentist. This is important.  Prevent an abscess  · Brush your teeth every morning and every night. Use fluoride toothpaste.  · Floss your teeth each day.  · Get dental cleanings as often as told by your dentist.  · Think about getting dental sealant put on teeth that have deep holes (decay).  · Drink water that has fluoride in it.  ? Most tap water has fluoride.  ? Check the label on bottled water to see if it has fluoride in it.  · Drink water instead of sugary drinks.  · Eat healthy meals and snacks.  · Wear a mouth guard or face shield when you play sports.  Contact a  doctor if:  · Your pain is worse, and medicine does not help.  Get help right away if:  · You have a fever or chills.  · Your symptoms suddenly get worse.  · You have a very bad headache.  · You have problems breathing or swallowing.  · You have trouble opening your mouth.  · You have swelling in your neck or close to your eye.  Summary  · A dental abscess is an area of pus in or around a tooth. It is caused by an infection.  · Treatment will help with symptoms and prevent the infection from spreading.  · Take over-the-counter and prescription medicines only as told by your dentist.  · To prevent an abscess, take good care of your teeth. Brush your teeth every morning and night. Use floss every day.  · Get dental cleanings as often as told by your dentist.  This information is not intended to replace advice given to you by your health care provider. Make sure you discuss any questions you have with your health care provider.  Document Released: 05/03/2016 Document Revised: 04/08/2020 Document Reviewed: 08/20/2018  Elsevier Patient Education © 2020 [employer]

## 2021-05-12 ENCOUNTER — TELEPHONE (OUTPATIENT)
Dept: SCHEDULING | Facility: IMAGING CENTER | Age: 31
End: 2021-05-12

## 2021-05-28 ENCOUNTER — OFFICE VISIT (OUTPATIENT)
Dept: MEDICAL GROUP | Facility: MEDICAL CENTER | Age: 31
End: 2021-05-28
Payer: COMMERCIAL

## 2021-05-28 VITALS
HEIGHT: 65 IN | DIASTOLIC BLOOD PRESSURE: 78 MMHG | TEMPERATURE: 97.2 F | OXYGEN SATURATION: 95 % | SYSTOLIC BLOOD PRESSURE: 112 MMHG | HEART RATE: 73 BPM | BODY MASS INDEX: 22.82 KG/M2 | WEIGHT: 137 LBS

## 2021-05-28 DIAGNOSIS — M54.42 CHRONIC BILATERAL LOW BACK PAIN WITH BILATERAL SCIATICA: ICD-10-CM

## 2021-05-28 DIAGNOSIS — Z83.3 FAMILY HISTORY OF DIABETES MELLITUS: ICD-10-CM

## 2021-05-28 DIAGNOSIS — R68.89 COLD INTOLERANCE: ICD-10-CM

## 2021-05-28 DIAGNOSIS — G89.29 CHRONIC BILATERAL LOW BACK PAIN WITH BILATERAL SCIATICA: ICD-10-CM

## 2021-05-28 DIAGNOSIS — F41.8 MIXED ANXIETY DEPRESSIVE DISORDER: ICD-10-CM

## 2021-05-28 DIAGNOSIS — M54.41 CHRONIC BILATERAL LOW BACK PAIN WITH BILATERAL SCIATICA: ICD-10-CM

## 2021-05-28 DIAGNOSIS — E05.90 SUBCLINICAL HYPERTHYROIDISM: ICD-10-CM

## 2021-05-28 DIAGNOSIS — Z00.00 ANNUAL PHYSICAL EXAM: ICD-10-CM

## 2021-05-28 PROBLEM — A41.9 SEPSIS (HCC): Status: RESOLVED | Noted: 2018-09-22 | Resolved: 2021-05-28

## 2021-05-28 PROBLEM — R79.89 ABNORMAL SERUM THYROID STIMULATING HORMONE (TSH) LEVEL: Status: ACTIVE | Noted: 2017-05-19

## 2021-05-28 PROBLEM — G47.00 INSOMNIA: Status: ACTIVE | Noted: 2017-05-12

## 2021-05-28 PROBLEM — R11.2 N&V (NAUSEA AND VOMITING): Status: RESOLVED | Noted: 2018-09-22 | Resolved: 2021-05-28

## 2021-05-28 PROBLEM — M41.9 SCOLIOSIS: Status: ACTIVE | Noted: 2017-04-26

## 2021-05-28 PROBLEM — K22.3 ESOPHAGEAL RUPTURE: Status: RESOLVED | Noted: 2018-09-23 | Resolved: 2021-05-28

## 2021-05-28 PROBLEM — J98.2 PNEUMOMEDIASTINUM (HCC): Status: RESOLVED | Noted: 2018-09-22 | Resolved: 2021-05-28

## 2021-05-28 PROBLEM — M54.50 LOW BACK PAIN: Status: ACTIVE | Noted: 2017-05-12

## 2021-05-28 PROBLEM — D75.839 THROMBOCYTOSIS: Status: RESOLVED | Noted: 2018-10-19 | Resolved: 2021-05-28

## 2021-05-28 PROBLEM — D75.839 THROMBOCYTOSIS: Status: ACTIVE | Noted: 2018-10-19

## 2021-05-28 PROBLEM — J30.2 SEASONAL ALLERGIES: Status: ACTIVE | Noted: 2017-09-20

## 2021-05-28 PROBLEM — Z72.0 TOBACCO ABUSE: Status: RESOLVED | Noted: 2018-09-23 | Resolved: 2021-05-28

## 2021-05-28 PROBLEM — J96.01 ACUTE RESPIRATORY FAILURE WITH HYPOXIA (HCC): Status: RESOLVED | Noted: 2018-09-23 | Resolved: 2021-05-28

## 2021-05-28 PROCEDURE — 99214 OFFICE O/P EST MOD 30 MIN: CPT | Performed by: NURSE PRACTITIONER

## 2021-05-28 SDOH — HEALTH STABILITY: PHYSICAL HEALTH: ON AVERAGE, HOW MANY MINUTES DO YOU ENGAGE IN EXERCISE AT THIS LEVEL?: 60 MINUTES

## 2021-05-28 SDOH — ECONOMIC STABILITY: TRANSPORTATION INSECURITY
IN THE PAST 12 MONTHS, HAS THE LACK OF TRANSPORTATION KEPT YOU FROM MEDICAL APPOINTMENTS OR FROM GETTING MEDICATIONS?: NO

## 2021-05-28 SDOH — ECONOMIC STABILITY: HOUSING INSECURITY
IN THE LAST 12 MONTHS, WAS THERE A TIME WHEN YOU DID NOT HAVE A STEADY PLACE TO SLEEP OR SLEPT IN A SHELTER (INCLUDING NOW)?: NO

## 2021-05-28 SDOH — HEALTH STABILITY: MENTAL HEALTH
STRESS IS WHEN SOMEONE FEELS TENSE, NERVOUS, ANXIOUS, OR CAN'T SLEEP AT NIGHT BECAUSE THEIR MIND IS TROUBLED. HOW STRESSED ARE YOU?: TO SOME EXTENT

## 2021-05-28 SDOH — ECONOMIC STABILITY: INCOME INSECURITY: IN THE LAST 12 MONTHS, WAS THERE A TIME WHEN YOU WERE NOT ABLE TO PAY THE MORTGAGE OR RENT ON TIME?: NO

## 2021-05-28 SDOH — ECONOMIC STABILITY: HOUSING INSECURITY: IN THE LAST 12 MONTHS, HOW MANY PLACES HAVE YOU LIVED?: 1

## 2021-05-28 SDOH — ECONOMIC STABILITY: TRANSPORTATION INSECURITY
IN THE PAST 12 MONTHS, HAS LACK OF RELIABLE TRANSPORTATION KEPT YOU FROM MEDICAL APPOINTMENTS, MEETINGS, WORK OR FROM GETTING THINGS NEEDED FOR DAILY LIVING?: NO

## 2021-05-28 SDOH — HEALTH STABILITY: PHYSICAL HEALTH: ON AVERAGE, HOW MANY DAYS PER WEEK DO YOU ENGAGE IN MODERATE TO STRENUOUS EXERCISE (LIKE A BRISK WALK)?: 5 DAYS

## 2021-05-28 ASSESSMENT — SOCIAL DETERMINANTS OF HEALTH (SDOH)
HOW OFTEN DO YOU GET TOGETHER WITH FRIENDS OR RELATIVES?: NEVER
HOW MANY DRINKS CONTAINING ALCOHOL DO YOU HAVE ON A TYPICAL DAY WHEN YOU ARE DRINKING: PATIENT DECLINED
HOW OFTEN DO YOU HAVE SIX OR MORE DRINKS ON ONE OCCASION: LESS THAN MONTHLY
WITHIN THE PAST 12 MONTHS, THE FOOD YOU BOUGHT JUST DIDN'T LAST AND YOU DIDN'T HAVE MONEY TO GET MORE: SOMETIMES TRUE
HOW OFTEN DO YOU ATTEND CHURCH OR RELIGIOUS SERVICES?: MORE THAN 4 TIMES PER YEAR
DO YOU BELONG TO ANY CLUBS OR ORGANIZATIONS SUCH AS CHURCH GROUPS UNIONS, FRATERNAL OR ATHLETIC GROUPS, OR SCHOOL GROUPS?: YES
WITHIN THE PAST 12 MONTHS, YOU WORRIED THAT YOUR FOOD WOULD RUN OUT BEFORE YOU GOT THE MONEY TO BUY MORE: SOMETIMES TRUE
HOW OFTEN DO YOU HAVE A DRINK CONTAINING ALCOHOL: MONTHLY OR LESS
IN A TYPICAL WEEK, HOW MANY TIMES DO YOU TALK ON THE PHONE WITH FAMILY, FRIENDS, OR NEIGHBORS?: ONCE A WEEK
HOW OFTEN DO YOU ATTENT MEETINGS OF THE CLUB OR ORGANIZATION YOU BELONG TO?: NEVER

## 2021-05-28 ASSESSMENT — PATIENT HEALTH QUESTIONNAIRE - PHQ9
CLINICAL INTERPRETATION OF PHQ2 SCORE: 2
SUM OF ALL RESPONSES TO PHQ QUESTIONS 1-9: 8
5. POOR APPETITE OR OVEREATING: 0 - NOT AT ALL

## 2021-05-28 NOTE — ASSESSMENT & PLAN NOTE
Chronic. Diagnosed with scoliosis as a child. Has seen PT in the past extensively as well as orthopedic specialist at the [facility] who did trigger point injections. Has had MRI of spine which showed lumbar lordosis, no scoliosis on exam. Didn't feel trigger points were helpful. Didn't feel PT was helpful. Was on lyrica as well which was not helpful. Doesn't like to take a lot of NSAIDs as they upset his stomach and he has GI issues already.

## 2021-05-28 NOTE — PROGRESS NOTES
Gabriel Lemos is a 30 y.o. male here to establish care and discuss the following:    HPI:   Cold intolerance  Ongoing for years. Feels overly cold. Having to wear blankets and socks. He is generally the only one cold. Has had some abnormal thyroid tests in the past. Requesting labs.     Subclinical hyperthyroidism  Diagnosed on labs during hospitalization, also had low TSH in 2017. Has not had labs done since 2018. Does report hot and cold flashes. Worsening anxiety and depression.     Does have some male patterned hair loss, hasn't noticed abnormal thinning.     Does have some constipation. No diarrhea. Does get excessively dry skin.     Low back pain  Chronic. Diagnosed with scoliosis as a child. Has seen PT in the past extensively as well as orthopedic specialist at the [facility] who did trigger point injections. Has had MRI of spine which showed lumbar lordosis, no scoliosis on exam. Didn't feel trigger points were helpful. Didn't feel PT was helpful. Was on lyrica as well which was not helpful. Doesn't like to take a lot of NSAIDs as they upset his stomach and he has GI issues already.     Current medicines (including changes today)  Current Outpatient Medications   Medication Sig Dispense Refill   • sertraline (ZOLOFT) 50 MG Tab Take 1 tablet by mouth 2 times a day. 60 tablet 6     No current facility-administered medications for this visit.     He  has no past medical history on file.  He  has no past surgical history on file.  Social History     Tobacco Use   • Smoking status: Former Smoker     Packs/day: 0.50     Years: 3.00     Pack years: 1.50     Types: Cigarettes   • Smokeless tobacco: Never Used   Vaping Use   • Vaping Use: Never used   Substance Use Topics   • Alcohol use: Yes     Comment: rare   • Drug use: No     Social History     Social History Narrative   • Not on file     Family History   Problem Relation Age of Onset   • No Known Problems Mother    • Diabetes Maternal Grandfather    • Stroke Neg Hx   "  • Heart Disease Neg Hx      Family Status   Relation Name Status   • Mo  Alive   • Fa unknown Other   • MGFa     • Neg Hx  (Not Specified)         ROS  No chest pain, no abdominal pain, no rash.  Positive ROS as per HPI.  All other systems reviewed and are negative      Objective:     /78 (BP Location: Right arm, Patient Position: Sitting, BP Cuff Size: Adult)   Pulse 73   Temp 36.2 °C (97.2 °F) (Temporal)   Ht 1.651 m (5' 5\")   Wt 62.1 kg (137 lb)   SpO2 95%  Body mass index is 22.8 kg/m².  Physical Exam:    Constitutional: Alert, no distress.  Skin: Warm, dry, good turgor, no rashes in visible areas.  Eye: Equal, round and reactive, conjunctiva clear, lids normal.  ENMT: Mask in place  Respiratory: Unlabored respiratory effort,  Psych: Alert and oriented x3, anxious affect and mood.      Assessment and Plan:   The following treatment plan was discussed    1. Mixed anxiety depressive disorder  Unstable  Continue sertraline 50 mg BID  Check labs, if not hyperthyroid will adjust medications  - sertraline (ZOLOFT) 50 MG Tab; Take 1 tablet by mouth 2 times a day.  Dispense: 60 tablet; Refill: 6    2. Subclinical hyperthyroidism  Due for repeat labs  - TSH; Future  - FREE THYROXINE; Future  - TRIIDOTHYRONINE; Future  - ANTITHYROGLOBULIN AB; Future  - THYROID PEROXIDASE  (TPO) AB; Future    3. Cold intolerance  Unstable  Check labs    4. Chronic bilateral low back pain with bilateral sciatica  Unstable  Has failed lyrica, PT, trigger point injections, previously seen by [facility]  Follow up with physiatry  - REFERRAL TO OUTPATIENT INTERVENTIONAL PAIN CLINIC    5. Family history of diabetes mellitus  - HEMOGLOBIN A1C; Future    6. Annual physical exam  - CBC WITH DIFFERENTIAL; Future  - Comp Metabolic Panel; Future  - HEMOGLOBIN A1C; Future  - Lipid Profile; Future  - TSH; Future  - FREE THYROXINE; Future  - VITAMIN D,25 HYDROXY; Future  - TRIIDOTHYRONINE; Future  - ANTITHYROGLOBULIN AB; Future  - THYROID " PEROXIDASE  (TPO) AB; Future      Followup: Return in about 4 weeks (around 6/25/2021) for Lab Review.    I have placed the below orders and discussed them with an approved delegating provider. The MA is performing the below orders under the direction of Dr. Livingston

## 2021-05-28 NOTE — ASSESSMENT & PLAN NOTE
Diagnosed on labs during hospitalization, also had low TSH in 2017. Has not had labs done since 2018. Does report hot and cold flashes. Worsening anxiety and depression.     Does have some male patterned hair loss, hasn't noticed abnormal thinning.     Does have some constipation. No diarrhea. Does get excessively dry skin.

## 2021-05-28 NOTE — ASSESSMENT & PLAN NOTE
Ongoing for years. Feels overly cold. Having to wear blankets and socks. He is generally the only one cold. Has had some abnormal thyroid tests in the past. Requesting labs.

## 2021-06-04 ENCOUNTER — HOSPITAL ENCOUNTER (OUTPATIENT)
Dept: LAB | Facility: MEDICAL CENTER | Age: 31
End: 2021-06-04
Attending: NURSE PRACTITIONER
Payer: COMMERCIAL

## 2021-06-04 DIAGNOSIS — Z83.3 FAMILY HISTORY OF DIABETES MELLITUS: ICD-10-CM

## 2021-06-04 DIAGNOSIS — Z00.00 ANNUAL PHYSICAL EXAM: ICD-10-CM

## 2021-06-04 DIAGNOSIS — E05.90 SUBCLINICAL HYPERTHYROIDISM: ICD-10-CM

## 2021-06-04 LAB
ALBUMIN SERPL BCP-MCNC: 4.7 G/DL (ref 3.2–4.9)
ALBUMIN/GLOB SERPL: 1.5 G/DL
ALP SERPL-CCNC: 73 U/L (ref 30–99)
ALT SERPL-CCNC: 24 U/L (ref 2–50)
ANION GAP SERPL CALC-SCNC: 10 MMOL/L (ref 7–16)
AST SERPL-CCNC: 36 U/L (ref 12–45)
BASOPHILS # BLD AUTO: 0.2 % (ref 0–1.8)
BASOPHILS # BLD: 0.01 K/UL (ref 0–0.12)
BILIRUB SERPL-MCNC: 0.8 MG/DL (ref 0.1–1.5)
BUN SERPL-MCNC: 10 MG/DL (ref 8–22)
CALCIUM SERPL-MCNC: 9.5 MG/DL (ref 8.5–10.5)
CHLORIDE SERPL-SCNC: 104 MMOL/L (ref 96–112)
CHOLEST SERPL-MCNC: 192 MG/DL (ref 100–199)
CO2 SERPL-SCNC: 24 MMOL/L (ref 20–33)
CREAT SERPL-MCNC: 0.96 MG/DL (ref 0.5–1.4)
EOSINOPHIL # BLD AUTO: 0.03 K/UL (ref 0–0.51)
EOSINOPHIL NFR BLD: 0.7 % (ref 0–6.9)
ERYTHROCYTE [DISTWIDTH] IN BLOOD BY AUTOMATED COUNT: 38.5 FL (ref 35.9–50)
EST. AVERAGE GLUCOSE BLD GHB EST-MCNC: 105 MG/DL
GLOBULIN SER CALC-MCNC: 3.1 G/DL (ref 1.9–3.5)
GLUCOSE SERPL-MCNC: 90 MG/DL (ref 65–99)
HBA1C MFR BLD: 5.3 % (ref 4–5.6)
HCT VFR BLD AUTO: 45.6 % (ref 42–52)
HDLC SERPL-MCNC: 57 MG/DL
HGB BLD-MCNC: 16.1 G/DL (ref 14–18)
IMM GRANULOCYTES # BLD AUTO: 0.01 K/UL (ref 0–0.11)
IMM GRANULOCYTES NFR BLD AUTO: 0.2 % (ref 0–0.9)
LDLC SERPL CALC-MCNC: 126 MG/DL
LYMPHOCYTES # BLD AUTO: 2.36 K/UL (ref 1–4.8)
LYMPHOCYTES NFR BLD: 54.8 % (ref 22–41)
MCH RBC QN AUTO: 31 PG (ref 27–33)
MCHC RBC AUTO-ENTMCNC: 35.3 G/DL (ref 33.7–35.3)
MCV RBC AUTO: 87.7 FL (ref 81.4–97.8)
MONOCYTES # BLD AUTO: 0.36 K/UL (ref 0–0.85)
MONOCYTES NFR BLD AUTO: 8.4 % (ref 0–13.4)
NEUTROPHILS # BLD AUTO: 1.54 K/UL (ref 1.82–7.42)
NEUTROPHILS NFR BLD: 35.7 % (ref 44–72)
NRBC # BLD AUTO: 0 K/UL
NRBC BLD-RTO: 0 /100 WBC
PLATELET # BLD AUTO: 267 K/UL (ref 164–446)
PMV BLD AUTO: 10.3 FL (ref 9–12.9)
POTASSIUM SERPL-SCNC: 4.2 MMOL/L (ref 3.6–5.5)
PROT SERPL-MCNC: 7.8 G/DL (ref 6–8.2)
RBC # BLD AUTO: 5.2 M/UL (ref 4.7–6.1)
SODIUM SERPL-SCNC: 138 MMOL/L (ref 135–145)
T3 SERPL-MCNC: 82.4 NG/DL (ref 60–181)
T4 FREE SERPL-MCNC: 1.25 NG/DL (ref 0.93–1.7)
TRIGL SERPL-MCNC: 45 MG/DL (ref 0–149)
TSH SERPL DL<=0.005 MIU/L-ACNC: 0.26 UIU/ML (ref 0.38–5.33)
WBC # BLD AUTO: 4.3 K/UL (ref 4.8–10.8)

## 2021-06-04 PROCEDURE — 84443 ASSAY THYROID STIM HORMONE: CPT

## 2021-06-04 PROCEDURE — 80053 COMPREHEN METABOLIC PANEL: CPT

## 2021-06-04 PROCEDURE — 83036 HEMOGLOBIN GLYCOSYLATED A1C: CPT

## 2021-06-04 PROCEDURE — 84480 ASSAY TRIIODOTHYRONINE (T3): CPT

## 2021-06-04 PROCEDURE — 86800 THYROGLOBULIN ANTIBODY: CPT

## 2021-06-04 PROCEDURE — 86376 MICROSOMAL ANTIBODY EACH: CPT

## 2021-06-04 PROCEDURE — 85025 COMPLETE CBC W/AUTO DIFF WBC: CPT

## 2021-06-04 PROCEDURE — 82306 VITAMIN D 25 HYDROXY: CPT

## 2021-06-04 PROCEDURE — 80061 LIPID PANEL: CPT

## 2021-06-04 PROCEDURE — 36415 COLL VENOUS BLD VENIPUNCTURE: CPT

## 2021-06-04 PROCEDURE — 84439 ASSAY OF FREE THYROXINE: CPT

## 2021-06-07 LAB
25(OH)D3 SERPL-MCNC: 27 NG/ML (ref 30–80)
THYROGLOB AB SERPL-ACNC: <0.9 IU/ML (ref 0–4)
THYROPEROXIDASE AB SERPL-ACNC: <0.3 IU/ML (ref 0–9)

## 2021-06-11 ENCOUNTER — TELEPHONE (OUTPATIENT)
Dept: MEDICAL GROUP | Facility: MEDICAL CENTER | Age: 31
End: 2021-06-11

## 2021-06-11 DIAGNOSIS — E05.90 SUBCLINICAL HYPERTHYROIDISM: ICD-10-CM

## 2021-06-11 DIAGNOSIS — R68.89 COLD INTOLERANCE: ICD-10-CM

## 2021-06-11 DIAGNOSIS — F41.8 MIXED ANXIETY DEPRESSIVE DISORDER: ICD-10-CM

## 2021-06-11 DIAGNOSIS — G47.01 INSOMNIA DUE TO MEDICAL CONDITION: ICD-10-CM

## 2021-06-11 NOTE — TELEPHONE ENCOUNTER
TSH continues to show subclinical hyperthyroidism. Referral placed for endocrinology for further evaluation and management.     DERRELL Jones.

## 2021-06-18 ENCOUNTER — OFFICE VISIT (OUTPATIENT)
Dept: MEDICAL GROUP | Facility: MEDICAL CENTER | Age: 31
End: 2021-06-18
Payer: COMMERCIAL

## 2021-06-18 VITALS
DIASTOLIC BLOOD PRESSURE: 70 MMHG | OXYGEN SATURATION: 97 % | BODY MASS INDEX: 22.33 KG/M2 | HEIGHT: 65 IN | HEART RATE: 74 BPM | SYSTOLIC BLOOD PRESSURE: 120 MMHG | TEMPERATURE: 99.5 F | WEIGHT: 134 LBS

## 2021-06-18 DIAGNOSIS — R68.89 COLD INTOLERANCE: ICD-10-CM

## 2021-06-18 DIAGNOSIS — G47.00 INSOMNIA, UNSPECIFIED TYPE: ICD-10-CM

## 2021-06-18 DIAGNOSIS — E05.90 SUBCLINICAL HYPERTHYROIDISM: ICD-10-CM

## 2021-06-18 DIAGNOSIS — F41.8 MIXED ANXIETY DEPRESSIVE DISORDER: ICD-10-CM

## 2021-06-18 PROCEDURE — 99214 OFFICE O/P EST MOD 30 MIN: CPT | Performed by: NURSE PRACTITIONER

## 2021-06-18 RX ORDER — HYDROXYZINE HYDROCHLORIDE 25 MG/1
12.5-25 TABLET, FILM COATED ORAL 2 TIMES DAILY PRN
Qty: 60 TABLET | Refills: 1 | Status: SHIPPED | OUTPATIENT
Start: 2021-06-18 | End: 2021-07-21

## 2021-06-18 ASSESSMENT — FIBROSIS 4 INDEX: FIB4 SCORE: 0.83

## 2021-06-18 NOTE — PROGRESS NOTES
Subjective:   Gabriel Lemos is a 30 y.o. male here today for lab and anxiety follow up    Mixed anxiety depressive disorder  Chronic. Worsening. Has been on sertraline 50 mg twice daily for 4-5 years. Was improved initially. But has been worse over the past year or so.     Has had a lot of family stress, 3 deaths in the family due to COVID over the past 6 months.     Has never seen therapy or psychiatry in the past. Has been keeping himself busy and walking. Good support with wife.     Sertraline 50 mg BID was was refilled about 3 weeks ago at new patient appointment, however today it was revealed that he had been out of his medication since 2018.     Depression Screen (PHQ-2/PHQ-9) 5/28/2021   PHQ-2 Total Score -   PHQ-2 Total Score 2   PHQ-9 Total Score 8       Interpretation of PHQ-9 Total Score   Score Severity     5-9 Mild Depression           Subclinical hyperthyroidism  Diagnosed on labs during hospitalization in 2018 for pneumonia, also had low TSH in 2017. Repeat labs done a few weeks ago show continued low TSH. Does report hot and cold flashes. Worsening anxiety and depression. Insomnia.     Does have some male patterned hair loss, hasn't noticed abnormal thinning.     Does have some constipation. No diarrhea. Does get excessively dry skin.     Has thyroid US in 2017 which was normal, homogenous, no nodules. Did have some reactive lymph nodes.     He has been referred to Endocrinology for further evaluation.     Insomnia  Chronic. Waking up in the night and unable to fall asleep. Taking melatonin on his days off as it causes some daytime grogginess. Has not taking anything else for this OTC or RX.        Current medicines (including changes today)  Current Outpatient Medications   Medication Sig Dispense Refill   • sertraline (ZOLOFT) 50 MG Tab Take 1 tablet by mouth every day.     • hydrOXYzine HCl (ATARAX) 25 MG Tab Take 0.5-1 Tablets by mouth 2 times a day as needed for Anxiety (insomnia). 60  "tablet 1     No current facility-administered medications for this visit.     He  has no past medical history on file.    ROS   No chest pain, no shortness of breath, no abdominal pain  Positive ROS as per HPI.  All other systems reviewed and are negative.     Objective:     /70 (BP Location: Right arm, Patient Position: Sitting, BP Cuff Size: Adult)   Pulse 74   Temp 37.5 °C (99.5 °F) (Temporal)   Ht 1.651 m (5' 5\")   Wt 60.8 kg (134 lb)   SpO2 97%  Body mass index is 22.3 kg/m².     Physical Exam:  Constitutional: Alert, no distress.  Skin: Warm, dry, good turgor, no rashes in visible areas.  Eye: Equal, round and reactive, conjunctiva clear, lids normal.  ENMT: Mask in place  Neck: Trachea midline, no masses, no thyromegaly. No cervical or supraclavicular lymphadenopathy  Respiratory: Unlabored respiratory effort  Psych: Alert and oriented x3, normal affect and mood.        Assessment and Plan:   The following treatment plan was discussed    1. Subclinical hyperthyroidism  Unstable  Follow up with endocrinology  Check Thyroid US, 2017 US normal  - REFERRAL TO ENDOCRINOLOGY  - US-THYROID; Future    2. Insomnia, unspecified type  Unstable  Possibly due to thyroid dysfunction  Trial hydroxyzine  - REFERRAL TO ENDOCRINOLOGY  - hydrOXYzine HCl (ATARAX) 25 MG Tab; Take 0.5-1 Tablets by mouth 2 times a day as needed for Anxiety (insomnia).  Dispense: 60 tablet; Refill: 1    3. Mixed anxiety depressive disorder  Unstable  Possibly exacerbated by thyroid dysfunction and grief  Sertraline was started at too high of a dose likely causing shakiness that he is experiencing  Reduce dose to 50 mg daily, will titrate up if needed  Hydroxyzine as needed  - REFERRAL TO ENDOCRINOLOGY  - sertraline (ZOLOFT) 50 MG Tab; Take 1 tablet by mouth every day.  - hydrOXYzine HCl (ATARAX) 25 MG Tab; Take 0.5-1 Tablets by mouth 2 times a day as needed for Anxiety (insomnia).  Dispense: 60 tablet; Refill: 1    4. Cold intolerance  - " REFERRAL TO ENDOCRINOLOGY      Followup: Return in about 4 weeks (around 7/16/2021) for Depression/Anxiety.    I have placed the below orders and discussed them with an approved delegating provider. The MA is performing the below orders under the direction of Dr. Reynoso

## 2021-06-18 NOTE — ASSESSMENT & PLAN NOTE
Chronic. Waking up in the night and unable to fall asleep. Taking melatonin on his days off as it causes some daytime grogginess. Has not taking anything else for this OTC or RX.

## 2021-06-18 NOTE — ASSESSMENT & PLAN NOTE
Diagnosed on labs during hospitalization in 2018 for pneumonia, also had low TSH in 2017. Repeat labs done a few weeks ago show continued low TSH. Does report hot and cold flashes. Worsening anxiety and depression. Insomnia.     Does have some male patterned hair loss, hasn't noticed abnormal thinning.     Does have some constipation. No diarrhea. Does get excessively dry skin.     Has thyroid US in 2017 which was normal, homogenous, no nodules. Did have some reactive lymph nodes.     He has been referred to Endocrinology for further evaluation.

## 2021-06-18 NOTE — ASSESSMENT & PLAN NOTE
Chronic. Worsening. Has been on sertraline 50 mg twice daily for 4-5 years. Was improved initially. But has been worse over the past year or so.     Has had a lot of family stress, 3 deaths in the family due to COVID over the past 6 months.     Has never seen therapy or psychiatry in the past. Has been keeping himself busy and walking. Good support with wife.     Sertraline 50 mg BID was was refilled about 3 weeks ago at new patient appointment, however today it was revealed that he had been out of his medication since 2018.     Depression Screen (PHQ-2/PHQ-9) 5/28/2021   PHQ-2 Total Score -   PHQ-2 Total Score 2   PHQ-9 Total Score 8       Interpretation of PHQ-9 Total Score   Score Severity     5-9 Mild Depression

## 2021-06-25 ENCOUNTER — APPOINTMENT (OUTPATIENT)
Dept: MEDICAL GROUP | Facility: MEDICAL CENTER | Age: 31
End: 2021-06-25
Payer: COMMERCIAL

## 2021-07-15 ENCOUNTER — HOSPITAL ENCOUNTER (OUTPATIENT)
Dept: RADIOLOGY | Facility: MEDICAL CENTER | Age: 31
End: 2021-07-15
Attending: NURSE PRACTITIONER
Payer: COMMERCIAL

## 2021-07-15 DIAGNOSIS — E05.90 SUBCLINICAL HYPERTHYROIDISM: ICD-10-CM

## 2021-07-15 PROCEDURE — 76536 US EXAM OF HEAD AND NECK: CPT

## 2021-07-16 ENCOUNTER — TELEPHONE (OUTPATIENT)
Dept: MEDICAL GROUP | Facility: MEDICAL CENTER | Age: 31
End: 2021-07-16

## 2021-07-16 ENCOUNTER — APPOINTMENT (OUTPATIENT)
Dept: MEDICAL GROUP | Facility: MEDICAL CENTER | Age: 31
End: 2021-07-16
Payer: COMMERCIAL

## 2021-07-16 NOTE — TELEPHONE ENCOUNTER
Please notify patient that his ultrasound showed some changes in thyroid tissue composition which we see in thyroid disease, but he had no concerning enlargement or nodules which is good news.     DERRELL Jones.

## 2021-07-16 NOTE — TELEPHONE ENCOUNTER
1. Caller Name: Gabriel Lemos  Call Back Number: [phone]     Pt requesting ultrasound results. Please advise.

## 2021-07-21 ENCOUNTER — OFFICE VISIT (OUTPATIENT)
Dept: ENDOCRINOLOGY | Facility: MEDICAL CENTER | Age: 31
End: 2021-07-21
Attending: NURSE PRACTITIONER
Payer: COMMERCIAL

## 2021-07-21 VITALS
RESPIRATION RATE: 16 BRPM | DIASTOLIC BLOOD PRESSURE: 78 MMHG | OXYGEN SATURATION: 99 % | BODY MASS INDEX: 23.47 KG/M2 | HEART RATE: 71 BPM | SYSTOLIC BLOOD PRESSURE: 128 MMHG | WEIGHT: 140.9 LBS | HEIGHT: 65 IN

## 2021-07-21 DIAGNOSIS — Z79.899 HIGH RISK MEDICATION USE: ICD-10-CM

## 2021-07-21 DIAGNOSIS — E05.90 SUBCLINICAL HYPERTHYROIDISM: ICD-10-CM

## 2021-07-21 DIAGNOSIS — R00.2 PALPITATIONS: ICD-10-CM

## 2021-07-21 PROCEDURE — 99214 OFFICE O/P EST MOD 30 MIN: CPT | Performed by: NURSE PRACTITIONER

## 2021-07-21 PROCEDURE — 99211 OFF/OP EST MAY X REQ PHY/QHP: CPT | Performed by: NURSE PRACTITIONER

## 2021-07-21 RX ORDER — METOPROLOL SUCCINATE 25 MG/1
12.5 TABLET, EXTENDED RELEASE ORAL DAILY
Qty: 30 TABLET | Refills: 2 | Status: SHIPPED | OUTPATIENT
Start: 2021-07-21 | End: 2021-08-06 | Stop reason: SDUPTHER

## 2021-07-21 RX ORDER — METHIMAZOLE 5 MG/1
5 TABLET ORAL 3 TIMES DAILY
Qty: 30 TABLET | Refills: 5 | Status: SHIPPED | OUTPATIENT
Start: 2021-07-21 | End: 2021-08-06 | Stop reason: SDUPTHER

## 2021-07-21 ASSESSMENT — FIBROSIS 4 INDEX: FIB4 SCORE: 0.83

## 2021-07-21 NOTE — PROGRESS NOTES
Chief Complaint: Consult requested by LIZBETH Jones for evaluation of Hyperthyroidism    HPI:     Gabriel Lemos is a 30 y.o. male with history of subclinical hyperthyroidism diagnosed a couple years ago and is here for initial evaluation.  Patient reports the following symptoms: insomnia, losing weight, losing hair and is hot and cold, tremors.,constipation, diarrhea, excessive sweat. which has been present for a couple years.  Patient denies dysphagia and shortness of breath.  Denies lumps or enlargement in the neck. Denies anterior neck pain.     History of low TSH as far back as 2014:     Ref. Range 3/25/2014 08:28 9/22/2018 17:10   TSH Latest Ref Range: 0.380 - 5.330 uIU/mL 0.450 0.340 (L)   Thyroxine -T4 Latest Ref Range: 4.0 - 12.0 ug/dL 6.8    Free T-4 Latest Ref Range: 0.53 - 1.43 ng/dL 0.98           Ref. Range 6/4/2021 13:31   TSH Latest Ref Range: 0.380 - 5.330 uIU/mL 0.260 (L)   Free T-4 Latest Ref Range: 0.93 - 1.70 ng/dL 1.25   T3 Latest Ref Range: 60.0 - 181.0 ng/dL 82.4     Thyroid antibodies are negative:     Ref. Range 6/4/2021 13:31   Microsomal -Tpo- Abs Latest Ref Range: 0.0 - 9.0 IU/mL <0.3   Anti-Thyroglobulin Latest Ref Range: 0.0 - 4.0 IU/mL <0.9       Patient denies a family history of any kind of thyroid disease.  Patient denies taking thyroid hormone or biotin. any recent IV contrast exposure.   Patient denies any recent URI or having neck pain.    Ultrasound of the neck completed 07/15/2021: FINDINGS: The thyroid gland is heterogeneous. Vascularity is normal. The right lobe of the thyroid gland measures 1.33 cm x 6.08 cm x 2.46 cm. The contour and echogenicity are normal. No focal mass lesions are identified. The left lobe of the thyroid gland measures 1.03 cm x 6.06 cm x 2.35 cm. The contour and echogenicity are normal. No focal mass lesions are identified. The isthmus measures 0.28 cm. IMPRESSION: No thyroid gland nodule or mass identified.    History of taking GH  when he was a child for 1-2 years when he was 8 or 9 and lived in [place].  This was due to small stature.  Patient reports he did not notice a difference in his growth pattern and his parents did not report any difference as well.    Patient's medications, allergies, and social histories were reviewed and updated as appropriate.    History of vitamin D.  Not currently taking any vitamin D supplement patient.     Ref. Range 6/4/2021 13:31   25-Hydroxy   Vitamin D 25 Latest Ref Range: 30 - 80 ng/mL 27 (L)       ROS:     CONS:     No fever, no chills, no weight loss, no fatigue   EYES:      No diplopia, no blurry vision, no redness of eyes, no swelling of eyelids   ENT:    No hearing loss, No ear pain, No sore throat, no dysphagia, no neck swelling   CV:     No chest pain, no palpitations, no claudication, no orthopnea, no PND   PULM:    No SOB, no cough, no hemoptysis, no wheezing    GI:   No nausea, no vomiting, no diarrhea, no constipation, no bloody stools   :  Passing urine well, no dysuria, no hematuria   ENDO:   No polyuria, no polydipsia, no heat intolerance, no cold intolerance   NEURO: No headaches, no dizziness, no convulsions, no tremors   MUSC:  No joint swellings, no arthralgias, no myalgias, no weakness   SKIN:   No rash, no ulcers, no dry skin   PSYCH:   No depression, no anxiety, no difficulty sleeping       Past Medical History:  Patient Active Problem List    Diagnosis Date Noted   • Cold intolerance 05/28/2021   • Pneumonia 09/22/2018   • Seasonal allergies 09/20/2017   • Subclinical hyperthyroidism 07/13/2017   • Abnormal serum thyroid stimulating hormone (TSH) level 05/19/2017   • Insomnia 05/12/2017   • Low back pain 05/12/2017   • Scoliosis 04/26/2017   • Family history of diabetes mellitus 03/27/2017   • Mixed anxiety depressive disorder 03/27/2017       Past Surgical History:  History reviewed. No pertinent surgical history.     Allergies:  Patient has no known allergies.     Current  Medications:    Current Outpatient Medications:   •  hydrOXYzine HCl (ATARAX) 25 MG Tab, TAKE 0.5-1 TABLETS BY MOUTH 2 TIMES A DAY AS NEEDED FOR ANXIETY (INSOMNIA)., Disp: 60 tablet, Rfl: 1  •  sertraline (ZOLOFT) 50 MG Tab, Take 1 tablet by mouth every day., Disp: , Rfl:     Social History:  Social History     Socioeconomic History   • Marital status:      Spouse name: Not on file   • Number of children: Not on file   • Years of education: Not on file   • Highest education level: Associate degree: occupational, technical, or vocational program   Occupational History   • Not on file   Tobacco Use   • Smoking status: Former Smoker     Packs/day: 0.50     Years: 3.00     Pack years: 1.50     Types: Cigarettes   • Smokeless tobacco: Never Used   Vaping Use   • Vaping Use: Never used   Substance and Sexual Activity   • Alcohol use: Yes     Comment: rare   • Drug use: No   • Sexual activity: Not on file   Other Topics Concern   • Not on file   Social History Narrative   • Not on file     Social Determinants of Health     Financial Resource Strain:    • Difficulty of Paying Living Expenses:    Food Insecurity: Food Insecurity Present   • Worried About Running Out of Food in the Last Year: Sometimes true   • Ran Out of Food in the Last Year: Sometimes true   Transportation Needs: No Transportation Needs   • Lack of Transportation (Medical): No   • Lack of Transportation (Non-Medical): No   Physical Activity: Sufficiently Active   • Days of Exercise per Week: 5 days   • Minutes of Exercise per Session: 60 min   Stress: Stress Concern Present   • Feeling of Stress : To some extent   Social Connections: Moderately Integrated   • Frequency of Communication with Friends and Family: Once a week   • Frequency of Social Gatherings with Friends and Family: Never   • Attends [employer] Services: More than 4 times per year   • Active Member of Clubs or Organizations: Yes   • Attends Club or Organization Meetings: Never   •  "Marital Status:    Intimate Partner Violence:    • Fear of Current or Ex-Partner:    • Emotionally Abused:    • Physically Abused:    • Sexually Abused:         Family History:   Family History   Problem Relation Age of Onset   • No Known Problems Mother    • Diabetes Maternal Grandfather    • Stroke Neg Hx    • Heart Disease Neg Hx        PHYSICAL EXAM:   Vital signs: /78 (BP Location: Left arm, Patient Position: Sitting, BP Cuff Size: Adult)   Pulse 71   Resp 16   Ht 1.651 m (5' 5\")   Wt 63.9 kg (140 lb 14.4 oz)   SpO2 99%   BMI 23.45 kg/m²   GENERAL: Well-developed, well-nourished  in no apparent distress.   EYE: No ocular and eyelid asymmetry, Anicteric sclerae,  PERRL, No exophthalmos or lidlag  HENT: Hearing grossly intact, Normocephalic, atraumatic. Pink, moist mucous membranes, No exudate  NECK: Supple. Trachea midline.   CARDIOVASCULAR: Regular rate and rhythm. No murmurs, rubs, or gallops.   LUNGS: Clear to auscultation bilaterally   ABDOMEN: Soft, nontender with positive bowel sounds.   EXTREMITIES: No clubbing, cyanosis, or edema.   NEUROLOGICAL: Cranial nerves II-XII are grossly intact   Symmetric reflexes at the patella no proximal muscle weakness, No visible tremor with both outstretched hands  LYMPH: No cervical, supraclavicular,  adenopathy palpated.   SKIN: No rashes, lesions. Turgor is normal.    ASSESSMENT/PLAN:   1. Subclinical hyperthyroidism  Unstable.  TSH level has been consistently suppressed as far back as 2014.  Although patient's symptomology has varied throughout that time.  Ultrasound of the neck is negative, vascularity is within normal limits.  Size of thyroid gland is within normal limits.    Antibody testing is negative.  Thyroid uptake and scan is not indicated at this time.  Recommend starting low-dose methimazole 2.5 mg daily.  Recommend metoprolol 25 mg half a pill once a day.    Future lab orders:  - T3 FREE; Future  - FREE THYROXINE; Future  - TSH; " Future      2. Palpitations  Unstable.  As per plan #1.    Repeat lab work 1 week prior to next appointment.  Next appointment in 6 weeks.    3.  High-risk medication  Medication and side effects reviewed in detail.  Routine monitoring of CBC while on methimazole is indicated    Thank you kindly for allowing me to participate in the thyroid care plan for this patient.    Jessie Lala, APRN  07/21/21    CC:   LIZBETH Jones

## 2021-07-23 ENCOUNTER — APPOINTMENT (OUTPATIENT)
Dept: PHYSICAL MEDICINE AND REHAB | Facility: MEDICAL CENTER | Age: 31
End: 2021-07-23
Payer: COMMERCIAL

## 2021-07-23 ENCOUNTER — OFFICE VISIT (OUTPATIENT)
Dept: MEDICAL GROUP | Facility: MEDICAL CENTER | Age: 31
End: 2021-07-23

## 2021-07-23 VITALS
SYSTOLIC BLOOD PRESSURE: 120 MMHG | WEIGHT: 137.6 LBS | HEART RATE: 65 BPM | TEMPERATURE: 97.1 F | BODY MASS INDEX: 22.92 KG/M2 | OXYGEN SATURATION: 96 % | HEIGHT: 65 IN | DIASTOLIC BLOOD PRESSURE: 70 MMHG

## 2021-07-23 DIAGNOSIS — E05.90 SUBCLINICAL HYPERTHYROIDISM: ICD-10-CM

## 2021-07-23 DIAGNOSIS — F41.8 MIXED ANXIETY DEPRESSIVE DISORDER: ICD-10-CM

## 2021-07-23 PROCEDURE — 99214 OFFICE O/P EST MOD 30 MIN: CPT | Performed by: NURSE PRACTITIONER

## 2021-07-23 RX ORDER — SERTRALINE HYDROCHLORIDE 100 MG/1
100 TABLET, FILM COATED ORAL DAILY
Qty: 90 TABLET | Refills: 3 | Status: SHIPPED | OUTPATIENT
Start: 2021-07-23 | End: 2021-12-17

## 2021-07-23 ASSESSMENT — FIBROSIS 4 INDEX: FIB4 SCORE: 0.83

## 2021-07-23 NOTE — ASSESSMENT & PLAN NOTE
Diagnosed on labs during hospitalization in 2018 for pneumonia, also had low TSH in 2017. Repeat labs done a few weeks ago show continued low TSH. Does report hot and cold flashes. Worsening anxiety and depression. Insomnia.     Does have some male patterned hair loss, hasn't noticed abnormal thinning.     Does have some constipation. No diarrhea. Does get excessively dry skin.     Has thyroid US in 2017 which was normal, homogenous, no nodules. Did have some reactive lymph nodes.     Seen by endocrinology 2 days ago, started on methimazole 5 mg TID and metoprolol 12.5 mg daily.     He reports no change in symptoms. Continue to have significant anxiety which is worse with work stress. His anxiety has been chronic, currently on zolft for this.

## 2021-07-23 NOTE — PROGRESS NOTES
Subjective:   Gabriel Lemos is a 30 y.o. male here today for follow up on thyroid and anxiety    Subclinical hyperthyroidism  Diagnosed on labs during hospitalization in 2018 for pneumonia, also had low TSH in 2017. Repeat labs done a few weeks ago show continued low TSH. Does report hot and cold flashes. Worsening anxiety and depression. Insomnia.     Does have some male patterned hair loss, hasn't noticed abnormal thinning.     Does have some constipation. No diarrhea. Does get excessively dry skin.     Has thyroid US in 2017 which was normal, homogenous, no nodules. Did have some reactive lymph nodes.     Seen by endocrinology 2 days ago, started on methimazole 5 mg TID and metoprolol 12.5 mg daily.     He reports no change in symptoms. Continue to have significant anxiety which is worse with work stress. His anxiety has been chronic, currently on zolft for this.     Mixed anxiety depressive disorder  Chronic. Worsening. Has been on sertraline 50 mg twice daily for 4-5 years. Was improved initially. But has been worse over the past year or so.     Has had a lot of family stress, 3 deaths in the family due to COVID over the past 6 months.     Also recently diagnosed with subclinical hyperthyroidism which is likely exacerbating his anxiety.    Has never seen therapy or psychiatry in the past. Has been keeping himself busy and walking. Good support with wife.     He had been out of his medication since 2018, this was restarted at 50 mg daily about 4 weeks ago, no significant improvement.     Depression Screen (PHQ-2/PHQ-9) 5/28/2021   PHQ-2 Total Score -   PHQ-2 Total Score 2   PHQ-9 Total Score 8       Interpretation of PHQ-9 Total Score   Score Severity     5-9 Mild Depression              Current medicines (including changes today)  Current Outpatient Medications   Medication Sig Dispense Refill   • sertraline (ZOLOFT) 100 MG Tab Take 1 tablet by mouth every day. 90 tablet 3   • hydrOXYzine HCl (ATARAX) 25  "MG Tab TAKE 0.5-1 TABLETS BY MOUTH 2 TIMES A DAY AS NEEDED FOR ANXIETY (INSOMNIA). 60 tablet 1   • methimazole (TAPAZOLE) 5 MG Tab Take 1 tablet by mouth 3 times a day. 30 tablet 5   • metoprolol SR (TOPROL XL) 25 MG TABLET SR 24 HR Take 0.5 Tablets by mouth every day. 30 tablet 2     No current facility-administered medications for this visit.     He  has no past medical history on file.    ROS   No chest pain, no shortness of breath, no abdominal pain  Positive ROS as per HPI.  All other systems reviewed and are negative.     Objective:     /70 (BP Location: Right arm, Patient Position: Sitting, BP Cuff Size: Adult)   Pulse 65   Temp 36.2 °C (97.1 °F) (Temporal)   Ht 1.651 m (5' 5\")   Wt 62.4 kg (137 lb 9.6 oz)   SpO2 96%  Body mass index is 22.9 kg/m².     Physical Exam:  Constitutional: Alert, no distress.  Skin: Warm, dry, good turgor, no rashes in visible areas.  Eye: Equal, round and reactive, conjunctiva clear, lids normal.  ENMT: mask in place  Respiratory: Unlabored respiratory effort  Psych: Alert and oriented x3, normal affect and mood.        Assessment and Plan:   The following treatment plan was discussed    1. Mixed anxiety depressive disorder  Unstable  Increase sertraline to 100 mg daily  Continue metoprolol 12.5 mg daily for hypothyroid  - sertraline (ZOLOFT) 100 MG Tab; Take 1 tablet by mouth every day.  Dispense: 90 tablet; Refill: 3    2. Subclinical hyperthyroidism  Unstable  Continue methimazole and metoprolol  Plan to repeat labs in 5 weeks per endocrinology  Follow-up with endocrinology in 6 weeks      Followup: Return in about 3 months (around 10/23/2021).    I have placed the below orders and discussed them with an approved delegating provider. The MA is performing the below orders under the direction of Dr. Reynoso           "

## 2021-07-23 NOTE — ASSESSMENT & PLAN NOTE
Chronic. Worsening. Has been on sertraline 50 mg twice daily for 4-5 years. Was improved initially. But has been worse over the past year or so.     Has had a lot of family stress, 3 deaths in the family due to COVID over the past 6 months.     Also recently diagnosed with subclinical hyperthyroidism which is likely exacerbating his anxiety.    Has never seen therapy or psychiatry in the past. Has been keeping himself busy and walking. Good support with wife.     He had been out of his medication since 2018, this was restarted at 50 mg daily about 4 weeks ago, no significant improvement.     Depression Screen (PHQ-2/PHQ-9) 5/28/2021   PHQ-2 Total Score -   PHQ-2 Total Score 2   PHQ-9 Total Score 8       Interpretation of PHQ-9 Total Score   Score Severity     5-9 Mild Depression

## 2021-08-06 DIAGNOSIS — E05.90 SUBCLINICAL HYPERTHYROIDISM: ICD-10-CM

## 2021-08-06 RX ORDER — METHIMAZOLE 5 MG/1
10 TABLET ORAL 2 TIMES DAILY
Qty: 60 TABLET | Refills: 5 | Status: SHIPPED | OUTPATIENT
Start: 2021-08-06 | End: 2021-08-23

## 2021-08-06 RX ORDER — METOPROLOL SUCCINATE 25 MG/1
25 TABLET, EXTENDED RELEASE ORAL 2 TIMES DAILY
Qty: 60 TABLET | Refills: 5 | Status: SHIPPED | OUTPATIENT
Start: 2021-08-06 | End: 2021-09-07

## 2021-08-22 DIAGNOSIS — E05.90 SUBCLINICAL HYPERTHYROIDISM: ICD-10-CM

## 2021-08-23 ENCOUNTER — PATIENT MESSAGE (OUTPATIENT)
Dept: ENDOCRINOLOGY | Facility: MEDICAL CENTER | Age: 31
End: 2021-08-23

## 2021-08-23 RX ORDER — METHIMAZOLE 5 MG/1
10 TABLET ORAL 2 TIMES DAILY
Qty: 360 TABLET | Refills: 1 | Status: SHIPPED | OUTPATIENT
Start: 2021-08-23 | End: 2021-09-24

## 2021-08-23 NOTE — PROGRESS NOTES
Reina Payne RN showed me an urgent message from the patient unfortunately Jessie Lala our nurse practitioner was not available because of a family emergency.  Patient reports itching while on methimazole I recommend that he stop methimazole for now as he is most likely reacting to the medication even though it is a low-dose    Recommend that he follow-up as planned and get lab work before his appointment

## 2021-08-23 NOTE — TELEPHONE ENCOUNTER
Received request via: Pharmacy    Was the patient seen in the last year in this department? Yes    Does the patient have an active prescription (recently filled or refills available) for medication(s) requested? No     REQUESTED MEDICATION:   Requested Prescriptions     Pending Prescriptions Disp Refills   • methimazole (TAPAZOLE) 5 MG Tab [Pharmacy Med Name: METHIMAZOLE 5 MG TABLET] 360 Tablet 1     Sig: TAKE 2 TABLETS BY MOUTH 2 TIMES A DAY.

## 2021-09-06 DIAGNOSIS — E05.90 SUBCLINICAL HYPERTHYROIDISM: ICD-10-CM

## 2021-09-07 RX ORDER — METOPROLOL SUCCINATE 25 MG/1
TABLET, EXTENDED RELEASE ORAL
Qty: 60 TABLET | Refills: 5 | Status: SHIPPED | OUTPATIENT
Start: 2021-09-07 | End: 2021-09-13

## 2021-09-07 NOTE — TELEPHONE ENCOUNTER
Received request via: Pharmacy    Was the patient seen in the last year in this department? Yes    Does the patient have an active prescription (recently filled or refills available) for medication(s) requested? No     metoprolol SR (TOPROL XL) 25 MG TABLET SR 24 HR    Sig: TAKE 1 TABLET BY MOUTH TWICE A DAY

## 2021-09-11 DIAGNOSIS — E05.90 SUBCLINICAL HYPERTHYROIDISM: ICD-10-CM

## 2021-09-13 RX ORDER — METOPROLOL SUCCINATE 25 MG/1
TABLET, EXTENDED RELEASE ORAL
Qty: 30 TABLET | Refills: 2 | Status: SHIPPED | OUTPATIENT
Start: 2021-09-13 | End: 2021-09-24

## 2021-09-13 NOTE — TELEPHONE ENCOUNTER
Received request via: Pharmacy    Was the patient seen in the last year in this department? Yes    Does the patient have an active prescription (recently filled or refills available) for medication(s) requested? No       metoprolol SR (TOPROL XL) 25 MG TABLET SR 24 HR    Sig: TAKE 1/2 TABLET BY MOUTH EVERY DAY

## 2021-09-17 ENCOUNTER — HOSPITAL ENCOUNTER (OUTPATIENT)
Dept: LAB | Facility: MEDICAL CENTER | Age: 31
End: 2021-09-17
Attending: NURSE PRACTITIONER
Payer: COMMERCIAL

## 2021-09-17 DIAGNOSIS — Z79.899 HIGH RISK MEDICATION USE: ICD-10-CM

## 2021-09-17 DIAGNOSIS — E05.90 SUBCLINICAL HYPERTHYROIDISM: ICD-10-CM

## 2021-09-17 LAB
ERYTHROCYTE [DISTWIDTH] IN BLOOD BY AUTOMATED COUNT: 41.1 FL (ref 35.9–50)
HCT VFR BLD AUTO: 42.5 % (ref 42–52)
HGB BLD-MCNC: 14.5 G/DL (ref 14–18)
MCH RBC QN AUTO: 30.8 PG (ref 27–33)
MCHC RBC AUTO-ENTMCNC: 34.1 G/DL (ref 33.7–35.3)
MCV RBC AUTO: 90.2 FL (ref 81.4–97.8)
PLATELET # BLD AUTO: 185 K/UL (ref 164–446)
PMV BLD AUTO: 10.8 FL (ref 9–12.9)
RBC # BLD AUTO: 4.71 M/UL (ref 4.7–6.1)
T3FREE SERPL-MCNC: 2.9 PG/ML (ref 2–4.4)
T4 FREE SERPL-MCNC: 1.02 NG/DL (ref 0.93–1.7)
TSH SERPL DL<=0.005 MIU/L-ACNC: 0.31 UIU/ML (ref 0.38–5.33)
WBC # BLD AUTO: 5.4 K/UL (ref 4.8–10.8)

## 2021-09-17 PROCEDURE — 36415 COLL VENOUS BLD VENIPUNCTURE: CPT

## 2021-09-17 PROCEDURE — 84481 FREE ASSAY (FT-3): CPT

## 2021-09-17 PROCEDURE — 84439 ASSAY OF FREE THYROXINE: CPT

## 2021-09-17 PROCEDURE — 85027 COMPLETE CBC AUTOMATED: CPT

## 2021-09-17 PROCEDURE — 84443 ASSAY THYROID STIM HORMONE: CPT

## 2021-09-24 ENCOUNTER — OFFICE VISIT (OUTPATIENT)
Dept: ENDOCRINOLOGY | Facility: MEDICAL CENTER | Age: 31
End: 2021-09-24
Attending: NURSE PRACTITIONER
Payer: COMMERCIAL

## 2021-09-24 VITALS
SYSTOLIC BLOOD PRESSURE: 122 MMHG | OXYGEN SATURATION: 98 % | WEIGHT: 141.2 LBS | DIASTOLIC BLOOD PRESSURE: 70 MMHG | HEIGHT: 65 IN | HEART RATE: 80 BPM | BODY MASS INDEX: 23.53 KG/M2

## 2021-09-24 DIAGNOSIS — E05.90 SUBCLINICAL HYPERTHYROIDISM: ICD-10-CM

## 2021-09-24 DIAGNOSIS — R79.89 ABNORMAL SERUM THYROID STIMULATING HORMONE (TSH) LEVEL: ICD-10-CM

## 2021-09-24 DIAGNOSIS — Z79.899 HIGH RISK MEDICATION USE: ICD-10-CM

## 2021-09-24 DIAGNOSIS — R00.2 PALPITATIONS: ICD-10-CM

## 2021-09-24 PROCEDURE — 99214 OFFICE O/P EST MOD 30 MIN: CPT | Performed by: NURSE PRACTITIONER

## 2021-09-24 PROCEDURE — 99211 OFF/OP EST MAY X REQ PHY/QHP: CPT | Performed by: NURSE PRACTITIONER

## 2021-09-24 RX ORDER — PROPYLTHIOURACIL 50 MG/1
50 TABLET ORAL 3 TIMES DAILY
Qty: 90 TABLET | Refills: 0 | Status: SHIPPED | OUTPATIENT
Start: 2021-09-24 | End: 2021-10-18

## 2021-09-24 RX ORDER — METOPROLOL SUCCINATE 25 MG/1
12.5 TABLET, EXTENDED RELEASE ORAL DAILY
Qty: 30 TABLET | Refills: 2 | Status: SHIPPED | OUTPATIENT
Start: 2021-09-24 | End: 2022-06-28

## 2021-09-24 ASSESSMENT — FIBROSIS 4 INDEX: FIB4 SCORE: 1.19

## 2021-09-24 NOTE — PROGRESS NOTES
Chief Complaint:  Follow up evaluation for Hyperthyroidism    HPI:     Gabriel Lemos is a 30 y.o. male  for continued evaluation & treatment of the followin. Subclinical hyperthyroidism  Diagnosed a 2 years ago but not previously treated.  Patient reports the following symptoms: insomnia, losing weight, losing hair and is hot and cold, tremors.,constipation, diarrhea, excessive sweat. which has been present for a couple years.  Patient denies dysphagia and shortness of breath.  Denies lumps or enlargement in the neck. Denies anterior neck pain.     Patient started methimazole therapy after initial consultation in July. Patient tolerated Methimazole 2.5mg daily. Dosage was increased to 5mg secondary to symptoms not improving. Patient then increased his dosage again to 7.5mg daily and developed a rash and blisters. Dr. Amaya told the patient to discontinue this therapy immediately and wait for this follow up appointment.     Patient reports today that his symptoms continue. He hasn't had any relief. Patient is taking metoprolol inconsistently at this time.        Ref. Range 2021 13:31   TSH Latest Ref Range: 0.380 - 5.330 uIU/mL 0.260 (L)   Free T-4 Latest Ref Range: 0.93 - 1.70 ng/dL 1.25   T3 Latest Ref Range: 60.0 - 181.0 ng/dL 82.4     Thyroid antibodies are negative:     Ref. Range 2021 13:31   Microsomal -Tpo- Abs Latest Ref Range: 0.0 - 9.0 IU/mL <0.3   Anti-Thyroglobulin Latest Ref Range: 0.0 - 4.0 IU/mL <0.9       Recent Thyroid Panel:      Ref. Range 2021 07:32   TSH Latest Ref Range: 0.380 - 5.330 uIU/mL 0.310 (L)   Free T-4 Latest Ref Range: 0.93 - 1.70 ng/dL 1.02   T3,Free Latest Ref Range: 2.00 - 4.40 pg/mL 2.90       Patient denies a family history of any kind of thyroid disease.  Patient denies taking thyroid hormone or biotin. any recent IV contrast exposure.   Patient denies any recent URI or having neck pain.    Ultrasound of the neck completed 07/15/2021: FINDINGS: The  thyroid gland is heterogeneous. Vascularity is normal. The right lobe of the thyroid gland measures 1.33 cm x 6.08 cm x 2.46 cm. The contour and echogenicity are normal. No focal mass lesions are identified. The left lobe of the thyroid gland measures 1.03 cm x 6.06 cm x 2.35 cm. The contour and echogenicity are normal. No focal mass lesions are identified. The isthmus measures 0.28 cm. IMPRESSION: No thyroid gland nodule or mass identified.    History of taking GH when he was a child for 1-2 years when he was 8 or 9 and lived in [place].  This was due to small stature.  Patient reports he did not notice a difference in his growth pattern and his parents did not report any difference as well.    Patient's medications, allergies, and social histories were reviewed and updated as appropriate.    History of vitamin D.  Not currently taking any vitamin D supplement patient.     Ref. Range 6/4/2021 13:31   25-Hydroxy   Vitamin D 25 Latest Ref Range: 30 - 80 ng/mL 27 (L)       ROS:     CONS:     No fever, no chills, no weight loss, no fatigue   EYES:      No diplopia, no blurry vision, no redness of eyes, no swelling of eyelids   ENT:    No hearing loss, No ear pain, No sore throat, no dysphagia, no neck swelling   CV:     No chest pain, no palpitations, no claudication, no orthopnea, no PND   PULM:    No SOB, no cough, no hemoptysis, no wheezing    GI:   No nausea, no vomiting, no diarrhea, no constipation, no bloody stools   :  Passing urine well, no dysuria, no hematuria   ENDO:   No polyuria, no polydipsia, no heat intolerance, no cold intolerance   NEURO: No headaches, no dizziness, no convulsions, no tremors   MUSC:  No joint swellings, no arthralgias, no myalgias, no weakness   SKIN:   No rash, no ulcers, no dry skin   PSYCH:   No depression, no anxiety, no difficulty sleeping       Past Medical History:  Patient Active Problem List    Diagnosis Date Noted   • Cold intolerance 05/28/2021   • Pneumonia 09/22/2018    • Seasonal allergies 09/20/2017   • Subclinical hyperthyroidism 07/13/2017   • Abnormal serum thyroid stimulating hormone (TSH) level 05/19/2017   • Insomnia 05/12/2017   • Low back pain 05/12/2017   • Scoliosis 04/26/2017   • Family history of diabetes mellitus 03/27/2017   • Mixed anxiety depressive disorder 03/27/2017       Past Surgical History:  No past surgical history on file.     Allergies:  Patient has no known allergies.     Current Medications:    Current Outpatient Medications:   •  sertraline (ZOLOFT) 50 MG Tab, , Disp: , Rfl:   •  metoprolol SR (TOPROL XL) 25 MG TABLET SR 24 HR, TAKE 1/2 TABLET BY MOUTH EVERY DAY, Disp: 30 Tablet, Rfl: 2  •  methimazole (TAPAZOLE) 5 MG Tab, TAKE 2 TABLETS BY MOUTH 2 TIMES A DAY., Disp: 360 Tablet, Rfl: 1  •  hydrOXYzine HCl (ATARAX) 25 MG Tab, TAKE 0.5-1 TABLETS BY MOUTH 2 TIMES A DAY AS NEEDED FOR ANXIETY (INSOMNIA)., Disp: 180 tablet, Rfl: 1  •  sertraline (ZOLOFT) 100 MG Tab, Take 1 tablet by mouth every day., Disp: 90 tablet, Rfl: 3    Social History:  Social History     Socioeconomic History   • Marital status:      Spouse name: Not on file   • Number of children: Not on file   • Years of education: Not on file   • Highest education level: Associate degree: occupational, technical, or vocational program   Occupational History   • Not on file   Tobacco Use   • Smoking status: Former Smoker     Packs/day: 0.50     Years: 3.00     Pack years: 1.50     Types: Cigarettes   • Smokeless tobacco: Never Used   Vaping Use   • Vaping Use: Never used   Substance and Sexual Activity   • Alcohol use: Yes     Comment: rare   • Drug use: No   • Sexual activity: Not on file   Other Topics Concern   • Not on file   Social History Narrative   • Not on file     Social Determinants of Health     Financial Resource Strain:    • Difficulty of Paying Living Expenses:    Food Insecurity: Food Insecurity Present   • Worried About Running Out of Food in the Last Year: Sometimes  "true   • Ran Out of Food in the Last Year: Sometimes true   Transportation Needs: No Transportation Needs   • Lack of Transportation (Medical): No   • Lack of Transportation (Non-Medical): No   Physical Activity: Sufficiently Active   • Days of Exercise per Week: 5 days   • Minutes of Exercise per Session: 60 min   Stress: Stress Concern Present   • Feeling of Stress : To some extent   Social Connections: Moderately Integrated   • Frequency of Communication with Friends and Family: Once a week   • Frequency of Social Gatherings with Friends and Family: Never   • Attends Rastafarian Services: More than 4 times per year   • Active Member of Clubs or Organizations: Yes   • Attends Club or Organization Meetings: Never   • Marital Status:    Intimate Partner Violence:    • Fear of Current or Ex-Partner:    • Emotionally Abused:    • Physically Abused:    • Sexually Abused:         Family History:   Family History   Problem Relation Age of Onset   • No Known Problems Mother    • Diabetes Maternal Grandfather    • Stroke Neg Hx    • Heart Disease Neg Hx        PHYSICAL EXAM:   Vital signs: /70 (BP Location: Left arm, Patient Position: Sitting, BP Cuff Size: Adult)   Pulse 80   Ht 1.651 m (5' 5\")   Wt 64 kg (141 lb 3.2 oz)   SpO2 98%   BMI 23.50 kg/m²   GENERAL: Well-developed, well-nourished  in no apparent distress.   EYE: No ocular and eyelid asymmetry, Anicteric sclerae,  PERRL, No exophthalmos or lidlag  HENT: Hearing grossly intact, Normocephalic, atraumatic. Pink, moist mucous membranes, No exudate  NECK: Supple. Trachea midline.   CARDIOVASCULAR: Regular rate and rhythm. No murmurs, rubs, or gallops.   LUNGS: Clear to auscultation bilaterally   ABDOMEN: Soft, nontender with positive bowel sounds.   EXTREMITIES: No clubbing, cyanosis, or edema.   NEUROLOGICAL: Cranial nerves II-XII are grossly intact   Symmetric reflexes at the patella no proximal muscle weakness, No visible tremor with both " outstretched hands  LYMPH: No cervical, supraclavicular,  adenopathy palpated.   SKIN: No rashes, lesions. Turgor is normal.    ASSESSMENT/PLAN:   1. Subclinical hyperthyroidism  Unstable.  TSH level has been consistently suppressed as far back as 2014.  TSH increased slightly form June 2021. Although patient's symptomology has varied throughout that time.  Ultrasound of the neck is negative, vascularity is within normal limits.  Size of thyroid gland is within normal limits.    Recommend PTU 50mg daily. Increase to twice a day if symptoms persist.   Recommend metoprolol 25 mg half a pill once a day.    Future lab orders:  - T3 FREE; Future  - FREE THYROXINE; Future  - TSH; Future    Recommend MRI of brain secondary to patient ongoing headaches, sweating, agitation and failure of pharmaceutical therapy.     2. Palpitations  Unstable.  As per plan #1.    Repeat lab work 1 week prior to next appointment.  Next appointment in 6 weeks.    3.  High-risk medication  Medication and side effects reviewed in detail.    Repeat labs before next appointment.   Next appointment in 6 weeks.     Thank you kindly for allowing me to participate in the thyroid care plan for this patient.    Jessie Lala, APRN  09/24/2021    CC:   LIZBETH Jones

## 2021-10-16 DIAGNOSIS — E05.90 SUBCLINICAL HYPERTHYROIDISM: ICD-10-CM

## 2021-10-18 RX ORDER — PROPYLTHIOURACIL 50 MG/1
TABLET ORAL
Qty: 90 TABLET | Refills: 0 | Status: SHIPPED | OUTPATIENT
Start: 2021-10-18 | End: 2021-11-01

## 2021-10-18 NOTE — TELEPHONE ENCOUNTER
Received request via: Pharmacy    Was the patient seen in the last year in this department? Yes    Does the patient have an active prescription (recently filled or refills available) for medication(s) requested? No       REQUESTED MEDICATION:   Requested Prescriptions     Pending Prescriptions Disp Refills   • propylthiouracil (PTU) 50 MG Tab [Pharmacy Med Name: PROPYLTHIOURACIL 50 MG TABLET] 90 Tablet 0     Sig: TAKE 1 TABLET BY MOUTH THREE TIMES A DAY

## 2021-10-22 ENCOUNTER — HOSPITAL ENCOUNTER (OUTPATIENT)
Dept: RADIOLOGY | Facility: MEDICAL CENTER | Age: 31
End: 2021-10-22
Attending: NURSE PRACTITIONER
Payer: COMMERCIAL

## 2021-10-22 DIAGNOSIS — R79.89 ABNORMAL SERUM THYROID STIMULATING HORMONE (TSH) LEVEL: ICD-10-CM

## 2021-10-22 DIAGNOSIS — E05.90 SUBCLINICAL HYPERTHYROIDISM: ICD-10-CM

## 2021-10-22 PROCEDURE — 700117 HCHG RX CONTRAST REV CODE 255: Performed by: NURSE PRACTITIONER

## 2021-10-22 PROCEDURE — 70553 MRI BRAIN STEM W/O & W/DYE: CPT

## 2021-10-22 PROCEDURE — A9576 INJ PROHANCE MULTIPACK: HCPCS | Performed by: NURSE PRACTITIONER

## 2021-10-22 RX ADMIN — GADOTERIDOL 13 ML: 279.3 INJECTION, SOLUTION INTRAVENOUS at 08:59

## 2021-10-27 ENCOUNTER — TELEPHONE (OUTPATIENT)
Dept: ENDOCRINOLOGY | Facility: MEDICAL CENTER | Age: 31
End: 2021-10-27

## 2021-10-27 NOTE — TELEPHONE ENCOUNTER
VOICEMAIL  1. Caller Name: Gabriel Lemos                        Call Back Number: [phone] (home) [phone] (work)     2. Message: Patient called and left a message for us stating that he wanted to know the results from his MRI and if we could call him back with the results of the MRI.     Please let me know what you would like and I can call the patient.     Thank you.     3. Patient approves office to leave a detailed voicemail/MyChart message: yes

## 2021-11-01 DIAGNOSIS — E05.90 SUBCLINICAL HYPERTHYROIDISM: ICD-10-CM

## 2021-11-01 RX ORDER — PROPYLTHIOURACIL 50 MG/1
TABLET ORAL
Qty: 270 TABLET | Refills: 1 | Status: SHIPPED | OUTPATIENT
Start: 2021-11-01 | End: 2022-06-28

## 2021-11-03 ENCOUNTER — TELEPHONE (OUTPATIENT)
Dept: ENDOCRINOLOGY | Facility: MEDICAL CENTER | Age: 31
End: 2021-11-03

## 2021-11-03 NOTE — TELEPHONE ENCOUNTER
Pt called and he stated he had a MRI done 2 weeks ago and he still hasn't received any call back. He stated he did put a my chart message in but still hasnt received a call back.. please call [phone]

## 2021-11-04 ENCOUNTER — TELEPHONE (OUTPATIENT)
Dept: MEDICAL GROUP | Facility: MEDICAL CENTER | Age: 31
End: 2021-11-04

## 2021-11-04 DIAGNOSIS — R79.89 ABNORMAL SERUM THYROID STIMULATING HORMONE (TSH) LEVEL: ICD-10-CM

## 2021-11-04 DIAGNOSIS — E05.90 SUBCLINICAL HYPERTHYROIDISM: ICD-10-CM

## 2021-11-04 DIAGNOSIS — D35.2 PITUITARY MICROADENOMA (HCC): ICD-10-CM

## 2021-11-04 NOTE — TELEPHONE ENCOUNTER
Call placed to patient to discuss MRI results confirming pituitary microadenoma which is the likely cause of his low TSH/subclinical hyperthyroidism. Has not been contacted by endocrinology yet. Continues taking PTU and metoprolol as prescribed.     Urgent referral placed to neurosurgery to evaluation and treatment of this.     All questions and concerns addressed.     DERRELL Jones.

## 2021-11-05 ENCOUNTER — TELEPHONE (OUTPATIENT)
Dept: ENDOCRINOLOGY | Facility: MEDICAL CENTER | Age: 31
End: 2021-11-05

## 2021-11-05 DIAGNOSIS — D35.2 PITUITARY ADENOMA (HCC): ICD-10-CM

## 2021-11-05 DIAGNOSIS — D35.2 PITUITARY MICROADENOMA (HCC): ICD-10-CM

## 2021-11-05 NOTE — TELEPHONE ENCOUNTER
Called patient to discuss MRI results. This was also discussed with Dr. Amaya.   The patient isn't experiencing any blurred vision or partial visual field.   Patient has worsening migraine headaches.   Patient be actively treated for subclinical hyperthyroidism. Overall has less tremors and anxiousness.     Referral to Dr Dr. Hook to further evaluate optic nerve and visual acuity.

## 2021-11-13 ENCOUNTER — HOSPITAL ENCOUNTER (OUTPATIENT)
Dept: LAB | Facility: MEDICAL CENTER | Age: 31
End: 2021-11-13
Attending: NEUROLOGICAL SURGERY
Payer: COMMERCIAL

## 2021-11-13 ENCOUNTER — HOSPITAL ENCOUNTER (OUTPATIENT)
Dept: LAB | Facility: MEDICAL CENTER | Age: 31
End: 2021-11-13
Attending: NURSE PRACTITIONER
Payer: COMMERCIAL

## 2021-11-13 DIAGNOSIS — E05.90 SUBCLINICAL HYPERTHYROIDISM: ICD-10-CM

## 2021-11-13 LAB
ALBUMIN SERPL BCP-MCNC: 5.2 G/DL (ref 3.2–4.9)
ALBUMIN/GLOB SERPL: 1.9 G/DL
ALP SERPL-CCNC: 64 U/L (ref 30–99)
ALT SERPL-CCNC: 21 U/L (ref 2–50)
ANION GAP SERPL CALC-SCNC: 11 MMOL/L (ref 7–16)
AST SERPL-CCNC: 27 U/L (ref 12–45)
BILIRUB SERPL-MCNC: 0.5 MG/DL (ref 0.1–1.5)
BUN SERPL-MCNC: 10 MG/DL (ref 8–22)
CALCIUM SERPL-MCNC: 9.9 MG/DL (ref 8.5–10.5)
CHLORIDE SERPL-SCNC: 108 MMOL/L (ref 96–112)
CO2 SERPL-SCNC: 26 MMOL/L (ref 20–33)
CORTIS SERPL-MCNC: 9 UG/DL (ref 0–23)
CREAT SERPL-MCNC: 0.9 MG/DL (ref 0.5–1.4)
FASTING STATUS PATIENT QL REPORTED: NORMAL
GLOBULIN SER CALC-MCNC: 2.7 G/DL (ref 1.9–3.5)
GLUCOSE SERPL-MCNC: 95 MG/DL (ref 65–99)
POTASSIUM SERPL-SCNC: 4.4 MMOL/L (ref 3.6–5.5)
PROLACTIN SERPL-MCNC: 13 NG/ML (ref 2.1–17.7)
PROT SERPL-MCNC: 7.9 G/DL (ref 6–8.2)
SODIUM SERPL-SCNC: 145 MMOL/L (ref 135–145)
T3FREE SERPL-MCNC: 3.21 PG/ML (ref 2–4.4)
T4 FREE SERPL-MCNC: 1.14 NG/DL (ref 0.93–1.7)
T4 FREE SERPL-MCNC: 1.16 NG/DL (ref 0.93–1.7)
TESTOST SERPL-MCNC: 813 NG/DL (ref 175–781)
TSH SERPL DL<=0.005 MIU/L-ACNC: 0.77 UIU/ML (ref 0.38–5.33)
TSH SERPL DL<=0.005 MIU/L-ACNC: 0.78 UIU/ML (ref 0.38–5.33)

## 2021-11-13 PROCEDURE — 82533 TOTAL CORTISOL: CPT

## 2021-11-13 PROCEDURE — 84146 ASSAY OF PROLACTIN: CPT

## 2021-11-13 PROCEDURE — 84439 ASSAY OF FREE THYROXINE: CPT | Mod: 91

## 2021-11-13 PROCEDURE — 84443 ASSAY THYROID STIM HORMONE: CPT | Mod: 91

## 2021-11-13 PROCEDURE — 80053 COMPREHEN METABOLIC PANEL: CPT

## 2021-11-13 PROCEDURE — 83003 ASSAY GROWTH HORMONE (HGH): CPT

## 2021-11-13 PROCEDURE — 82024 ASSAY OF ACTH: CPT

## 2021-11-13 PROCEDURE — 84443 ASSAY THYROID STIM HORMONE: CPT

## 2021-11-13 PROCEDURE — 84481 FREE ASSAY (FT-3): CPT

## 2021-11-13 PROCEDURE — 36415 COLL VENOUS BLD VENIPUNCTURE: CPT

## 2021-11-13 PROCEDURE — 84305 ASSAY OF SOMATOMEDIN: CPT

## 2021-11-13 PROCEDURE — 82671 ASSAY OF ESTROGENS: CPT

## 2021-11-13 PROCEDURE — 84439 ASSAY OF FREE THYROXINE: CPT

## 2021-11-13 PROCEDURE — 84403 ASSAY OF TOTAL TESTOSTERONE: CPT

## 2021-11-16 ENCOUNTER — APPOINTMENT (RX ONLY)
Dept: URBAN - METROPOLITAN AREA CLINIC 36 | Facility: CLINIC | Age: 31
Setting detail: DERMATOLOGY
End: 2021-11-16

## 2021-11-16 DIAGNOSIS — Z41.9 ENCOUNTER FOR PROCEDURE FOR PURPOSES OTHER THAN REMEDYING HEALTH STATE, UNSPECIFIED: ICD-10-CM

## 2021-11-16 LAB
ACTH PLAS-MCNC: 17.6 PG/ML (ref 7.2–63.3)
IGF-I SERPL-MCNC: 153 NG/ML (ref 83–246)
IGF-I Z-SCORE SERPL: -0.1

## 2021-11-16 PROCEDURE — ? BOTOX

## 2021-11-16 NOTE — PROCEDURE: BOTOX
Lateral Platysmal Bands Units: 0
Forehead Units: 15
Show Masseter Units: Yes
Additional Area 2 Location: chin
Lot #: [id number]
Consent: Written consent obtained. Risks include but not limited to lid/brow ptosis, bruising, swelling, diplopia, temporary effect, incomplete chemical denervation.
Show Right And Left Pupillary Line Units: No
Additional Area 1 Location: upper lip
Additional Area 3 Location: masseter
Expiration Date (Month Year): 04/2024
Detail Level: Detailed
Price (Use Numbers Only, No Special Characters Or $): 0.00
Dilution (U/0.1 Cc): 0.6
Post-Care Instructions: Patient instructed to not lie down for 4 hours and limit physical activity for 24 hours. Patient instructed not to travel by airplane for 48 hours.

## 2021-11-17 ENCOUNTER — RX ONLY (OUTPATIENT)
Age: 31
Setting detail: RX ONLY
End: 2021-11-17

## 2021-11-17 RX ORDER — SUMATRIPTAN SUCCINATE 100 MG/1
1 ON ONSET OF HEADACHE TABLET, FILM COATED ORAL
Qty: 9 | Refills: 1 | Status: ERX | COMMUNITY
Start: 2021-11-17

## 2021-11-18 LAB
ESTRADIOL SERPL HS-MCNC: 17.4 PG/ML (ref 10–42)
ESTRONE SERPL-MCNC: 19.8 PG/ML (ref 9–36)

## 2021-11-19 LAB — GHRH SERPL-MCNC: 0.43 NG/ML (ref 0.05–3)

## 2021-12-17 ENCOUNTER — OFFICE VISIT (OUTPATIENT)
Dept: MEDICAL GROUP | Facility: MEDICAL CENTER | Age: 31
End: 2021-12-17
Payer: COMMERCIAL

## 2021-12-17 VITALS
DIASTOLIC BLOOD PRESSURE: 70 MMHG | TEMPERATURE: 98.3 F | HEIGHT: 65 IN | HEART RATE: 75 BPM | OXYGEN SATURATION: 97 % | BODY MASS INDEX: 24.32 KG/M2 | SYSTOLIC BLOOD PRESSURE: 118 MMHG | WEIGHT: 146 LBS

## 2021-12-17 DIAGNOSIS — E05.90 SUBCLINICAL HYPERTHYROIDISM: ICD-10-CM

## 2021-12-17 DIAGNOSIS — F41.8 MIXED ANXIETY DEPRESSIVE DISORDER: ICD-10-CM

## 2021-12-17 DIAGNOSIS — D35.2 PITUITARY MICROADENOMA (HCC): ICD-10-CM

## 2021-12-17 PROCEDURE — 99214 OFFICE O/P EST MOD 30 MIN: CPT | Performed by: NURSE PRACTITIONER

## 2021-12-17 RX ORDER — SUMATRIPTAN 100 MG/1
TABLET, FILM COATED ORAL
COMMUNITY
Start: 2021-12-01

## 2021-12-17 RX ORDER — CITALOPRAM 40 MG/1
40 TABLET ORAL DAILY
Qty: 30 TABLET | Refills: 6 | Status: SHIPPED | OUTPATIENT
Start: 2021-12-17 | End: 2022-01-17

## 2021-12-17 ASSESSMENT — FIBROSIS 4 INDEX: FIB4 SCORE: 0.99

## 2021-12-17 NOTE — ASSESSMENT & PLAN NOTE
Chronic. Worsening. Currently taking sertraline 100 mg daily, has not helped. Would like to try a different medication.     Has had a lot of family stress, 3 deaths in the family due to COVID over the past 6 months.     Also recently diagnosed with subclinical hyperthyroidism which is likely exacerbating his anxiety.    Has never seen therapy or psychiatry in the past. Has been keeping himself busy and walking. Good support with wife.     Depression Screen (PHQ-2/PHQ-9) 5/28/2021   PHQ-2 Total Score -   PHQ-2 Total Score 2   PHQ-9 Total Score 8       Interpretation of PHQ-9 Total Score   Score Severity     5-9 Mild Depression

## 2021-12-18 NOTE — ASSESSMENT & PLAN NOTE
Diagnosed on labs during hospitalization in 2018 for pneumonia, also had low TSH in 2017. Repeat labs showed continued low TSH.      Now established with endocrinology, labs currently stable on PTU 50 mg BID (prescribed TID, but had GI side effects). Also taking metoprolol 12.5 mg daily.    Thyroid US in 2017 which was normal, homogenous, no nodules. Did have some reactive lymph nodes.     He reports no change in symptoms. Continue to have significant anxiety which is worse with work stress. His anxiety has been chronic, currently on zolft for this but doesn't feel that it is helping.

## 2021-12-18 NOTE — PROGRESS NOTES
Subjective:   Gabriel Lemos is a 31 y.o. male here today for anxiety follow up and subclinical hyperthyroidism    Mixed anxiety depressive disorder  Chronic. Worsening. Currently taking sertraline 100 mg daily, has not helped. Would like to try a different medication.     Has had a lot of family stress, 3 deaths in the family due to COVID over the past 6 months.     Also recently diagnosed with subclinical hyperthyroidism which is likely exacerbating his anxiety.    Has never seen therapy or psychiatry in the past. Has been keeping himself busy and walking. Good support with wife.     Depression Screen (PHQ-2/PHQ-9) 5/28/2021   PHQ-2 Total Score -   PHQ-2 Total Score 2   PHQ-9 Total Score 8       Interpretation of PHQ-9 Total Score   Score Severity     5-9 Mild Depression           Subclinical hyperthyroidism  Diagnosed on labs during hospitalization in 2018 for pneumonia, also had low TSH in 2017. Repeat labs showed continued low TSH.      Now established with endocrinology, labs currently stable on PTU 50 mg BID (prescribed TID, but had GI side effects). Also taking metoprolol 12.5 mg daily.    Thyroid US in 2017 which was normal, homogenous, no nodules. Did have some reactive lymph nodes.     He reports no change in symptoms. Continue to have significant anxiety which is worse with work stress. His anxiety has been chronic, currently on zolft for this but doesn't feel that it is helping.        Current medicines (including changes today)  Current Outpatient Medications   Medication Sig Dispense Refill   • citalopram (CELEXA) 40 MG Tab Take 1 Tablet by mouth every day. 30 Tablet 6   • propylthiouracil (PTU) 50 MG Tab TAKE 1 TABLET BY MOUTH THREE TIMES A  Tablet 1   • metoprolol SR (TOPROL XL) 25 MG TABLET SR 24 HR Take 0.5 Tablets by mouth every day. 30 Tablet 2   • sumatriptan (IMITREX) 100 MG tablet        No current facility-administered medications for this visit.     He  has no past medical  "history on file.    ROS   No chest pain, no shortness of breath, no abdominal pain  Positive ROS as per HPI.  All other systems reviewed and are negative.     Objective:     /70 (BP Location: Right arm, Patient Position: Sitting, BP Cuff Size: Adult)   Pulse 75   Temp 36.8 °C (98.3 °F) (Temporal)   Ht 1.651 m (5' 5\")   Wt 66.2 kg (146 lb)   SpO2 97%  Body mass index is 24.3 kg/m².     Physical Exam:  Constitutional: Alert, no distress.  Skin: Warm, dry, good turgor, no rashes in visible areas.  Eye: Equal, round and reactive, conjunctiva clear, lids normal.  ENMT: Mask in place  Respiratory: Unlabored respiratory effort  Psych: Alert and oriented x3, normal affect and mood.        Assessment and Plan:   The following treatment plan was discussed    1. Subclinical hyperthyroidism  Stable  Continue medication and follow up per endocrinology  He would like to see a different endocrinologist at this time, does not feel that he and his current provider are a good fit  - Referral to Endocrinology    2. Pituitary microadenoma (HCC)  As above  - Referral to Endocrinology    3. Mixed anxiety depressive disorder  Unstable  Stop sertraline, Start citalopram  Follow up with psychiatry and behavioral health  - Referral to Behavioral Health  - Referral to Psychiatry  - citalopram (CELEXA) 40 MG Tab; Take 1 Tablet by mouth every day.  Dispense: 30 Tablet; Refill: 6      Followup: Return in about 4 weeks (around 1/14/2022).    I have placed the below orders and discussed them with an approved delegating provider. The MA is performing the below orders under the direction of Dr. Reynoso           "

## 2021-12-23 ENCOUNTER — OFFICE VISIT (OUTPATIENT)
Dept: URGENT CARE | Facility: CLINIC | Age: 31
End: 2021-12-23
Payer: COMMERCIAL

## 2021-12-23 VITALS
BODY MASS INDEX: 24.53 KG/M2 | WEIGHT: 147.2 LBS | DIASTOLIC BLOOD PRESSURE: 68 MMHG | HEIGHT: 65 IN | SYSTOLIC BLOOD PRESSURE: 116 MMHG | TEMPERATURE: 98.4 F | OXYGEN SATURATION: 96 % | RESPIRATION RATE: 16 BRPM | HEART RATE: 91 BPM

## 2021-12-23 DIAGNOSIS — K04.7 TOOTH INFECTION: ICD-10-CM

## 2021-12-23 DIAGNOSIS — R22.0 JAW SWELLING: ICD-10-CM

## 2021-12-23 DIAGNOSIS — K08.9 POOR DENTITION: ICD-10-CM

## 2021-12-23 PROCEDURE — 99213 OFFICE O/P EST LOW 20 MIN: CPT | Performed by: NURSE PRACTITIONER

## 2021-12-23 RX ORDER — AMOXICILLIN 500 MG/1
500 CAPSULE ORAL 3 TIMES DAILY
Qty: 21 CAPSULE | Refills: 0 | Status: SHIPPED | OUTPATIENT
Start: 2021-12-23 | End: 2021-12-30

## 2021-12-23 ASSESSMENT — FIBROSIS 4 INDEX: FIB4 SCORE: 0.99

## 2021-12-24 NOTE — PROGRESS NOTES
"Subjective:   Gabriel Lemos is a 31 y.o. male who presents for Other (Pt has a (L) swollen jaw x today )       HPI  Pt presents for evaluation of a new problem, reports left swollen lower jaw that began this morning.  He has minimal pain, but has known dental caries.  He has an appt with a dentist in 2 weeks.  He has nor tried anything for his symptoms.  Denies fever, chills, or systemic symptoms.    Review of Systems   Constitutional: Negative.    HENT:        Swollen left lower jaw   Eyes: Negative.    Respiratory: Negative.    Cardiovascular: Negative.    Gastrointestinal: Negative.    Genitourinary: Negative.    Musculoskeletal: Negative.    Skin: Negative.    Neurological: Negative.    Psychiatric/Behavioral: Negative.    All other systems reviewed and are negative.      MEDS:   Current Outpatient Medications:   •  sumatriptan (IMITREX) 100 MG tablet, , Disp: , Rfl:   •  citalopram (CELEXA) 40 MG Tab, Take 1 Tablet by mouth every day., Disp: 30 Tablet, Rfl: 6  •  propylthiouracil (PTU) 50 MG Tab, TAKE 1 TABLET BY MOUTH THREE TIMES A DAY, Disp: 270 Tablet, Rfl: 1  •  metoprolol SR (TOPROL XL) 25 MG TABLET SR 24 HR, Take 0.5 Tablets by mouth every day., Disp: 30 Tablet, Rfl: 2  ALLERGIES: No Known Allergies    Patient's PMH, SocHx, SurgHx, FamHx, Drug allergies and medications were reviewed.     Objective:   /68 (BP Location: Left arm, Patient Position: Sitting, BP Cuff Size: Adult)   Pulse 91   Temp 36.9 °C (98.4 °F) (Temporal)   Resp 16   Ht 1.651 m (5' 5\")   Wt 66.8 kg (147 lb 3.2 oz)   SpO2 96%   BMI 24.50 kg/m²     Physical Exam  Vitals and nursing note reviewed.   Constitutional:       General: He is awake.      Appearance: Normal appearance. He is well-developed.   HENT:      Head: Normocephalic and atraumatic.      Right Ear: External ear normal.      Left Ear: External ear normal.      Nose: Nose normal.      Mouth/Throat:      Lips: Pink.      Mouth: Mucous membranes are moist.      " Dentition: Abnormal dentition. Gingival swelling and dental caries present.      Pharynx: Oropharynx is clear. Uvula midline.     Eyes:      General: Lids are normal.      Extraocular Movements: Extraocular movements intact.      Conjunctiva/sclera: Conjunctivae normal.   Cardiovascular:      Rate and Rhythm: Normal rate and regular rhythm.   Pulmonary:      Effort: Pulmonary effort is normal.      Breath sounds: Normal breath sounds.   Musculoskeletal:         General: Normal range of motion.      Cervical back: Normal range of motion.   Skin:     General: Skin is warm and dry.   Neurological:      Mental Status: He is alert and oriented to person, place, and time.   Psychiatric:         Mood and Affect: Mood normal.         Behavior: Behavior normal. Behavior is cooperative.         Thought Content: Thought content normal.         Judgment: Judgment normal.         Assessment/Plan:   Assessment    1. Tooth infection  - amoxicillin (AMOXIL) 500 MG Cap; Take 1 Capsule by mouth 3 times a day for 7 days.  Dispense: 21 Capsule; Refill: 0    2. Jaw swelling  - amoxicillin (AMOXIL) 500 MG Cap; Take 1 Capsule by mouth 3 times a day for 7 days.  Dispense: 21 Capsule; Refill: 0    3. Poor dentition  - amoxicillin (AMOXIL) 500 MG Cap; Take 1 Capsule by mouth 3 times a day for 7 days.  Dispense: 21 Capsule; Refill: 0    Vital signs stable at today's acute urgent care visit. Begin medications as listed. Discussed management options (risks, benefits, and alternatives to treatment).     Advised the patient to follow-up with the primary care provider for recheck, reevaluation, and/or consideration of further management if necessary. Return to urgent care with any worsening symptoms or if there is no improvement in their current condition. Red flags discussed and indications to immediately call 911 or present to the ED.  All questions were encouraged and answered to the patient's satisfaction and understanding, and they agree to  the plan of care.     I personally reviewed prior external notes and test results pertinent to today's visit.  I have independently reviewed and interpreted all diagnostics ordered during this urgent care acute visit. Time spent evaluating this patient was a minimum of 30 minutes and includes preparing for visit, counseling/education, exam, evaluation, obtaining history, and ordering lab/test/procedures.      Please note that this dictation was created using voice recognition software. I have made a reasonable attempt to correct obvious errors, but I expect that there are errors of grammar and possibly content that I did not discover before finalizing the note.

## 2021-12-28 ASSESSMENT — ENCOUNTER SYMPTOMS
EYES NEGATIVE: 1
RESPIRATORY NEGATIVE: 1
NEUROLOGICAL NEGATIVE: 1
CARDIOVASCULAR NEGATIVE: 1
GASTROINTESTINAL NEGATIVE: 1
CONSTITUTIONAL NEGATIVE: 1
PSYCHIATRIC NEGATIVE: 1
MUSCULOSKELETAL NEGATIVE: 1

## 2022-01-14 DIAGNOSIS — F41.8 MIXED ANXIETY DEPRESSIVE DISORDER: ICD-10-CM

## 2022-01-17 RX ORDER — CITALOPRAM 40 MG/1
40 TABLET ORAL DAILY
Qty: 90 TABLET | Refills: 2 | Status: SHIPPED | OUTPATIENT
Start: 2022-01-17

## 2022-01-21 ENCOUNTER — OFFICE VISIT (OUTPATIENT)
Dept: MEDICAL GROUP | Facility: MEDICAL CENTER | Age: 32
End: 2022-01-21
Payer: COMMERCIAL

## 2022-01-21 VITALS
TEMPERATURE: 98.5 F | DIASTOLIC BLOOD PRESSURE: 72 MMHG | OXYGEN SATURATION: 96 % | HEART RATE: 76 BPM | WEIGHT: 146 LBS | BODY MASS INDEX: 24.32 KG/M2 | HEIGHT: 65 IN | SYSTOLIC BLOOD PRESSURE: 122 MMHG

## 2022-01-21 DIAGNOSIS — I73.00 RAYNAUD'S PHENOMENON WITHOUT GANGRENE: ICD-10-CM

## 2022-01-21 DIAGNOSIS — F41.8 MIXED ANXIETY DEPRESSIVE DISORDER: ICD-10-CM

## 2022-01-21 PROCEDURE — 99214 OFFICE O/P EST MOD 30 MIN: CPT | Performed by: NURSE PRACTITIONER

## 2022-01-21 RX ORDER — NIFEDIPINE 30 MG
30 TABLET, EXTENDED RELEASE ORAL DAILY
Qty: 30 TABLET | Refills: 3 | Status: SHIPPED | OUTPATIENT
Start: 2022-01-21 | End: 2022-02-06

## 2022-01-21 ASSESSMENT — ANXIETY QUESTIONNAIRES
6. BECOMING EASILY ANNOYED OR IRRITABLE: SEVERAL DAYS
1. FEELING NERVOUS, ANXIOUS, OR ON EDGE: SEVERAL DAYS
7. FEELING AFRAID AS IF SOMETHING AWFUL MIGHT HAPPEN: NOT AT ALL
4. TROUBLE RELAXING: MORE THAN HALF THE DAYS
GAD7 TOTAL SCORE: 6
3. WORRYING TOO MUCH ABOUT DIFFERENT THINGS: NOT AT ALL
5. BEING SO RESTLESS THAT IT IS HARD TO SIT STILL: SEVERAL DAYS
2. NOT BEING ABLE TO STOP OR CONTROL WORRYING: SEVERAL DAYS

## 2022-01-21 ASSESSMENT — FIBROSIS 4 INDEX: FIB4 SCORE: 0.99

## 2022-01-21 ASSESSMENT — PATIENT HEALTH QUESTIONNAIRE - PHQ9: CLINICAL INTERPRETATION OF PHQ2 SCORE: 0

## 2022-01-21 NOTE — ASSESSMENT & PLAN NOTE
Chronic. Switched from sertraline 100 mg daily to citalopram 40 mg daily. Has noticed a significant improvement in both depression and anxiety     Has had a lot of family stress, 3 deaths in the family due to COVID over the past 6 months.     Also recently diagnosed with subclinical hyperthyroidism which is likely exacerbating his anxiety.    Has never seen therapy or psychiatry in the past. Has been keeping himself busy and walking. Good support with wife.     Depression Screen (PHQ-2/PHQ-9) 5/28/2021   PHQ-2 Total Score -   PHQ-2 Total Score 2   PHQ-9 Total Score 8       Interpretation of PHQ-9 Total Score   Score Severity     5-9 Mild Depression

## 2022-01-30 PROBLEM — I73.00 RAYNAUD'S PHENOMENON WITHOUT GANGRENE: Status: ACTIVE | Noted: 2022-01-30

## 2022-01-30 NOTE — ASSESSMENT & PLAN NOTE
Chronic. Has had issues with extremely cold hands and feet for many years. Does report discoloration of finger tips, will be more pail/white, and numb. Tries to wear gloves in the colder months. Doesn't have symptoms in the warmer months. He would like to try something to help with these symptoms as they are quite uncomfortable.

## 2022-01-30 NOTE — PROGRESS NOTES
Subjective:   Gabriel Lemos is a 31 y.o. male here today for anxiety/depression follow up    Mixed anxiety depressive disorder  Chronic. Switched from sertraline 100 mg daily to citalopram 40 mg daily. Has noticed a significant improvement in both depression and anxiety     Has had a lot of family stress, 3 deaths in the family due to COVID over the past 6 months.     Also recently diagnosed with subclinical hyperthyroidism which is likely exacerbating his anxiety.    Has never seen therapy or psychiatry in the past. Has been keeping himself busy and walking. Good support with wife.     Depression Screen (PHQ-2/PHQ-9) 5/28/2021   PHQ-2 Total Score -   PHQ-2 Total Score 2   PHQ-9 Total Score 8       Interpretation of PHQ-9 Total Score   Score Severity     5-9 Mild Depression           Raynaud's phenomenon without gangrene  Chronic. Has had issues with extremely cold hands and feet for many years. Does report discoloration of finger tips, will be more pail/white, and numb. Tries to wear gloves in the colder months. Doesn't have symptoms in the warmer months. He would like to try something to help with these symptoms as they are quite uncomfortable.          Current medicines (including changes today)  Current Outpatient Medications   Medication Sig Dispense Refill   • NIFEdipine (ADALAT CC) 30 MG CR tablet Take 1 Tablet by mouth every day. 30 Tablet 3   • citalopram (CELEXA) 40 MG Tab TAKE 1 TABLET BY MOUTH EVERY DAY 90 Tablet 2   • sumatriptan (IMITREX) 100 MG tablet      • propylthiouracil (PTU) 50 MG Tab TAKE 1 TABLET BY MOUTH THREE TIMES A  Tablet 1   • metoprolol SR (TOPROL XL) 25 MG TABLET SR 24 HR Take 0.5 Tablets by mouth every day. 30 Tablet 2     No current facility-administered medications for this visit.     He  has no past medical history on file.    ROS   No chest pain, no shortness of breath, no abdominal pain  Positive ROS as per HPI.  All other systems reviewed and are negative.      "Objective:     /72 (BP Location: Right arm, Patient Position: Sitting, BP Cuff Size: Adult)   Pulse 76   Temp 36.9 °C (98.5 °F) (Temporal)   Ht 1.651 m (5' 5\")   Wt 66.2 kg (146 lb)   SpO2 96%  Body mass index is 24.3 kg/m².     Physical Exam:  Constitutional: Alert, no distress.  Skin: Warm, dry, good turgor, no rashes in visible areas.  Eye: Equal, round and reactive, conjunctiva clear, lids normal.  ENMT: mask in place  Respiratory: Unlabored respiratory effort  Psych: Alert and oriented x3, normal affect and mood.        Assessment and Plan:   The following treatment plan was discussed    1. Mixed anxiety depressive disorder  Stable  Continue citalopram 40 mg daily    2. 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  Dispense: 30 Tablet; Refill: 3      Followup: Return in about 6 months (around 7/21/2022).    I have placed the below orders and discussed them with an approved delegating provider. The MA is performing the below orders under the direction of Dr. Reynoso           "

## 2022-02-04 DIAGNOSIS — I73.00 RAYNAUD'S PHENOMENON WITHOUT GANGRENE: ICD-10-CM

## 2022-02-06 RX ORDER — NIFEDIPINE 30 MG
30 TABLET, EXTENDED RELEASE ORAL DAILY
Qty: 90 TABLET | Refills: 2 | Status: SHIPPED | OUTPATIENT
Start: 2022-02-06

## 2022-03-29 ENCOUNTER — APPOINTMENT (RX ONLY)
Dept: URBAN - METROPOLITAN AREA CLINIC 4 | Facility: CLINIC | Age: 32
Setting detail: DERMATOLOGY
End: 2022-03-29

## 2022-05-20 ENCOUNTER — TELEPHONE (OUTPATIENT)
Dept: SCHEDULING | Facility: IMAGING CENTER | Age: 32
End: 2022-05-20

## 2022-06-25 SDOH — ECONOMIC STABILITY: HOUSING INSECURITY
IN THE LAST 12 MONTHS, WAS THERE A TIME WHEN YOU DID NOT HAVE A STEADY PLACE TO SLEEP OR SLEPT IN A SHELTER (INCLUDING NOW)?: PATIENT REFUSED

## 2022-06-25 SDOH — ECONOMIC STABILITY: FOOD INSECURITY: WITHIN THE PAST 12 MONTHS, THE FOOD YOU BOUGHT JUST DIDN'T LAST AND YOU DIDN'T HAVE MONEY TO GET MORE.: SOMETIMES TRUE

## 2022-06-25 SDOH — HEALTH STABILITY: PHYSICAL HEALTH: ON AVERAGE, HOW MANY MINUTES DO YOU ENGAGE IN EXERCISE AT THIS LEVEL?: 40 MIN

## 2022-06-25 SDOH — HEALTH STABILITY: PHYSICAL HEALTH: ON AVERAGE, HOW MANY DAYS PER WEEK DO YOU ENGAGE IN MODERATE TO STRENUOUS EXERCISE (LIKE A BRISK WALK)?: 5 DAYS

## 2022-06-25 SDOH — ECONOMIC STABILITY: INCOME INSECURITY: IN THE LAST 12 MONTHS, WAS THERE A TIME WHEN YOU WERE NOT ABLE TO PAY THE MORTGAGE OR RENT ON TIME?: NO

## 2022-06-25 SDOH — ECONOMIC STABILITY: HOUSING INSECURITY

## 2022-06-25 SDOH — ECONOMIC STABILITY: TRANSPORTATION INSECURITY
IN THE PAST 12 MONTHS, HAS LACK OF TRANSPORTATION KEPT YOU FROM MEETINGS, WORK, OR FROM GETTING THINGS NEEDED FOR DAILY LIVING?: NO

## 2022-06-25 SDOH — ECONOMIC STABILITY: FOOD INSECURITY: WITHIN THE PAST 12 MONTHS, YOU WORRIED THAT YOUR FOOD WOULD RUN OUT BEFORE YOU GOT MONEY TO BUY MORE.: SOMETIMES TRUE

## 2022-06-25 SDOH — ECONOMIC STABILITY: INCOME INSECURITY: HOW HARD IS IT FOR YOU TO PAY FOR THE VERY BASICS LIKE FOOD, HOUSING, MEDICAL CARE, AND HEATING?: NOT HARD AT ALL

## 2022-06-25 SDOH — HEALTH STABILITY: MENTAL HEALTH
STRESS IS WHEN SOMEONE FEELS TENSE, NERVOUS, ANXIOUS, OR CAN'T SLEEP AT NIGHT BECAUSE THEIR MIND IS TROUBLED. HOW STRESSED ARE YOU?: VERY MUCH

## 2022-06-25 ASSESSMENT — LIFESTYLE VARIABLES
SKIP TO QUESTIONS 9-10: 0
HOW MANY STANDARD DRINKS CONTAINING ALCOHOL DO YOU HAVE ON A TYPICAL DAY: PATIENT DOES NOT DRINK
HOW OFTEN DO YOU HAVE SIX OR MORE DRINKS ON ONE OCCASION: LESS THAN MONTHLY
HOW OFTEN DO YOU HAVE A DRINK CONTAINING ALCOHOL: MONTHLY OR LESS
AUDIT-C TOTAL SCORE: 2

## 2022-06-25 ASSESSMENT — SOCIAL DETERMINANTS OF HEALTH (SDOH)
HOW OFTEN DO YOU HAVE A DRINK CONTAINING ALCOHOL: MONTHLY OR LESS
DO YOU BELONG TO ANY CLUBS OR ORGANIZATIONS SUCH AS CHURCH GROUPS UNIONS, FRATERNAL OR ATHLETIC GROUPS, OR SCHOOL GROUPS?: YES
HOW OFTEN DO YOU GET TOGETHER WITH FRIENDS OR RELATIVES?: NEVER
HOW OFTEN DO YOU ATTEND CHURCH OR RELIGIOUS SERVICES?: 1 TO 4 TIMES PER YEAR
IN A TYPICAL WEEK, HOW MANY TIMES DO YOU TALK ON THE PHONE WITH FAMILY, FRIENDS, OR NEIGHBORS?: ONCE A WEEK
WITHIN THE PAST 12 MONTHS, YOU WORRIED THAT YOUR FOOD WOULD RUN OUT BEFORE YOU GOT THE MONEY TO BUY MORE: SOMETIMES TRUE
HOW OFTEN DO YOU GET TOGETHER WITH FRIENDS OR RELATIVES?: NEVER
DO YOU BELONG TO ANY CLUBS OR ORGANIZATIONS SUCH AS CHURCH GROUPS UNIONS, FRATERNAL OR ATHLETIC GROUPS, OR SCHOOL GROUPS?: YES
HOW MANY DRINKS CONTAINING ALCOHOL DO YOU HAVE ON A TYPICAL DAY WHEN YOU ARE DRINKING: PATIENT DOES NOT DRINK
HOW HARD IS IT FOR YOU TO PAY FOR THE VERY BASICS LIKE FOOD, HOUSING, MEDICAL CARE, AND HEATING?: NOT HARD AT ALL
IN A TYPICAL WEEK, HOW MANY TIMES DO YOU TALK ON THE PHONE WITH FAMILY, FRIENDS, OR NEIGHBORS?: ONCE A WEEK
HOW OFTEN DO YOU HAVE SIX OR MORE DRINKS ON ONE OCCASION: LESS THAN MONTHLY
HOW OFTEN DO YOU ATTEND CHURCH OR RELIGIOUS SERVICES?: 1 TO 4 TIMES PER YEAR

## 2022-06-28 ENCOUNTER — OFFICE VISIT (OUTPATIENT)
Dept: MEDICAL GROUP | Facility: PHYSICIAN GROUP | Age: 32
End: 2022-06-28
Payer: COMMERCIAL

## 2022-06-28 ENCOUNTER — HOSPITAL ENCOUNTER (OUTPATIENT)
Dept: LAB | Facility: MEDICAL CENTER | Age: 32
End: 2022-06-28
Attending: FAMILY MEDICINE
Payer: COMMERCIAL

## 2022-06-28 VITALS
TEMPERATURE: 97.7 F | SYSTOLIC BLOOD PRESSURE: 108 MMHG | HEART RATE: 98 BPM | BODY MASS INDEX: 21.59 KG/M2 | HEIGHT: 65 IN | WEIGHT: 129.6 LBS | DIASTOLIC BLOOD PRESSURE: 60 MMHG | OXYGEN SATURATION: 98 %

## 2022-06-28 DIAGNOSIS — R79.89 ABNORMAL SERUM THYROID STIMULATING HORMONE (TSH) LEVEL: ICD-10-CM

## 2022-06-28 DIAGNOSIS — F90.9 ATTENTION DEFICIT HYPERACTIVITY DISORDER (ADHD), UNSPECIFIED ADHD TYPE: ICD-10-CM

## 2022-06-28 DIAGNOSIS — I73.00 RAYNAUD'S PHENOMENON WITHOUT GANGRENE: ICD-10-CM

## 2022-06-28 DIAGNOSIS — Z00.00 HEALTH CARE MAINTENANCE: ICD-10-CM

## 2022-06-28 DIAGNOSIS — E05.90 SUBCLINICAL HYPERTHYROIDISM: ICD-10-CM

## 2022-06-28 DIAGNOSIS — D35.2 PITUITARY ADENOMA (HCC): ICD-10-CM

## 2022-06-28 LAB
ALBUMIN SERPL BCP-MCNC: 4.7 G/DL (ref 3.2–4.9)
ALBUMIN/GLOB SERPL: 2 G/DL
ALP SERPL-CCNC: 78 U/L (ref 30–99)
ALT SERPL-CCNC: 33 U/L (ref 2–50)
ANION GAP SERPL CALC-SCNC: 10 MMOL/L (ref 7–16)
AST SERPL-CCNC: 58 U/L (ref 12–45)
BILIRUB SERPL-MCNC: 0.6 MG/DL (ref 0.1–1.5)
BUN SERPL-MCNC: 7 MG/DL (ref 8–22)
CALCIUM SERPL-MCNC: 9.2 MG/DL (ref 8.5–10.5)
CHLORIDE SERPL-SCNC: 100 MMOL/L (ref 96–112)
CO2 SERPL-SCNC: 26 MMOL/L (ref 20–33)
CREAT SERPL-MCNC: 0.85 MG/DL (ref 0.5–1.4)
ERYTHROCYTE [DISTWIDTH] IN BLOOD BY AUTOMATED COUNT: 42.7 FL (ref 35.9–50)
GFR SERPLBLD CREATININE-BSD FMLA CKD-EPI: 119 ML/MIN/1.73 M 2
GLOBULIN SER CALC-MCNC: 2.3 G/DL (ref 1.9–3.5)
GLUCOSE SERPL-MCNC: 88 MG/DL (ref 65–99)
HCT VFR BLD AUTO: 38.9 % (ref 42–52)
HGB BLD-MCNC: 13.4 G/DL (ref 14–18)
MCH RBC QN AUTO: 30.8 PG (ref 27–33)
MCHC RBC AUTO-ENTMCNC: 34.4 G/DL (ref 33.7–35.3)
MCV RBC AUTO: 89.4 FL (ref 81.4–97.8)
PLATELET # BLD AUTO: 250 K/UL (ref 164–446)
PMV BLD AUTO: 10.4 FL (ref 9–12.9)
POTASSIUM SERPL-SCNC: 3.7 MMOL/L (ref 3.6–5.5)
PROT SERPL-MCNC: 7 G/DL (ref 6–8.2)
RBC # BLD AUTO: 4.35 M/UL (ref 4.7–6.1)
SODIUM SERPL-SCNC: 136 MMOL/L (ref 135–145)
T3 SERPL-MCNC: 112 NG/DL (ref 60–181)
T4 FREE SERPL-MCNC: 1.04 NG/DL (ref 0.93–1.7)
TSH SERPL DL<=0.005 MIU/L-ACNC: 0.28 UIU/ML (ref 0.38–5.33)
WBC # BLD AUTO: 4.4 K/UL (ref 4.8–10.8)

## 2022-06-28 PROCEDURE — 85027 COMPLETE CBC AUTOMATED: CPT

## 2022-06-28 PROCEDURE — 84480 ASSAY TRIIODOTHYRONINE (T3): CPT

## 2022-06-28 PROCEDURE — 84439 ASSAY OF FREE THYROXINE: CPT

## 2022-06-28 PROCEDURE — 84443 ASSAY THYROID STIM HORMONE: CPT

## 2022-06-28 PROCEDURE — 80053 COMPREHEN METABOLIC PANEL: CPT

## 2022-06-28 PROCEDURE — 99213 OFFICE O/P EST LOW 20 MIN: CPT | Performed by: FAMILY MEDICINE

## 2022-06-28 PROCEDURE — 36415 COLL VENOUS BLD VENIPUNCTURE: CPT

## 2022-06-28 RX ORDER — LISDEXAMFETAMINE DIMESYLATE 60 MG/1
CAPSULE ORAL
COMMUNITY
Start: 2022-04-29 | End: 2022-06-28

## 2022-06-28 RX ORDER — DEXTROAMPHETAMINE SACCHARATE, AMPHETAMINE ASPARTATE, DEXTROAMPHETAMINE SULFATE AND AMPHETAMINE SULFATE 5; 5; 5; 5 MG/1; MG/1; MG/1; MG/1
20 TABLET ORAL 2 TIMES DAILY
COMMUNITY

## 2022-06-28 RX ORDER — ALPRAZOLAM 0.5 MG/1
0.5 TABLET ORAL NIGHTLY PRN
COMMUNITY

## 2022-06-28 RX ORDER — HYDROXYZINE HYDROCHLORIDE 25 MG/1
TABLET, FILM COATED ORAL
COMMUNITY
Start: 2022-04-21

## 2022-06-28 ASSESSMENT — ENCOUNTER SYMPTOMS
HEADACHES: 0
COUGH: 0
MYALGIAS: 0
NAUSEA: 0
VOMITING: 0
DOUBLE VISION: 0
SORE THROAT: 0
PALPITATIONS: 0
CHILLS: 0
DIZZINESS: 0
SHORTNESS OF BREATH: 0
FEVER: 0
BLURRED VISION: 0

## 2022-06-28 ASSESSMENT — FIBROSIS 4 INDEX: FIB4 SCORE: 0.99

## 2022-06-28 NOTE — PROGRESS NOTES
"Subjective:     CC: est care  Check up    HPI:   Gabriel presents today with     1) est care  2) labs  tsh  3) thyroid US   Problem   Health Care Maintenance   Pituitary Adenoma (Hcc)    Chronic  No new red flag symptoms  F/u with neurosurgery oct/nov     Adhd    Chronic stable  Cont f/u with psych      Raynaud's Phenomenon Without Gangrene   Subclinical Hyperthyroidism       Current Outpatient Medications Ordered in Epic   Medication Sig Dispense Refill   • hydrOXYzine HCl (ATARAX) 25 MG Tab      • ALPRAZolam (XANAX) 0.5 MG Tab Take 0.5 mg by mouth at bedtime as needed for Sleep.     • amphetamine-dextroamphetamine (ADDERALL, 20MG,) 20 MG Tab Take 20 mg by mouth 2 times a day.     • NIFEdipine (ADALAT CC) 30 MG CR tablet TAKE 1 TABLET BY MOUTH EVERY DAY 90 Tablet 2   • citalopram (CELEXA) 40 MG Tab TAKE 1 TABLET BY MOUTH EVERY DAY 90 Tablet 2   • sumatriptan (IMITREX) 100 MG tablet        No current Epic-ordered facility-administered medications on file.     ROS:  Review of Systems   Constitutional: Negative for chills and fever.   HENT: Negative for ear pain and sore throat.    Eyes: Negative for blurred vision and double vision.   Respiratory: Negative for cough and shortness of breath.    Cardiovascular: Negative for chest pain and palpitations.   Gastrointestinal: Negative for nausea and vomiting.   Genitourinary: Negative for dysuria and hematuria.   Musculoskeletal: Negative for myalgias.   Neurological: Negative for dizziness and headaches.       Objective:     Exam:  /60 (BP Location: Left arm, Patient Position: Sitting, BP Cuff Size: Adult)   Pulse 98   Temp 36.5 °C (97.7 °F) (Temporal)   Ht 1.651 m (5' 5\")   Wt 58.8 kg (129 lb 9.6 oz)   SpO2 98%   BMI 21.57 kg/m²  Body mass index is 21.57 kg/m².    Physical Exam  Constitutional:       General: He is not in acute distress.     Appearance: Normal appearance. He is not ill-appearing, toxic-appearing or diaphoretic.   HENT:      Head: Normocephalic " and atraumatic.   Eyes:      General: No scleral icterus.        Right eye: No discharge.         Left eye: No discharge.      Extraocular Movements: Extraocular movements intact.      Conjunctiva/sclera: Conjunctivae normal.   Cardiovascular:      Rate and Rhythm: Regular rhythm.      Pulses: Normal pulses.      Heart sounds: Normal heart sounds. No murmur heard.  Pulmonary:      Effort: Pulmonary effort is normal. No respiratory distress.      Breath sounds: Normal breath sounds.   Abdominal:      General: There is no distension.   Musculoskeletal:      Cervical back: Normal range of motion and neck supple. No rigidity or tenderness.   Lymphadenopathy:      Cervical: No cervical adenopathy.   Neurological:      Coordination: Coordination normal.      Gait: Gait normal.         Assessment & Plan:     31 y.o. male with the following -     Problem List Items Addressed This Visit     Abnormal serum thyroid stimulating hormone (TSH) level    Relevant Orders    TSH    FREE THYROXINE    TRIIDOTHYRONINE    US-THYROID    Subclinical hyperthyroidism     F/u repeat           Relevant Orders    TSH    FREE THYROXINE    TRIIDOTHYRONINE    US-THYROID    Raynaud's phenomenon without gangrene     Chronic stable  No refill needed at this time           Health care maintenance    Relevant Orders    CBC WITHOUT DIFFERENTIAL    Comp Metabolic Panel    Pituitary adenoma (HCC)    ADHD        No follow-ups on file.    Please note that this dictation was created using voice recognition software. I have made every reasonable attempt to correct obvious errors, but I expect that there are errors of grammar and possibly content that I did not discover before finalizing the note.

## 2022-07-19 ENCOUNTER — RX ONLY (OUTPATIENT)
Age: 32
Setting detail: RX ONLY
End: 2022-07-19

## 2022-07-19 RX ORDER — AMOXICILLIN 500 MG/1
1 CAP CAPSULE ORAL TID
Qty: 21 | Refills: 2 | Status: ERX | COMMUNITY
Start: 2022-07-18

## 2022-07-29 ENCOUNTER — HOSPITAL ENCOUNTER (OUTPATIENT)
Dept: RADIOLOGY | Facility: MEDICAL CENTER | Age: 32
End: 2022-07-29
Attending: FAMILY MEDICINE
Payer: COMMERCIAL

## 2022-07-29 DIAGNOSIS — R79.89 ABNORMAL SERUM THYROID STIMULATING HORMONE (TSH) LEVEL: ICD-10-CM

## 2022-07-29 DIAGNOSIS — E05.90 SUBCLINICAL HYPERTHYROIDISM: ICD-10-CM

## 2022-07-29 PROCEDURE — 76536 US EXAM OF HEAD AND NECK: CPT

## 2022-08-11 ENCOUNTER — EH NON-PROVIDER (OUTPATIENT)
Dept: OCCUPATIONAL MEDICINE | Facility: CLINIC | Age: 32
End: 2022-08-11

## 2022-08-11 ENCOUNTER — HOSPITAL ENCOUNTER (OUTPATIENT)
Facility: MEDICAL CENTER | Age: 32
End: 2022-08-11
Attending: NURSE PRACTITIONER
Payer: COMMERCIAL

## 2022-08-11 DIAGNOSIS — Z02.89 VISIT FOR OCCUPATIONAL HEALTH EXAMINATION: Primary | ICD-10-CM

## 2022-08-11 DIAGNOSIS — Z02.89 VISIT FOR OCCUPATIONAL HEALTH EXAMINATION: ICD-10-CM

## 2022-08-11 LAB
AMP AMPHETAMINE: NORMAL
BAR BARBITURATES: NORMAL
BZO BENZODIAZEPINES: NORMAL
COC COCAINE: NORMAL
INT CON NEG: NORMAL
INT CON POS: NORMAL
MDMA ECSTASY: NORMAL
MET METHAMPHETAMINES: NORMAL
MTD METHADONE: NORMAL
OPI OPIATES: NORMAL
OXY OXYCODONE: NORMAL
PCP PHENCYCLIDINE: NORMAL
POC URINE DRUG SCREEN OCDRS: NORMAL
THC: NORMAL

## 2022-08-11 PROCEDURE — 86787 VARICELLA-ZOSTER ANTIBODY: CPT | Performed by: NURSE PRACTITIONER

## 2022-08-11 PROCEDURE — 86480 TB TEST CELL IMMUN MEASURE: CPT | Performed by: NURSE PRACTITIONER

## 2022-08-11 PROCEDURE — 94375 RESPIRATORY FLOW VOLUME LOOP: CPT | Performed by: NURSE PRACTITIONER

## 2022-08-11 PROCEDURE — 80305 DRUG TEST PRSMV DIR OPT OBS: CPT | Performed by: NURSE PRACTITIONER

## 2022-08-12 ENCOUNTER — EH NON-PROVIDER (OUTPATIENT)
Dept: OCCUPATIONAL MEDICINE | Facility: CLINIC | Age: 32
End: 2022-08-12

## 2022-08-12 LAB
GAMMA INTERFERON BACKGROUND BLD IA-ACNC: 0.02 IU/ML
M TB IFN-G BLD-IMP: NEGATIVE
M TB IFN-G CD4+ BCKGRND COR BLD-ACNC: 0 IU/ML
MITOGEN IGNF BCKGRD COR BLD-ACNC: >10 IU/ML
QFT TB2 - NIL TBQ2: 0.01 IU/ML

## 2022-08-12 PROCEDURE — 94375 RESPIRATORY FLOW VOLUME LOOP: CPT | Performed by: NURSE PRACTITIONER

## 2022-08-13 LAB — VZV IGG SER IA-ACNC: 2336 IV

## 2022-09-16 ENCOUNTER — EH NON-PROVIDER (OUTPATIENT)
Dept: OCCUPATIONAL MEDICINE | Facility: CLINIC | Age: 32
End: 2022-09-16

## 2022-09-29 ENCOUNTER — IMMUNIZATION (OUTPATIENT)
Dept: OCCUPATIONAL MEDICINE | Facility: CLINIC | Age: 32
End: 2022-09-29

## 2022-09-29 DIAGNOSIS — Z23 NEED FOR VACCINATION: Primary | ICD-10-CM

## 2022-09-29 PROCEDURE — 90686 IIV4 VACC NO PRSV 0.5 ML IM: CPT | Performed by: PREVENTIVE MEDICINE

## 2023-03-08 ENCOUNTER — RX ONLY (OUTPATIENT)
Age: 33
Setting detail: RX ONLY
End: 2023-03-08

## 2023-03-08 RX ORDER — AMOXICILLIN 500 MG/1
1 TAB TABLET, FILM COATED ORAL TID
Qty: 21 | Refills: 0 | Status: ERX | COMMUNITY
Start: 2023-03-08

## 2024-04-13 ENCOUNTER — HOSPITAL ENCOUNTER (OUTPATIENT)
Dept: RADIOLOGY | Facility: MEDICAL CENTER | Age: 34
End: 2024-04-13
Attending: INTERNAL MEDICINE
Payer: COMMERCIAL

## 2024-04-13 DIAGNOSIS — R11.2 NAUSEA AND VOMITING, UNSPECIFIED VOMITING TYPE: ICD-10-CM

## 2024-04-13 DIAGNOSIS — R63.4 LOSS OF WEIGHT: ICD-10-CM

## 2024-04-13 DIAGNOSIS — R19.4 FREQUENT BOWEL MOVEMENTS: ICD-10-CM

## 2024-04-13 DIAGNOSIS — R10.84 ABDOMINAL PAIN, GENERALIZED: ICD-10-CM

## 2024-04-13 DIAGNOSIS — K92.1 HEMATOCHEZIA: ICD-10-CM

## 2024-04-13 PROCEDURE — 700117 HCHG RX CONTRAST REV CODE 255: Performed by: INTERNAL MEDICINE

## 2024-04-13 PROCEDURE — 74177 CT ABD & PELVIS W/CONTRAST: CPT

## 2024-04-13 RX ADMIN — IOHEXOL 25 ML: 240 INJECTION, SOLUTION INTRATHECAL; INTRAVASCULAR; INTRAVENOUS; ORAL at 11:47

## 2024-04-13 RX ADMIN — IOHEXOL 100 ML: 350 INJECTION, SOLUTION INTRAVENOUS at 11:47

## 2024-06-06 ENCOUNTER — RX ONLY (OUTPATIENT)
Age: 34
Setting detail: RX ONLY
End: 2024-06-06

## 2024-06-06 RX ORDER — AMOXICILLIN 500 MG/1
CAPSULE ORAL
Qty: 30 | Refills: 0 | Status: ERX